# Patient Record
Sex: MALE | Race: WHITE | Employment: FULL TIME | URBAN - METROPOLITAN AREA
[De-identification: names, ages, dates, MRNs, and addresses within clinical notes are randomized per-mention and may not be internally consistent; named-entity substitution may affect disease eponyms.]

---

## 2018-11-30 ENCOUNTER — APPOINTMENT (OUTPATIENT)
Dept: CT IMAGING | Age: 48
End: 2018-11-30
Payer: COMMERCIAL

## 2018-11-30 ENCOUNTER — HOSPITAL ENCOUNTER (EMERGENCY)
Age: 48
Discharge: HOME | End: 2018-11-30
Attending: EMERGENCY MEDICINE
Payer: COMMERCIAL

## 2018-11-30 VITALS
DIASTOLIC BLOOD PRESSURE: 96 MMHG | TEMPERATURE: 98.3 F | HEIGHT: 75 IN | HEART RATE: 92 BPM | OXYGEN SATURATION: 96 % | WEIGHT: 235 LBS | SYSTOLIC BLOOD PRESSURE: 154 MMHG | BODY MASS INDEX: 29.22 KG/M2 | RESPIRATION RATE: 18 BRPM

## 2018-11-30 DIAGNOSIS — K43.9 ABDOMINAL WALL HERNIA: Primary | ICD-10-CM

## 2018-11-30 LAB
ALBUMIN SERPL-MCNC: 4.2 G/DL (ref 3.5–5.2)
ALP BLD-CCNC: 52 U/L (ref 40–129)
ALT SERPL-CCNC: 24 U/L (ref 0–40)
ANION GAP SERPL CALCULATED.3IONS-SCNC: 11 MMOL/L (ref 7–16)
AST SERPL-CCNC: 20 U/L (ref 0–39)
BASOPHILS ABSOLUTE: 0.02 E9/L (ref 0–0.2)
BASOPHILS RELATIVE PERCENT: 0.3 % (ref 0–2)
BILIRUB SERPL-MCNC: 0.6 MG/DL (ref 0–1.2)
BILIRUBIN URINE: NEGATIVE
BLOOD, URINE: NEGATIVE
BUN BLDV-MCNC: 16 MG/DL (ref 6–20)
CALCIUM SERPL-MCNC: 9.1 MG/DL (ref 8.6–10.2)
CHLORIDE BLD-SCNC: 105 MMOL/L (ref 98–107)
CLARITY: CLEAR
CO2: 23 MMOL/L (ref 22–29)
COLOR: YELLOW
CREAT SERPL-MCNC: 1.1 MG/DL (ref 0.7–1.2)
EOSINOPHILS ABSOLUTE: 0.07 E9/L (ref 0.05–0.5)
EOSINOPHILS RELATIVE PERCENT: 1 % (ref 0–6)
GFR AFRICAN AMERICAN: >60
GFR NON-AFRICAN AMERICAN: >60 ML/MIN/1.73
GLUCOSE BLD-MCNC: 119 MG/DL (ref 74–99)
GLUCOSE URINE: NEGATIVE MG/DL
HCT VFR BLD CALC: 40.2 % (ref 37–54)
HEMOGLOBIN: 13.4 G/DL (ref 12.5–16.5)
IMMATURE GRANULOCYTES #: 0.02 E9/L
IMMATURE GRANULOCYTES %: 0.3 % (ref 0–5)
KETONES, URINE: NEGATIVE MG/DL
LEUKOCYTE ESTERASE, URINE: NEGATIVE
LYMPHOCYTES ABSOLUTE: 1.3 E9/L (ref 1.5–4)
LYMPHOCYTES RELATIVE PERCENT: 18 % (ref 20–42)
MCH RBC QN AUTO: 26.4 PG (ref 26–35)
MCHC RBC AUTO-ENTMCNC: 33.3 % (ref 32–34.5)
MCV RBC AUTO: 79.1 FL (ref 80–99.9)
MONOCYTES ABSOLUTE: 0.81 E9/L (ref 0.1–0.95)
MONOCYTES RELATIVE PERCENT: 11.2 % (ref 2–12)
NEUTROPHILS ABSOLUTE: 5.01 E9/L (ref 1.8–7.3)
NEUTROPHILS RELATIVE PERCENT: 69.2 % (ref 43–80)
NITRITE, URINE: NEGATIVE
PDW BLD-RTO: 13.8 FL (ref 11.5–15)
PH UA: 5.5 (ref 5–9)
PLATELET # BLD: 135 E9/L (ref 130–450)
PMV BLD AUTO: 10.3 FL (ref 7–12)
POTASSIUM SERPL-SCNC: 4 MMOL/L (ref 3.5–5)
PROTEIN UA: NEGATIVE MG/DL
RBC # BLD: 5.08 E12/L (ref 3.8–5.8)
SODIUM BLD-SCNC: 139 MMOL/L (ref 132–146)
SPECIFIC GRAVITY UA: <=1.005 (ref 1–1.03)
TOTAL PROTEIN: 7.1 G/DL (ref 6.4–8.3)
UROBILINOGEN, URINE: 0.2 E.U./DL
WBC # BLD: 7.2 E9/L (ref 4.5–11.5)

## 2018-11-30 PROCEDURE — 2580000003 HC RX 258: Performed by: RADIOLOGY

## 2018-11-30 PROCEDURE — 74177 CT ABD & PELVIS W/CONTRAST: CPT

## 2018-11-30 PROCEDURE — 85025 COMPLETE CBC W/AUTO DIFF WBC: CPT

## 2018-11-30 PROCEDURE — 99284 EMERGENCY DEPT VISIT MOD MDM: CPT

## 2018-11-30 PROCEDURE — 96374 THER/PROPH/DIAG INJ IV PUSH: CPT

## 2018-11-30 PROCEDURE — 81003 URINALYSIS AUTO W/O SCOPE: CPT

## 2018-11-30 PROCEDURE — 36415 COLL VENOUS BLD VENIPUNCTURE: CPT

## 2018-11-30 PROCEDURE — 2580000003 HC RX 258: Performed by: EMERGENCY MEDICINE

## 2018-11-30 PROCEDURE — 80053 COMPREHEN METABOLIC PANEL: CPT

## 2018-11-30 PROCEDURE — 6360000002 HC RX W HCPCS: Performed by: EMERGENCY MEDICINE

## 2018-11-30 PROCEDURE — 6360000004 HC RX CONTRAST MEDICATION: Performed by: RADIOLOGY

## 2018-11-30 RX ORDER — SODIUM CHLORIDE 0.9 % (FLUSH) 0.9 %
10 SYRINGE (ML) INJECTION ONCE
Status: COMPLETED | OUTPATIENT
Start: 2018-11-30 | End: 2018-11-30

## 2018-11-30 RX ORDER — 0.9 % SODIUM CHLORIDE 0.9 %
1000 INTRAVENOUS SOLUTION INTRAVENOUS ONCE
Status: COMPLETED | OUTPATIENT
Start: 2018-11-30 | End: 2018-11-30

## 2018-11-30 RX ORDER — KETOROLAC TROMETHAMINE 30 MG/ML
30 INJECTION, SOLUTION INTRAMUSCULAR; INTRAVENOUS ONCE
Status: COMPLETED | OUTPATIENT
Start: 2018-11-30 | End: 2018-11-30

## 2018-11-30 RX ADMIN — KETOROLAC TROMETHAMINE 30 MG: 30 INJECTION, SOLUTION INTRAMUSCULAR at 02:59

## 2018-11-30 RX ADMIN — IOPAMIDOL 100 ML: 755 INJECTION, SOLUTION INTRAVENOUS at 03:31

## 2018-11-30 RX ADMIN — Medication 10 ML: at 03:31

## 2018-11-30 RX ADMIN — SODIUM CHLORIDE 1000 ML: 9 INJECTION, SOLUTION INTRAVENOUS at 02:58

## 2018-11-30 ASSESSMENT — PAIN DESCRIPTION - DESCRIPTORS: DESCRIPTORS: STABBING

## 2018-11-30 ASSESSMENT — PAIN SCALES - GENERAL
PAINLEVEL_OUTOF10: 8
PAINLEVEL_OUTOF10: 4
PAINLEVEL_OUTOF10: 8

## 2018-11-30 ASSESSMENT — PAIN DESCRIPTION - FREQUENCY: FREQUENCY: CONTINUOUS

## 2018-11-30 ASSESSMENT — PAIN DESCRIPTION - LOCATION: LOCATION: ABDOMEN

## 2018-11-30 ASSESSMENT — PAIN DESCRIPTION - PAIN TYPE: TYPE: ACUTE PAIN

## 2018-11-30 ASSESSMENT — PAIN DESCRIPTION - ORIENTATION: ORIENTATION: LEFT

## 2018-11-30 NOTE — ED NOTES
IV removed. Dressing applied. Discharge instructions given. Patient states verbal understanding. Ambulatory to exit.        Alejandra Rosario RN  11/30/18 3584

## 2018-11-30 NOTE — ED NOTES
IV initiated. Labs obtained. Medicated as ordered. Patient tolerated well.       Kenneth Vargas RN  11/30/18 7688

## 2018-11-30 NOTE — ED PROVIDER NOTES
Department of Emergency Medicine   ED  Provider Note  Admit Date/Time: 11/30/2018  2:30 AM  ED Bed: 05/05  Chief Complaint     Abdominal Pain (left sided abdominal pain since this evening, denies nausea or vomiting)    History of Present Illness   Source of history provided by:  patient. History/Exam Limitations: none. Woody Willams is a 46 y.o. old male who has a past medical history of:   Past Medical History:   Diagnosis Date    DVT (deep vein thrombosis) in pregnancy ([facility] [address]     Lupus anticoagulant disorder ([facility] [address]     presents to the emergency department by private vehicle, ambulatory and accompanied by his son, for complaints of gradual onset, still present, aching pain L side of his abd without radiation which began several hour(s) prior to arrival.  It started while @ work yesterday but he had to wait 'til his son came home to bring him here. There has been no similar episodes in the past he does have a lge abd that's been repaired x2, but he states this pain is different. Since onset the symptoms have been persistent. But now also on the R side. The pain is associated with nothing pertinent. The pain is aggravated by none and relieved by nothing. He did try some Pepto w/o relief. There has been NO vomiting. Jacki Norse ROS   Pertinent positives and negatives are stated within HPI, all other systems reviewed and are negative. Past Surgical History:   Procedure Laterality Date    CHOLECYSTECTOMY      HERNIA REPAIR      x 2   Social History:  reports that he has never smoked. He does not have any smokeless tobacco history on file. Family History: family history is not on file. Allergies: Patient has no known allergies.     Physical Exam           ED Triage Vitals [11/30/18 0233]   BP Temp Temp Source Pulse Resp SpO2 Height Weight   (!) 154/96 98.3 °F (36.8 °C) Oral 92 18 96 % 6' 3\" (1.905 m) 235 lb (106.6 kg)      Oxygen Saturation Interpretation: Normal.    · General Appearance/Constitutional: - 29 mmol/L    Anion Gap 11 7 - 16 mmol/L    Glucose 119 (H) 74 - 99 mg/dL    BUN 16 6 - 20 mg/dL    CREATININE 1.1 0.7 - 1.2 mg/dL    GFR Non-African American >60 >=60 mL/min/1.73    GFR African American >60     Calcium 9.1 8.6 - 10.2 mg/dL    Total Protein 7.1 6.4 - 8.3 g/dL    Alb 4.2 3.5 - 5.2 g/dL    Total Bilirubin 0.6 0.0 - 1.2 mg/dL    Alkaline Phosphatase 52 40 - 129 U/L    ALT 24 0 - 40 U/L    AST 20 0 - 39 U/L   Urinalysis   Result Value Ref Range    Color, UA Yellow Straw/Yellow    Clarity, UA Clear Clear    Glucose, Ur Negative Negative mg/dL    Bilirubin Urine Negative Negative    Ketones, Urine Negative Negative mg/dL    Specific Gravity, UA <=1.005 1.005 - 1.030    Blood, Urine Negative Negative    pH, UA 5.5 5.0 - 9.0    Protein, UA Negative Negative mg/dL    Urobilinogen, Urine 0.2 <2.0 E.U./dL    Nitrite, Urine Negative Negative    Leukocyte Esterase, Urine Negative Negative     Imaging: All Radiology results interpreted by Radiologist unless otherwise noted. CT ABDOMEN PELVIS W IV CONTRAST Additional Contrast? None   Final Result   1. There is a umbilical hernia containing loops of small bowel. There there are    nondilated fluid-filled loops of small bowel seen proximally and attenuated    small bowel extending to the hernia sac possibly causing mild obstruction. 2. Normal-appearing appendix    3. No evidence for ureteral obstruction    4. Bilateral simple appearing renal cysts, largest present on the left    measuring 2.7 cm.        This report has been electronically signed by Marie Parker MD.          ED Course / Medical Decision Making     Medications   0.9 % sodium chloride bolus (1,000 mLs Intravenous New Bag 11/30/18 0258)   ketorolac (TORADOL) injection 30 mg (30 mg Intravenous Given 11/30/18 0259)   iopamidol (ISOVUE-370) 76 % injection 100 mL (100 mLs Intravenous Given 11/30/18 0331)   sodium chloride flush 0.9 % injection 10 mL (10 mLs Intravenous Given 11/30/18 0331)

## 2021-10-09 ENCOUNTER — HOSPITAL ENCOUNTER (INPATIENT)
Age: 51
LOS: 2 days | Discharge: HOME | DRG: 640 | End: 2021-10-11
Attending: EMERGENCY MEDICINE | Admitting: INTERNAL MEDICINE
Payer: COMMERCIAL

## 2021-10-09 ENCOUNTER — APPOINTMENT (OUTPATIENT)
Dept: GENERAL RADIOLOGY | Age: 51
DRG: 640 | End: 2021-10-09
Payer: COMMERCIAL

## 2021-10-09 ENCOUNTER — APPOINTMENT (OUTPATIENT)
Dept: CT IMAGING | Age: 51
DRG: 640 | End: 2021-10-09
Payer: COMMERCIAL

## 2021-10-09 DIAGNOSIS — J96.01 ACUTE RESPIRATORY FAILURE WITH HYPOXIA (HCC): Primary | ICD-10-CM

## 2021-10-09 DIAGNOSIS — J18.9 PNEUMONIA DUE TO INFECTIOUS ORGANISM, UNSPECIFIED LATERALITY, UNSPECIFIED PART OF LUNG: ICD-10-CM

## 2021-10-09 PROBLEM — R79.89 ELEVATED TROPONIN: Status: ACTIVE | Noted: 2021-10-09

## 2021-10-09 PROBLEM — Z95.2 HX OF AORTIC VALVE REPLACEMENT: Status: ACTIVE | Noted: 2021-10-09

## 2021-10-09 PROBLEM — Z86.718 HISTORY OF DVT (DEEP VEIN THROMBOSIS): Status: ACTIVE | Noted: 2021-10-09

## 2021-10-09 PROBLEM — R77.8 ELEVATED TROPONIN: Status: ACTIVE | Noted: 2021-10-09

## 2021-10-09 PROBLEM — E87.1 HYPONATREMIA: Status: ACTIVE | Noted: 2021-10-09

## 2021-10-09 LAB
ADENOVIRUS BY PCR: NOT DETECTED
ALBUMIN SERPL-MCNC: 3.6 G/DL (ref 3.5–5.2)
ALP BLD-CCNC: 56 U/L (ref 40–129)
ALT SERPL-CCNC: 19 U/L (ref 0–40)
ANION GAP SERPL CALCULATED.3IONS-SCNC: 10 MMOL/L (ref 7–16)
ANION GAP SERPL CALCULATED.3IONS-SCNC: 13 MMOL/L (ref 7–16)
ANION GAP SERPL CALCULATED.3IONS-SCNC: 9 MMOL/L (ref 7–16)
ANISOCYTOSIS: ABNORMAL
APTT: 74.9 SEC (ref 24.5–35.1)
AST SERPL-CCNC: 28 U/L (ref 0–39)
BASOPHILS ABSOLUTE: 0 E9/L (ref 0–0.2)
BASOPHILS ABSOLUTE: 0.02 E9/L (ref 0–0.2)
BASOPHILS RELATIVE PERCENT: 0 % (ref 0–2)
BASOPHILS RELATIVE PERCENT: 0.5 % (ref 0–2)
BILIRUB SERPL-MCNC: 0.3 MG/DL (ref 0–1.2)
BORDETELLA PARAPERTUSSIS BY PCR: NOT DETECTED
BORDETELLA PERTUSSIS BY PCR: NOT DETECTED
BUN BLDV-MCNC: 13 MG/DL (ref 6–20)
BUN BLDV-MCNC: 13 MG/DL (ref 6–20)
BUN BLDV-MCNC: 15 MG/DL (ref 6–20)
BURR CELLS: ABNORMAL
C-REACTIVE PROTEIN: 10.4 MG/DL (ref 0–0.4)
C-REACTIVE PROTEIN: 11.8 MG/DL (ref 0–0.4)
CALCIUM SERPL-MCNC: 8.5 MG/DL (ref 8.6–10.2)
CALCIUM SERPL-MCNC: 8.8 MG/DL (ref 8.6–10.2)
CALCIUM SERPL-MCNC: 8.9 MG/DL (ref 8.6–10.2)
CHLAMYDOPHILIA PNEUMONIAE BY PCR: NOT DETECTED
CHLORIDE BLD-SCNC: 104 MMOL/L (ref 98–107)
CHLORIDE BLD-SCNC: 107 MMOL/L (ref 98–107)
CHLORIDE BLD-SCNC: 98 MMOL/L (ref 98–107)
CO2: 17 MMOL/L (ref 22–29)
CO2: 19 MMOL/L (ref 22–29)
CO2: 22 MMOL/L (ref 22–29)
CORONAVIRUS 229E BY PCR: NOT DETECTED
CORONAVIRUS HKU1 BY PCR: NOT DETECTED
CORONAVIRUS NL63 BY PCR: NOT DETECTED
CORONAVIRUS OC43 BY PCR: NOT DETECTED
CREAT SERPL-MCNC: 1.1 MG/DL (ref 0.7–1.2)
CREAT SERPL-MCNC: 1.2 MG/DL (ref 0.7–1.2)
CREAT SERPL-MCNC: 1.2 MG/DL (ref 0.7–1.2)
D DIMER: 621 NG/ML DDU
EKG ATRIAL RATE: 92 BPM
EKG P AXIS: 57 DEGREES
EKG P-R INTERVAL: 150 MS
EKG Q-T INTERVAL: 376 MS
EKG QRS DURATION: 100 MS
EKG QTC CALCULATION (BAZETT): 464 MS
EKG R AXIS: 24 DEGREES
EKG T AXIS: 41 DEGREES
EKG VENTRICULAR RATE: 92 BPM
EOSINOPHILS ABSOLUTE: 0.02 E9/L (ref 0.05–0.5)
EOSINOPHILS ABSOLUTE: 0.06 E9/L (ref 0.05–0.5)
EOSINOPHILS RELATIVE PERCENT: 0.6 % (ref 0–6)
EOSINOPHILS RELATIVE PERCENT: 1.6 % (ref 0–6)
FERRITIN: 110 NG/ML
FIBRINOGEN: 695 MG/DL (ref 225–540)
GFR AFRICAN AMERICAN: >60
GFR NON-AFRICAN AMERICAN: >60 ML/MIN/1.73
GLUCOSE BLD-MCNC: 123 MG/DL (ref 74–99)
GLUCOSE BLD-MCNC: 126 MG/DL (ref 74–99)
GLUCOSE BLD-MCNC: 165 MG/DL (ref 74–99)
HCT VFR BLD CALC: 34.4 % (ref 37–54)
HCT VFR BLD CALC: 35 % (ref 37–54)
HEMOGLOBIN: 11.5 G/DL (ref 12.5–16.5)
HEMOGLOBIN: 11.5 G/DL (ref 12.5–16.5)
HUMAN METAPNEUMOVIRUS BY PCR: NOT DETECTED
HUMAN RHINOVIRUS/ENTEROVIRUS BY PCR: NOT DETECTED
HYPOCHROMIA: ABNORMAL
IMMATURE GRANULOCYTES #: 0.01 E9/L
IMMATURE GRANULOCYTES #: 0.02 E9/L
IMMATURE GRANULOCYTES %: 0.3 % (ref 0–5)
IMMATURE GRANULOCYTES %: 0.5 % (ref 0–5)
INFLUENZA A BY PCR: NOT DETECTED
INFLUENZA B BY PCR: NOT DETECTED
INR BLD: 2.5
LACTATE DEHYDROGENASE: 342 U/L (ref 135–225)
LYMPHOCYTES ABSOLUTE: 0.47 E9/L (ref 1.5–4)
LYMPHOCYTES ABSOLUTE: 0.63 E9/L (ref 1.5–4)
LYMPHOCYTES RELATIVE PERCENT: 13 % (ref 20–42)
LYMPHOCYTES RELATIVE PERCENT: 16.3 % (ref 20–42)
MCH RBC QN AUTO: 23.5 PG (ref 26–35)
MCH RBC QN AUTO: 23.9 PG (ref 26–35)
MCHC RBC AUTO-ENTMCNC: 32.9 % (ref 32–34.5)
MCHC RBC AUTO-ENTMCNC: 33.4 % (ref 32–34.5)
MCV RBC AUTO: 71.4 FL (ref 80–99.9)
MCV RBC AUTO: 71.6 FL (ref 80–99.9)
MONOCYTES ABSOLUTE: 0.19 E9/L (ref 0.1–0.95)
MONOCYTES ABSOLUTE: 0.24 E9/L (ref 0.1–0.95)
MONOCYTES RELATIVE PERCENT: 5.2 % (ref 2–12)
MONOCYTES RELATIVE PERCENT: 6.2 % (ref 2–12)
MYCOPLASMA PNEUMONIAE BY PCR: NOT DETECTED
NEUTROPHILS ABSOLUTE: 2.9 E9/L (ref 1.8–7.3)
NEUTROPHILS ABSOLUTE: 2.93 E9/L (ref 1.8–7.3)
NEUTROPHILS RELATIVE PERCENT: 74.9 % (ref 43–80)
NEUTROPHILS RELATIVE PERCENT: 80.9 % (ref 43–80)
OVALOCYTES: ABNORMAL
PARAINFLUENZA VIRUS 1 BY PCR: NOT DETECTED
PARAINFLUENZA VIRUS 2 BY PCR: NOT DETECTED
PARAINFLUENZA VIRUS 3 BY PCR: NOT DETECTED
PARAINFLUENZA VIRUS 4 BY PCR: NOT DETECTED
PDW BLD-RTO: 16.3 FL (ref 11.5–15)
PDW BLD-RTO: 16.5 FL (ref 11.5–15)
PLATELET # BLD: 191 E9/L (ref 130–450)
PLATELET # BLD: 203 E9/L (ref 130–450)
PMV BLD AUTO: 10.2 FL (ref 7–12)
PMV BLD AUTO: 10.2 FL (ref 7–12)
POIKILOCYTES: ABNORMAL
POLYCHROMASIA: ABNORMAL
POTASSIUM REFLEX MAGNESIUM: 4.2 MMOL/L (ref 3.5–5)
POTASSIUM SERPL-SCNC: 3.9 MMOL/L (ref 3.5–5)
POTASSIUM SERPL-SCNC: 4.1 MMOL/L (ref 3.5–5)
PRO-BNP: 204 PG/ML (ref 0–125)
PROCALCITONIN: 0.18 NG/ML (ref 0–0.08)
PROTHROMBIN TIME: 28.4 SEC (ref 9.3–12.4)
RBC # BLD: 4.82 E12/L (ref 3.8–5.8)
RBC # BLD: 4.89 E12/L (ref 3.8–5.8)
RESPIRATORY SYNCYTIAL VIRUS BY PCR: NOT DETECTED
SARS-COV-2, NAAT: NOT DETECTED
SARS-COV-2, NAAT: NOT DETECTED
SARS-COV-2, PCR: NOT DETECTED
SCHISTOCYTES: ABNORMAL
SEDIMENTATION RATE, ERYTHROCYTE: 44 MM/HR (ref 0–15)
SODIUM BLD-SCNC: 128 MMOL/L (ref 132–146)
SODIUM BLD-SCNC: 135 MMOL/L (ref 132–146)
SODIUM BLD-SCNC: 136 MMOL/L (ref 132–146)
TOTAL PROTEIN: 6.9 G/DL (ref 6.4–8.3)
TROPONIN, HIGH SENSITIVITY: 12 NG/L (ref 0–11)
TROPONIN, HIGH SENSITIVITY: 24 NG/L (ref 0–11)
TROPONIN, HIGH SENSITIVITY: 35 NG/L (ref 0–11)
WBC # BLD: 3.6 E9/L (ref 4.5–11.5)
WBC # BLD: 3.9 E9/L (ref 4.5–11.5)

## 2021-10-09 PROCEDURE — 83615 LACTATE (LD) (LDH) ENZYME: CPT

## 2021-10-09 PROCEDURE — 2060000000 HC ICU INTERMEDIATE R&B

## 2021-10-09 PROCEDURE — 86140 C-REACTIVE PROTEIN: CPT

## 2021-10-09 PROCEDURE — 84484 ASSAY OF TROPONIN QUANT: CPT

## 2021-10-09 PROCEDURE — 93005 ELECTROCARDIOGRAM TRACING: CPT | Performed by: EMERGENCY MEDICINE

## 2021-10-09 PROCEDURE — 85384 FIBRINOGEN ACTIVITY: CPT

## 2021-10-09 PROCEDURE — 36415 COLL VENOUS BLD VENIPUNCTURE: CPT

## 2021-10-09 PROCEDURE — 2580000003 HC RX 258: Performed by: EMERGENCY MEDICINE

## 2021-10-09 PROCEDURE — 85378 FIBRIN DEGRADE SEMIQUANT: CPT

## 2021-10-09 PROCEDURE — 82728 ASSAY OF FERRITIN: CPT

## 2021-10-09 PROCEDURE — 2500000003 HC RX 250 WO HCPCS: Performed by: NURSE PRACTITIONER

## 2021-10-09 PROCEDURE — 85610 PROTHROMBIN TIME: CPT

## 2021-10-09 PROCEDURE — 85651 RBC SED RATE NONAUTOMATED: CPT

## 2021-10-09 PROCEDURE — 70450 CT HEAD/BRAIN W/O DYE: CPT

## 2021-10-09 PROCEDURE — 6360000002 HC RX W HCPCS: Performed by: EMERGENCY MEDICINE

## 2021-10-09 PROCEDURE — 71275 CT ANGIOGRAPHY CHEST: CPT

## 2021-10-09 PROCEDURE — 71045 X-RAY EXAM CHEST 1 VIEW: CPT

## 2021-10-09 PROCEDURE — 6370000000 HC RX 637 (ALT 250 FOR IP): Performed by: NURSE PRACTITIONER

## 2021-10-09 PROCEDURE — 83880 ASSAY OF NATRIURETIC PEPTIDE: CPT

## 2021-10-09 PROCEDURE — 2500000003 HC RX 250 WO HCPCS: Performed by: EMERGENCY MEDICINE

## 2021-10-09 PROCEDURE — 93010 ELECTROCARDIOGRAM REPORT: CPT | Performed by: INTERNAL MEDICINE

## 2021-10-09 PROCEDURE — 0202U NFCT DS 22 TRGT SARS-COV-2: CPT

## 2021-10-09 PROCEDURE — 85025 COMPLETE CBC W/AUTO DIFF WBC: CPT

## 2021-10-09 PROCEDURE — 87449 NOS EACH ORGANISM AG IA: CPT

## 2021-10-09 PROCEDURE — 6360000004 HC RX CONTRAST MEDICATION: Performed by: RADIOLOGY

## 2021-10-09 PROCEDURE — 87040 BLOOD CULTURE FOR BACTERIA: CPT

## 2021-10-09 PROCEDURE — 99284 EMERGENCY DEPT VISIT MOD MDM: CPT

## 2021-10-09 PROCEDURE — 85730 THROMBOPLASTIN TIME PARTIAL: CPT

## 2021-10-09 PROCEDURE — 87635 SARS-COV-2 COVID-19 AMP PRB: CPT

## 2021-10-09 PROCEDURE — 84145 PROCALCITONIN (PCT): CPT

## 2021-10-09 PROCEDURE — 2700000000 HC OXYGEN THERAPY PER DAY

## 2021-10-09 PROCEDURE — 80048 BASIC METABOLIC PNL TOTAL CA: CPT

## 2021-10-09 PROCEDURE — 80053 COMPREHEN METABOLIC PANEL: CPT

## 2021-10-09 PROCEDURE — 2580000003 HC RX 258: Performed by: NURSE PRACTITIONER

## 2021-10-09 RX ORDER — ALBUTEROL SULFATE 90 UG/1
2 AEROSOL, METERED RESPIRATORY (INHALATION) EVERY 6 HOURS
Status: DISCONTINUED | OUTPATIENT
Start: 2021-10-09 | End: 2021-10-09

## 2021-10-09 RX ORDER — DILTIAZEM HYDROCHLORIDE 120 MG/1
120 CAPSULE, COATED, EXTENDED RELEASE ORAL DAILY
COMMUNITY

## 2021-10-09 RX ORDER — IPRATROPIUM BROMIDE AND ALBUTEROL SULFATE 2.5; .5 MG/3ML; MG/3ML
1 SOLUTION RESPIRATORY (INHALATION)
Status: DISCONTINUED | OUTPATIENT
Start: 2021-10-09 | End: 2021-10-09

## 2021-10-09 RX ORDER — FLUTICASONE PROPIONATE 50 MCG
2 SPRAY, SUSPENSION (ML) NASAL DAILY
Status: DISCONTINUED | OUTPATIENT
Start: 2021-10-09 | End: 2021-10-11 | Stop reason: HOSPADM

## 2021-10-09 RX ORDER — SODIUM CHLORIDE 9 MG/ML
INJECTION, SOLUTION INTRAVENOUS CONTINUOUS
Status: DISCONTINUED | OUTPATIENT
Start: 2021-10-09 | End: 2021-10-09

## 2021-10-09 RX ORDER — METOPROLOL SUCCINATE 50 MG/1
50 TABLET, EXTENDED RELEASE ORAL DAILY
Status: DISCONTINUED | OUTPATIENT
Start: 2021-10-09 | End: 2021-10-11 | Stop reason: HOSPADM

## 2021-10-09 RX ORDER — DILTIAZEM HYDROCHLORIDE 120 MG/1
120 CAPSULE, COATED, EXTENDED RELEASE ORAL DAILY
Status: DISCONTINUED | OUTPATIENT
Start: 2021-10-09 | End: 2021-10-11 | Stop reason: HOSPADM

## 2021-10-09 RX ORDER — DEXAMETHASONE SODIUM PHOSPHATE 4 MG/ML
6 INJECTION, SOLUTION INTRA-ARTICULAR; INTRALESIONAL; INTRAMUSCULAR; INTRAVENOUS; SOFT TISSUE DAILY
Status: DISCONTINUED | OUTPATIENT
Start: 2021-10-10 | End: 2021-10-09

## 2021-10-09 RX ORDER — DEXAMETHASONE SODIUM PHOSPHATE 10 MG/ML
6 INJECTION INTRAMUSCULAR; INTRAVENOUS ONCE
Status: COMPLETED | OUTPATIENT
Start: 2021-10-09 | End: 2021-10-09

## 2021-10-09 RX ORDER — WARFARIN SODIUM 5 MG/1
7.5 TABLET ORAL
Status: COMPLETED | OUTPATIENT
Start: 2021-10-09 | End: 2021-10-09

## 2021-10-09 RX ORDER — POLYETHYLENE GLYCOL 3350 17 G/17G
17 POWDER, FOR SOLUTION ORAL DAILY PRN
Status: DISCONTINUED | OUTPATIENT
Start: 2021-10-09 | End: 2021-10-11 | Stop reason: HOSPADM

## 2021-10-09 RX ORDER — ACETAMINOPHEN 650 MG/1
650 SUPPOSITORY RECTAL EVERY 6 HOURS PRN
Status: DISCONTINUED | OUTPATIENT
Start: 2021-10-09 | End: 2021-10-11 | Stop reason: HOSPADM

## 2021-10-09 RX ORDER — PROCHLORPERAZINE EDISYLATE 5 MG/ML
5 INJECTION INTRAMUSCULAR; INTRAVENOUS EVERY 6 HOURS PRN
Status: DISCONTINUED | OUTPATIENT
Start: 2021-10-09 | End: 2021-10-11 | Stop reason: HOSPADM

## 2021-10-09 RX ORDER — IPRATROPIUM BROMIDE AND ALBUTEROL SULFATE 2.5; .5 MG/3ML; MG/3ML
1 SOLUTION RESPIRATORY (INHALATION)
Status: CANCELLED | OUTPATIENT
Start: 2021-10-09

## 2021-10-09 RX ORDER — METOPROLOL SUCCINATE 25 MG/1
50 TABLET, EXTENDED RELEASE ORAL DAILY
COMMUNITY
End: 2022-02-07

## 2021-10-09 RX ORDER — SODIUM CHLORIDE 0.9 % (FLUSH) 0.9 %
5-40 SYRINGE (ML) INJECTION PRN
Status: DISCONTINUED | OUTPATIENT
Start: 2021-10-09 | End: 2021-10-11 | Stop reason: HOSPADM

## 2021-10-09 RX ORDER — BENZONATATE 100 MG/1
100 CAPSULE ORAL EVERY 8 HOURS PRN
Status: DISCONTINUED | OUTPATIENT
Start: 2021-10-09 | End: 2021-10-11 | Stop reason: HOSPADM

## 2021-10-09 RX ORDER — ACETAMINOPHEN 325 MG/1
650 TABLET ORAL EVERY 6 HOURS PRN
Status: DISCONTINUED | OUTPATIENT
Start: 2021-10-09 | End: 2021-10-11 | Stop reason: HOSPADM

## 2021-10-09 RX ORDER — SODIUM CHLORIDE 0.9 % (FLUSH) 0.9 %
5-40 SYRINGE (ML) INJECTION EVERY 12 HOURS SCHEDULED
Status: DISCONTINUED | OUTPATIENT
Start: 2021-10-09 | End: 2021-10-11 | Stop reason: HOSPADM

## 2021-10-09 RX ORDER — ASCORBIC ACID 500 MG
500 TABLET ORAL DAILY
Status: DISCONTINUED | OUTPATIENT
Start: 2021-10-09 | End: 2021-10-11 | Stop reason: HOSPADM

## 2021-10-09 RX ORDER — SODIUM CHLORIDE 9 MG/ML
INJECTION, SOLUTION INTRAVENOUS CONTINUOUS
Status: DISCONTINUED | OUTPATIENT
Start: 2021-10-09 | End: 2021-10-09 | Stop reason: SDUPTHER

## 2021-10-09 RX ORDER — SODIUM CHLORIDE 9 MG/ML
25 INJECTION, SOLUTION INTRAVENOUS PRN
Status: DISCONTINUED | OUTPATIENT
Start: 2021-10-09 | End: 2021-10-11 | Stop reason: HOSPADM

## 2021-10-09 RX ORDER — ZINC SULFATE 50(220)MG
50 CAPSULE ORAL DAILY
Status: DISCONTINUED | OUTPATIENT
Start: 2021-10-09 | End: 2021-10-11 | Stop reason: HOSPADM

## 2021-10-09 RX ORDER — CETIRIZINE HYDROCHLORIDE 10 MG/1
10 TABLET ORAL DAILY
Status: DISCONTINUED | OUTPATIENT
Start: 2021-10-09 | End: 2021-10-11 | Stop reason: HOSPADM

## 2021-10-09 RX ADMIN — Medication 50 MG: at 09:04

## 2021-10-09 RX ADMIN — FLUTICASONE PROPIONATE 2 SPRAY: 50 SPRAY, METERED NASAL at 09:04

## 2021-10-09 RX ADMIN — IPRATROPIUM BROMIDE AND ALBUTEROL 2 PUFF: 20; 100 SPRAY, METERED RESPIRATORY (INHALATION) at 18:29

## 2021-10-09 RX ADMIN — DEXAMETHASONE SODIUM PHOSPHATE 6 MG: 10 INJECTION INTRAMUSCULAR; INTRAVENOUS at 05:21

## 2021-10-09 RX ADMIN — METOPROLOL SUCCINATE 50 MG: 50 TABLET, EXTENDED RELEASE ORAL at 16:35

## 2021-10-09 RX ADMIN — SODIUM CHLORIDE: 9 INJECTION, SOLUTION INTRAVENOUS at 01:53

## 2021-10-09 RX ADMIN — IOPAMIDOL 75 ML: 755 INJECTION, SOLUTION INTRAVENOUS at 03:14

## 2021-10-09 RX ADMIN — WARFARIN SODIUM 7.5 MG: 5 TABLET ORAL at 18:29

## 2021-10-09 RX ADMIN — WATER 1000 MG: 1 INJECTION INTRAMUSCULAR; INTRAVENOUS; SUBCUTANEOUS at 05:21

## 2021-10-09 RX ADMIN — DOXYCYCLINE 100 MG: 100 INJECTION, POWDER, LYOPHILIZED, FOR SOLUTION INTRAVENOUS at 18:29

## 2021-10-09 RX ADMIN — IPRATROPIUM BROMIDE AND ALBUTEROL 2 PUFF: 20; 100 SPRAY, METERED RESPIRATORY (INHALATION) at 22:30

## 2021-10-09 RX ADMIN — Medication 10 ML: at 21:42

## 2021-10-09 RX ADMIN — OXYCODONE HYDROCHLORIDE AND ACETAMINOPHEN 500 MG: 500 TABLET ORAL at 09:05

## 2021-10-09 RX ADMIN — Medication 10 ML: at 09:19

## 2021-10-09 RX ADMIN — DOXYCYCLINE 100 MG: 100 INJECTION, POWDER, LYOPHILIZED, FOR SOLUTION INTRAVENOUS at 05:21

## 2021-10-09 RX ADMIN — SODIUM CHLORIDE: 9 INJECTION, SOLUTION INTRAVENOUS at 06:56

## 2021-10-09 RX ADMIN — ALBUTEROL SULFATE 2 PUFF: 108 AEROSOL, METERED RESPIRATORY (INHALATION) at 09:04

## 2021-10-09 ASSESSMENT — PAIN SCALES - GENERAL: PAINLEVEL_OUTOF10: 0

## 2021-10-09 NOTE — PROGRESS NOTES
Call was placed to [facility] pharmacy in [place], [place], I spoke with [facility] who confirmed pt is perscribed  Metoprolol succinate 50mg extended release daily.

## 2021-10-09 NOTE — ED NOTES
Bed: 05  Expected date:   Expected time:   Means of arrival:   Comments:  Genevieve Whalen RN  10/09/21 0047

## 2021-10-09 NOTE — ED PROVIDER NOTES
Department of Emergency Medicine   ED  Provider Note  Admit Date/RoomTime: 10/9/2021 12:51 AM  ED Room: 05/05          History of Present Illness:  10/9/21, Time: 2:42 AM EDT  Chief Complaint   Patient presents with    Altered Mental Status     Wife called EMS for AMS. patient woke up confused. LKW 11pm. Headache aggitation spo2 89%RA arrived on 4L     Nausea                Lanie Damon is a 46 y.o. male presenting to the ED for altered mental status. Patient presents from home. Last well-known was 11 PM.  Wife found him very confused. Came on suddenly, nothing makes it better or worse, did have a vague headache with it. Left-sided, sharp in nature, patient states headache has resolved. EMS also reports he was about 88% on room air. Does not wear oxygen chronically. They state mentation improved in route. Patient states last thing remembers is being at a wedding earlier tonight. He said his neighbor got . He said the room was getting home, and then he was being taken to the hospital by EMS. Patient does have a history of DVT and PE, he is on Coumadin, he is compliant with it. He was vaccinated from COVID-19. Although, he states his been coughing for the past several days. He also has felt fatigued for the past several days. He denies any fever, chills, neck pain or stiffness, lethargy, change in bowel bladder, paresthesias, or any other symptoms or complaints. Review of Systems:   Pertinent positives and negatives are stated within HPI, all other systems reviewed and are negative.        --------------------------------------------- PAST HISTORY ---------------------------------------------  Past Medical History:  has a past medical history of DVT (deep vein thrombosis) in pregnancy and Lupus anticoagulant disorder ([facility] [address] Past Surgical History:  has a past surgical history that includes hernia repair; Cholecystectomy; and Aortic valve replacement (01/2019).     Social History: reports that he has never smoked. He has never used smokeless tobacco.    Family History: family history is not on file. . Unless otherwise noted, family history is non contributory    The patients home medications have been reviewed. Allergies: Patient has no known allergies. ---------------------------------------------------PHYSICAL EXAM--------------------------------------    Constitutional/General: Alert and oriented x3  Head: Normocephalic and atraumatic  Eyes: PERRL, EOMI, sclera non icteric  Mouth: Oropharynx clear, handling secretions, no trismus, no asymmetry of the posterior oropharynx or uvular edema  Neck: Supple, full ROM, no stridor, no meningeal signs  Respiratory: Lungs clear to auscultation bilaterally, no wheezes, rales, or rhonchi. Not in respiratory distress  Cardiovascular:  Regular rate. Regular rhythm. 2+ distal pulses. Equal extremity pulses. Chest: No chest wall tenderness  GI:  Abdomen Soft, Non tender, Non distended. No rebound, guarding, or rigidity. No pulsatile masses. Musculoskeletal: Moves all extremities x 4. Warm and well perfused, no clubbing, cyanosis, or edema. Capillary refill <3 seconds  Integument: skin warm and dry. No rashes. Neurologic: GCS 15, no focal deficits, symmetric strength 5/5 in the upper and lower extremities bilaterally. NIH is 0  Psychiatric: Normal Affect          -------------------------------------------------- RESULTS -------------------------------------------------  I have personally reviewed all laboratory and imaging results for this patient. Results are listed below.      LABS: (Lab results interpreted by me)  Results for orders placed or performed during the hospital encounter of 10/09/21   COVID-19, Rapid    Specimen: Nasopharyngeal Swab   Result Value Ref Range    SARS-CoV-2, NAAT Not Detected Not Detected   Respiratory Panel, Molecular, with COVID-19 (Restricted: peds pts or suitable admitted adults)    Specimen: Nasopharyngeal Result Value Ref Range    Adenovirus by PCR Not Detected Not Detected    Bordetella parapertussis by PCR Not Detected Not Detected    Bordetella pertussis by PCR Not Detected Not Detected    Chlamydophilia pneumoniae by PCR Not Detected Not Detected    Coronavirus 229E by PCR Not Detected Not Detected    Coronavirus HKU1 by PCR Not Detected Not Detected    Coronavirus NL63 by PCR Not Detected Not Detected    Coronavirus OC43 by PCR Not Detected Not Detected    SARS-CoV-2, PCR Not Detected Not Detected    Human Metapneumovirus by PCR Not Detected Not Detected    Human Rhinovirus/Enterovirus by PCR Not Detected Not Detected    Influenza A by PCR Not Detected Not Detected    Influenza B by PCR Not Detected Not Detected    Mycoplasma pneumoniae by PCR Not Detected Not Detected    Parainfluenza Virus 1 by PCR Not Detected Not Detected    Parainfluenza Virus 2 by PCR Not Detected Not Detected    Parainfluenza Virus 3 by PCR Not Detected Not Detected    Parainfluenza Virus 4 by PCR Not Detected Not Detected    Respiratory Syncytial Virus by PCR Not Detected Not Detected   CBC Auto Differential   Result Value Ref Range    WBC 3.9 (L) 4.5 - 11.5 E9/L    RBC 4.82 3.80 - 5.80 E12/L    Hemoglobin 11.5 (L) 12.5 - 16.5 g/dL    Hematocrit 34.4 (L) 37.0 - 54.0 %    MCV 71.4 (L) 80.0 - 99.9 fL    MCH 23.9 (L) 26.0 - 35.0 pg    MCHC 33.4 32.0 - 34.5 %    RDW 16.3 (H) 11.5 - 15.0 fL    Platelets 132 665 - 457 E9/L    MPV 10.2 7.0 - 12.0 fL    Neutrophils % 74.9 43.0 - 80.0 %    Immature Granulocytes % 0.5 0.0 - 5.0 %    Lymphocytes % 16.3 (L) 20.0 - 42.0 %    Monocytes % 6.2 2.0 - 12.0 %    Eosinophils % 1.6 0.0 - 6.0 %    Basophils % 0.5 0.0 - 2.0 %    Neutrophils Absolute 2.90 1.80 - 7.30 E9/L    Immature Granulocytes # 0.02 E9/L    Lymphocytes Absolute 0.63 (L) 1.50 - 4.00 E9/L    Monocytes Absolute 0.24 0.10 - 0.95 E9/L    Eosinophils Absolute 0.06 0.05 - 0.50 E9/L    Basophils Absolute 0.02 0.00 - 0.20 E9/L   Comprehensive Metabolic Panel   Result Value Ref Range    Sodium 128 (L) 132 - 146 mmol/L    Potassium 4.1 3.5 - 5.0 mmol/L    Chloride 98 98 - 107 mmol/L    CO2 17 (L) 22 - 29 mmol/L    Anion Gap 13 7 - 16 mmol/L    Glucose 123 (H) 74 - 99 mg/dL    BUN 15 6 - 20 mg/dL    CREATININE 1.2 0.7 - 1.2 mg/dL    GFR Non-African American >60 >=60 mL/min/1.73    GFR African American >60     Calcium 8.8 8.6 - 10.2 mg/dL    Total Protein 6.9 6.4 - 8.3 g/dL    Albumin 3.6 3.5 - 5.2 g/dL    Total Bilirubin 0.3 0.0 - 1.2 mg/dL    Alkaline Phosphatase 56 40 - 129 U/L    ALT 19 0 - 40 U/L    AST 28 0 - 39 U/L   Troponin   Result Value Ref Range    Troponin, High Sensitivity 12 (H) 0 - 11 ng/L   Brain Natriuretic Peptide   Result Value Ref Range    Pro- (H) 0 - 125 pg/mL   Protime-INR   Result Value Ref Range    Protime 28.4 (H) 9.3 - 12.4 sec    INR 2.5    APTT   Result Value Ref Range    aPTT 74.9 (H) 24.5 - 35.1 sec   ,       RADIOLOGY:  Interpreted by Radiologist unless otherwise specified  CT HEAD WO CONTRAST   Final Result   No acute intracranial abnormality. CTA PULMONARY W CONTRAST   Final Result   No evidence of pulmonary embolism. Findings suggestive of extensive COVID-19 pneumonia bilaterally. A cystic area in the pancreatic body. Differential diagnosis include   pancreatic pseudocyst, branch duct dilatation, or cystic IPMN. MRI/MRCP is   recommended for further evaluation. XR CHEST PORTABLE   Final Result   Bilateral infiltrates left greater than right.                EKG Interpretation  Interpreted by emergency department physician, Dr. Renee Coughlin     Sinus rhythm, rate 92, no ischemic change        ------------------------- NURSING NOTES AND VITALS REVIEWED ---------------------------   The nursing notes within the ED encounter and vital signs as below have been reviewed by myself  /78   Pulse 91   Temp 98.5 °F (36.9 °C)   Resp 22   Ht 6' 3\" (1.905 m)   Wt 245 lb (111.1 kg)   SpO2 92%   BMI 30.62 kg/m²     Oxygen Saturation Interpretation: Improved after treatment    The patients available past medical records and past encounters were reviewed. ------------------------------ ED COURSE/MEDICAL DECISION MAKING----------------------  Medications   0.9 % sodium chloride infusion ( IntraVENous New Bag 10/9/21 0153)   cefTRIAXone (ROCEPHIN) 1,000 mg in sterile water 10 mL IV syringe (has no administration in time range)   doxycycline (VIBRAMYCIN) 100 mg in dextrose 5 % 100 mL IVPB (has no administration in time range)   dexamethasone (DECADRON) injection 6 mg (has no administration in time range)   iopamidol (ISOVUE-370) 76 % injection 75 mL (75 mLs IntraVENous Given 10/9/21 6501)           The cardiac monitor revealed sinus with a heart rate in the 90s as interpreted by me. The cardiac monitor was ordered secondary to the patient's hypoxia and to monitor the patient for dysrhythmia. CPT [id number]         Medical Decision Making:    Patient was 84% on room air on arrival.  Was placed on 6 L of nasal cannula and responded. Labs and imaging reviewed. Reevaluation, patient's resting comfortably. Awake and alert, hemodynamically stable, tolerating nasal cannula. Patient's CAT scan is consistent with COVID-19, he is also leukopenic. Respiratory molecular panel was sent, however, this once again is negative for COVID-19. Blood cultures were obtained, patient did receive Rocephin and doxycycline. He will also receive Decadron. Infectious disease will be consulted as clinical findings seem to be consistent with COVID-19 despite the fact that he is Covid negative. He states he has no known previous exposure to Covid, he began coughing about 4 days ago. Discussed with the hospitalist, patient will be admitted. Critical Care Time: 33 minutes     Counseling:    The emergency provider has spoken with the patient and discussed todays results, in addition to providing specific details for the plan of care and counseling regarding the diagnosis and prognosis. Questions are answered at this time and they are agreeable with the plan.       --------------------------------- IMPRESSION AND DISPOSITION ---------------------------------    IMPRESSION  1. Acute respiratory failure with hypoxia ([address]    2. Pneumonia due to infectious organism, unspecified laterality, unspecified part of lung        DISPOSITION  Disposition: Admit to telemetry  Patient condition is stable        NOTE: This report was transcribed using voice recognition software.  Every effort was made to ensure accuracy; however, inadvertent computerized transcription errors may be present        Carlton Iraheta MD  10/09/21 0016

## 2021-10-09 NOTE — CONSULTS
[facility] CONSULT NOTE    Reason for Consult: Pneumonia  Requested by: Yue Juárez MD     Chief complaint: Confusion    History Obtained From: Patient and EMR    HISTORY Vahe              The patient is a 46 y.o. 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 On admission, he was afebrile, hypoxic on room air 87% oxygen saturation improving to 94% on 6 l/min oxygen by nasal cannula and hemodynamically stable with leukocytopenia of 3600. Respiratory pathogen PCR panel and SARS-CoV-2 PCR (tested twice) were negative. Chest CTA showed extensive groundglass densities throughout bilateral lungs and no pulmonary embolism. Ceftriaxone and doxycycline, as well as dexamethasone, were started on admission. ID service was subsequently consulted for further recommendations.     Past Medical History  Past Medical History:   Diagnosis Date    DVT (deep vein thrombosis) in pregnancy     Lupus anticoagulant disorder (HCC)        Current Facility-Administered Medications   Medication Dose Route Frequency Provider Last Rate Last Admin    sodium chloride flush 0.9 % injection 5-40 mL  5-40 mL IntraVENous 2 times per day April TIFFANIE DueñasN - CNP   10 mL at 10/09/21 0919    sodium chloride flush 0.9 % injection 5-40 mL  5-40 mL IntraVENous PRN April Spenser APRN - CNP        0.9 % sodium chloride infusion  25 mL IntraVENous PRN April Spenser APRN - CNP        polyethylene glycol (GLYCOLAX) packet 17 g  17 g Oral Daily PRN April TIFFANIE DueñasN - CNP        acetaminophen (TYLENOL) tablet 650 mg  650 mg Oral Q6H PRN April TIFFANIE DueñasN - CNP        Or    acetaminophen (TYLENOL) suppository 650 mg  650 mg Rectal Q6H PRN April Spenser APRN - CNP        prochlorperazine (COMPAZINE) injection 5 mg  5 mg IntraVENous Q6H continue to follow. Please do not hesitate to call for any questions or concerns. Patient was seen and examined on 10/09.     Electronically signed by Razia Perez MD on 10/9/2021 at 12:16 PM

## 2021-10-09 NOTE — H&P
[address] [facility]  History and Physical      CHIEF COMPLAINT:    Chief Complaint   Patient presents with    Altered Mental Status     Wife called EMS for AMS. patient woke up confused. LKW 11pm. Headache aggitation spo2 89%RA arrived on 4L     Nausea        Patient of Rosalino Perry DO presents with:  Acute respiratory failure with hypoxia ([facility] [address]    History of Present Illness:   Patient is a 40-year-old male with past medical history of DVT and lupus who presents to ED with altered mental status. Patient's wife explains that yesterday during the day they had an event and patient was in his usual state of health. Wife states she noticed patient was asleep on a couch and was snoring. She attempted to wake patient up and noted him to have altered mental status. Wife states patient did not know where he was. Patient explains that earlier in the week he began experiencing body aches, cough, and shortness of breath. He denies fever. Saturating 89% on RA. Patient tested negative for Covid 19 in ED however CTA pulmonary suggestive of extensive COVID-19 pneumonia bilaterally. ED work up further significant for positive delta troponin 12 >>>35>>>24. Also, hyponatremic upon presentation with Na level of 128. Patient admitted for further management. Covid was tested on a couple of occasions since admission and has been negative both times. he is also fully vaccinated    Upon examination, patient is resting in bed. Notably ill-appearing. Wife at bedside. Patient frequently coughing during examination. Patient denies fever, chills, nausea, vomiting, and loss of appetite. Patient was vaccinated for COVID-19 x2. REVIEW OF SYSTEMS:  Pertinent negatives are above in HPI. 10 point ROS otherwise negative.       Past Medical History:   Diagnosis Date    DVT (deep vein thrombosis) in pregnancy     Lupus anticoagulant disorder [facility])          Past Surgical History:   Procedure Laterality Date    AORTIC VALVE REPLACEMENT  01/2019    CHOLECYSTECTOMY      HERNIA REPAIR      x 2       Medications Prior to Admission:    Medications Prior to Admission: dilTIAZem (CARDIZEM CD) 120 MG extended release capsule, Take 120 mg by mouth daily  metoprolol succinate (TOPROL XL) 25 MG extended release tablet, Take 50 mg by mouth daily   fluticasone (FLONASE) 50 MCG/ACT nasal spray, 2 sprays in each nostril bid  loratadine (CLARITIN) 10 MG capsule, Take 1 capsule by mouth daily  warfarin (COUMADIN) 7.5 MG tablet, Take 7.5 mg by mouth four times a week  warfarin (COUMADIN) 10 MG tablet, Take 10 mg by mouth three times a week    Note that the patient's home medications were reviewed and the above list is accurate to the best of my knowledge at the time of the exam.    Allergies:    Patient has no known allergies. Social History:    reports that he has never smoked. He has never used smokeless tobacco. He reports previous alcohol use. He reports that he does not use drugs. Family History:   family history is not on file. PHYSICAL EXAM:    Vitals:  /79   Pulse 74   Temp 97.9 °F (36.6 °C) (Oral)   Resp 20   Ht 6' 3\" (1.905 m)   Wt 246 lb 14.6 oz (112 kg)   SpO2 94%   BMI 30.86 kg/m²       General appearance: Conversant, ill-appearing  Eyes: Sclerae anicteric, PERRLA  HEENT: AT/NC, MMM  Neck: FROM, supple, no thyromegaly  Lymph: No cervical / supraclavicular lymphadenopathy  Lungs: Diminished bilaterally  CV: RRR, no MRGs, no lower extremity edema  Abdomen: Soft, non-tender; no masses or HSM, +BS  Extremities: FROM without synovitis. No clubbing or cyanosis of the hands. Skin: no rash, induration, lesions, or ulcers  Psych: Calm and cooperative. Normal judgement and insight. Normal mood and affect. Neuro: Alert and interactive, face symmetric, speech fluent. LABS:  All labs reviewed.   Of note:  CBC with Differential:    Lab Results   Component Value Date    WBC 3.6 10/09/2021    RBC 4.89 negative  -Blood cultures pending x2  -6L O2 >> titrate to keep oxygen sat> 93%  -Daily labs  -Monitor vital signs     Elevated troponin  -Patient does have an extensive cardiac history and follows in [facility]-history of valve replacement  -Delta troponin positive, 12 >>>35>>> 24  -Repeat troponin x1  -Cardiac enzymes essentially unremarkable without chest pain  Check echocardiogram to assess for volume overload/cardiomyopathy-patient follows in [facility] for his cardiac issues and has a known history of aortic valve replacement    Hyponatremia, resolved-may be from volume overload  -128>> 135  -IV hydration  -Daily labs, continue to monitor  -Repeat BMP at 1800 this evening for close monitoring    Hx DVT due to antiphospholipid syndrome  -On coumadin  -Therapeutic INR 2.5  -Home dosing resumed: Coumadin 7.5mg 4x weekly, Coumadin 10mg 3x weekly  -Pharmacy to dose     Medication for other comorbidities continue as appropriate dose adjustment as necessary. DVT prophylaxis  PT OT  Discharge planning  Case discussed with attending and agreed upon plan of care. Code status: Full  Requires inpatient level of care  TERRIE Soler CNP    12:33 PM  10/9/2021     Above note edited to reflect my thoughts    I personally saw, examined and provided care for the patient. Radiographs, labs and medication list were reviewed by me independently. The case was discussed in detail and plans for care were established. Review of TERRIE Soler CNP, documentation was conducted and revisions were made as appropriate directly by me. I agree with the above documented exam, problem list, and plan of care.      Marely Beaulieu MD  2:11 PM  10/9/2021

## 2021-10-09 NOTE — PROGRESS NOTES
Pharmacy Consultation Note  (Warfarin Dosing and Monitoring)    Initial consult date: 10/9/21  Consulting physician: TERRIE Dao CNP    Allergies:  Patient has no known allergies. 46 y.o. male    Ht Readings from Last 1 Encounters:   10/09/21 6' 3\" (1.905 m)     Wt Readings from Last 1 Encounters:   10/09/21 246 lb 14.6 oz (112 kg)         Warfarin Indication Target   INR Range Home Dose  (if applicable) Diet/Feeding Tube   (Enteral feeds, nutritional drinks, increased Vitamin K in diet can decrease INR)   hx of DVT/PE and lupus anticoagulation disorder  2.5-3.5 alternating warfarin 10 mg and 7.5 mg each day (10 mg four times per week and 7.5 mg three times per week) Adult diet; regular; low fat/low chol/high fiber/2 gm Na       x Home Med? Meds Increasing INR x Home Med?  Meds Decreasing INR     Allopurinol    Azathioprine     Amiodarone/Propafenone/Dronedarone   Carbamazepine     Androgens   Cholestyramine     Chemotherapy (BBW: Capecitabine)   Estrogen     Ciprofloxacin/Levofloxacin   Nafcillin/Dicloxacillin     Clarithromycin/Erythromycin/Azithromycin   Barbiturates      Fluconazole/Itraconazole/Voriconazole/Ketoconazole   Phenytoin (Variable)     Metronidazole   Rifampin     Phenytoin (Variable)   Steroids (Variable/Dose Dependent)      Statins/Fenofibrate/Gemfibrozil   Sucralfate     Steroids (Variable/Dose Dependent)   Other:     Sulfamethoxazole/Trimethoprim        Tramadol       x N Other: Doxycycline        Comments regarding medication interactions:      x Diseases Affecting INR x Increased Bleeding Risk    CHF Exacerbation (Increases)  History GI Bleed/PUD    Liver Disease (Increases)  Chronic NSAID Use    Thyroid: Hyper (Increases)  Hypo (Decreases)  Chronic ASA/Antiplatelet Use (Clopidogrel/ Dipyridamole/Prasugrel/Ticagrelor)      Malignancy (Increases)  Abnormal Renal Function (dialysis, renal transplant, SCr ? 2.3 mg/dL)     History of EtOH Abuse: Acute (Increases)   Chronic (Decreases)  Liver Function (cirrhosis, bilirubin >2x ULN with AST/ALT/AP >3x ULN)    Fever (Increases)  Age > 65 years    Acute infection (Increases)  Hypertension/Uncontrolled BP    Diarrhea/Dehydration (Increases)  History of stroke    Other: __________________  Other:___________________       Vitamin K or Blood product  Administration Date                    TSH:  No results found for: TSH     Hepatic Function Panel:                            Lab Results   Component Value Date    ALKPHOS 56 10/09/2021    ALT 19 10/09/2021    AST 28 10/09/2021    PROT 6.9 10/09/2021    BILITOT 0.3 10/09/2021    LABALBU 3.6 10/09/2021       Date Warfarin Dose INR Heparin or LMWH HGB/HCT PLT Comment   10/9 7.5 mg 2.5 -- 11.5/34.4 203                                          Assessment:  · Patient is a 45 yo male   · Patient is on warfarin for hx of DVT/PE and lupus anticoagulation disorder.    · Goal INR 2.5-3.5  · 10/9: INR 2.5 today; patient is on doxycycline (stop date 10/14)    Plan:  · Warfarin 7.5 mg tonight  · Daily PT/INR until the INR is stable within the therapeutic range  · Pharmacist will follow and monitor/adjust dosing as necessary    Thank you for this consult,    Tito Shanks, PharmD  Pharmacy Resident  P: [id number]   10/9/2021 7:24 AM

## 2021-10-09 NOTE — PROGRESS NOTES
A call was placed to Oracio Cunha regarding pt /79 and orders for pt home medication Metoprolol extended release 50mg daily.

## 2021-10-10 ENCOUNTER — APPOINTMENT (OUTPATIENT)
Dept: GENERAL RADIOLOGY | Age: 51
DRG: 640 | End: 2021-10-10
Payer: COMMERCIAL

## 2021-10-10 LAB
ALBUMIN SERPL-MCNC: 3.7 G/DL (ref 3.5–5.2)
ALP BLD-CCNC: 73 U/L (ref 40–129)
ALT SERPL-CCNC: 25 U/L (ref 0–40)
ANION GAP SERPL CALCULATED.3IONS-SCNC: 13 MMOL/L (ref 7–16)
AST SERPL-CCNC: 42 U/L (ref 0–39)
BASOPHILS ABSOLUTE: 0.02 E9/L (ref 0–0.2)
BASOPHILS RELATIVE PERCENT: 0.3 % (ref 0–2)
BILIRUB SERPL-MCNC: 0.3 MG/DL (ref 0–1.2)
BUN BLDV-MCNC: 16 MG/DL (ref 6–20)
CALCIUM SERPL-MCNC: 9 MG/DL (ref 8.6–10.2)
CHLORIDE BLD-SCNC: 105 MMOL/L (ref 98–107)
CO2: 22 MMOL/L (ref 22–29)
CREAT SERPL-MCNC: 1.1 MG/DL (ref 0.7–1.2)
EOSINOPHILS ABSOLUTE: 0.02 E9/L (ref 0.05–0.5)
EOSINOPHILS RELATIVE PERCENT: 0.3 % (ref 0–6)
GFR AFRICAN AMERICAN: >60
GFR NON-AFRICAN AMERICAN: >60 ML/MIN/1.73
GLUCOSE BLD-MCNC: 124 MG/DL (ref 74–99)
HCT VFR BLD CALC: 37.4 % (ref 37–54)
HEMOGLOBIN: 12.1 G/DL (ref 12.5–16.5)
IMMATURE GRANULOCYTES #: 0.03 E9/L
IMMATURE GRANULOCYTES %: 0.4 % (ref 0–5)
INR BLD: 3.2
L. PNEUMOPHILA SEROGP 1 UR AG: NORMAL
LYMPHOCYTES ABSOLUTE: 1 E9/L (ref 1.5–4)
LYMPHOCYTES RELATIVE PERCENT: 13 % (ref 20–42)
MCH RBC QN AUTO: 23.9 PG (ref 26–35)
MCHC RBC AUTO-ENTMCNC: 32.4 % (ref 32–34.5)
MCV RBC AUTO: 73.9 FL (ref 80–99.9)
MONOCYTES ABSOLUTE: 0.5 E9/L (ref 0.1–0.95)
MONOCYTES RELATIVE PERCENT: 6.5 % (ref 2–12)
NEUTROPHILS ABSOLUTE: 6.14 E9/L (ref 1.8–7.3)
NEUTROPHILS RELATIVE PERCENT: 79.5 % (ref 43–80)
PDW BLD-RTO: 17.1 FL (ref 11.5–15)
PLATELET # BLD: 248 E9/L (ref 130–450)
PMV BLD AUTO: 10.6 FL (ref 7–12)
POTASSIUM SERPL-SCNC: 3.8 MMOL/L (ref 3.5–5)
PROTHROMBIN TIME: 35.3 SEC (ref 9.3–12.4)
RBC # BLD: 5.06 E12/L (ref 3.8–5.8)
REASON FOR REJECTION: NORMAL
REJECTED TEST: NORMAL
SODIUM BLD-SCNC: 140 MMOL/L (ref 132–146)
STREP PNEUMONIAE ANTIGEN, URINE: NORMAL
TOTAL PROTEIN: 7.1 G/DL (ref 6.4–8.3)
WBC # BLD: 7.7 E9/L (ref 4.5–11.5)

## 2021-10-10 PROCEDURE — 36415 COLL VENOUS BLD VENIPUNCTURE: CPT

## 2021-10-10 PROCEDURE — 2700000000 HC OXYGEN THERAPY PER DAY

## 2021-10-10 PROCEDURE — 2060000000 HC ICU INTERMEDIATE R&B

## 2021-10-10 PROCEDURE — 85610 PROTHROMBIN TIME: CPT

## 2021-10-10 PROCEDURE — 6360000002 HC RX W HCPCS: Performed by: NURSE PRACTITIONER

## 2021-10-10 PROCEDURE — 80053 COMPREHEN METABOLIC PANEL: CPT

## 2021-10-10 PROCEDURE — 2580000003 HC RX 258: Performed by: NURSE PRACTITIONER

## 2021-10-10 PROCEDURE — 6370000000 HC RX 637 (ALT 250 FOR IP): Performed by: NURSE PRACTITIONER

## 2021-10-10 PROCEDURE — 71045 X-RAY EXAM CHEST 1 VIEW: CPT

## 2021-10-10 PROCEDURE — 2500000003 HC RX 250 WO HCPCS: Performed by: NURSE PRACTITIONER

## 2021-10-10 PROCEDURE — 85025 COMPLETE CBC W/AUTO DIFF WBC: CPT

## 2021-10-10 PROCEDURE — 6370000000 HC RX 637 (ALT 250 FOR IP)

## 2021-10-10 RX ORDER — WARFARIN SODIUM 5 MG/1
5 TABLET ORAL
Status: COMPLETED | OUTPATIENT
Start: 2021-10-10 | End: 2021-10-10

## 2021-10-10 RX ADMIN — IPRATROPIUM BROMIDE AND ALBUTEROL 2 PUFF: 20; 100 SPRAY, METERED RESPIRATORY (INHALATION) at 10:28

## 2021-10-10 RX ADMIN — IPRATROPIUM BROMIDE AND ALBUTEROL 2 PUFF: 20; 100 SPRAY, METERED RESPIRATORY (INHALATION) at 18:57

## 2021-10-10 RX ADMIN — IPRATROPIUM BROMIDE AND ALBUTEROL 2 PUFF: 20; 100 SPRAY, METERED RESPIRATORY (INHALATION) at 22:39

## 2021-10-10 RX ADMIN — IPRATROPIUM BROMIDE AND ALBUTEROL 2 PUFF: 20; 100 SPRAY, METERED RESPIRATORY (INHALATION) at 14:00

## 2021-10-10 RX ADMIN — WARFARIN SODIUM 5 MG: 5 TABLET ORAL at 18:58

## 2021-10-10 RX ADMIN — DILTIAZEM HYDROCHLORIDE 120 MG: 120 CAPSULE, COATED, EXTENDED RELEASE ORAL at 10:23

## 2021-10-10 RX ADMIN — CEFTRIAXONE 1000 MG: 1 INJECTION, POWDER, FOR SOLUTION INTRAMUSCULAR; INTRAVENOUS at 06:36

## 2021-10-10 RX ADMIN — IPRATROPIUM BROMIDE AND ALBUTEROL 2 PUFF: 20; 100 SPRAY, METERED RESPIRATORY (INHALATION) at 06:43

## 2021-10-10 RX ADMIN — Medication 10 ML: at 10:26

## 2021-10-10 RX ADMIN — DOXYCYCLINE 100 MG: 100 INJECTION, POWDER, LYOPHILIZED, FOR SOLUTION INTRAVENOUS at 10:23

## 2021-10-10 RX ADMIN — OXYCODONE HYDROCHLORIDE AND ACETAMINOPHEN 500 MG: 500 TABLET ORAL at 10:21

## 2021-10-10 RX ADMIN — FLUTICASONE PROPIONATE 2 SPRAY: 50 SPRAY, METERED NASAL at 10:28

## 2021-10-10 RX ADMIN — Medication 50 MG: at 10:22

## 2021-10-10 RX ADMIN — METOPROLOL SUCCINATE 50 MG: 50 TABLET, EXTENDED RELEASE ORAL at 10:21

## 2021-10-10 NOTE — PROGRESS NOTES
Pharmacy Consultation Note  (Warfarin Dosing and Monitoring)    Initial consult date: 10/9/21  Consulting physician: TERRIE Dao CNP    Allergies:  Patient has no known allergies. 46 y.o. male    Ht Readings from Last 1 Encounters:   10/09/21 6' 3\" (1.905 m)     Wt Readings from Last 1 Encounters:   10/10/21 246 lb 7.6 oz (111.8 kg)         Warfarin Indication Target   INR Range Home Dose  (if applicable) Diet/Feeding Tube   (Enteral feeds, nutritional drinks, increased Vitamin K in diet can decrease INR)   hx of DVT/PE and lupus anticoagulation disorder  2.5-3.5 alternating warfarin 10 mg and 7.5 mg each day (10 mg four times per week and 7.5 mg three times per week) Adult diet; regular; low fat/low chol/high fiber/2 gm Na       x Home Med? Meds Increasing INR x Home Med?  Meds Decreasing INR     Allopurinol    Azathioprine     Amiodarone/Propafenone/Dronedarone   Carbamazepine     Androgens   Cholestyramine     Chemotherapy (BBW: Capecitabine)   Estrogen     Ciprofloxacin/Levofloxacin   Nafcillin/Dicloxacillin     Clarithromycin/Erythromycin/Azithromycin   Barbiturates      Fluconazole/Itraconazole/Voriconazole/Ketoconazole   Phenytoin (Variable)     Metronidazole   Rifampin     Phenytoin (Variable)   Steroids (Variable/Dose Dependent)      Statins/Fenofibrate/Gemfibrozil   Sucralfate     Steroids (Variable/Dose Dependent)   Other:     Sulfamethoxazole/Trimethoprim        Tramadol         Other:        Comments regarding medication interactions:      x Diseases Affecting INR x Increased Bleeding Risk    CHF Exacerbation (Increases)  History GI Bleed/PUD    Liver Disease (Increases)  Chronic NSAID Use    Thyroid: Hyper (Increases)  Hypo (Decreases)  Chronic ASA/Antiplatelet Use (Clopidogrel/ Dipyridamole/Prasugrel/Ticagrelor)      Malignancy (Increases)  Abnormal Renal Function (dialysis, renal transplant, SCr ? 2.3 mg/dL)     History of EtOH Abuse: Acute (Increases)   Chronic (Decreases) Liver Function (cirrhosis, bilirubin >2x ULN with AST/ALT/AP >3x ULN)    Fever (Increases)  Age > 65 years    Acute infection (Increases)  Hypertension/Uncontrolled BP    Diarrhea/Dehydration (Increases)  History of stroke    Other: __________________  Other:___________________       Vitamin K or Blood product  Administration Date                    TSH:  No results found for: TSH     Hepatic Function Panel:                            Lab Results   Component Value Date    ALKPHOS 56 10/09/2021    ALT 19 10/09/2021    AST 28 10/09/2021    PROT 6.9 10/09/2021    BILITOT 0.3 10/09/2021    LABALBU 3.6 10/09/2021       Date Warfarin Dose INR Heparin or LMWH HGB/HCT PLT Comment   10/9 7.5 mg 2.5 -- 11.5/34.4 203    10/10 5 mg 3.2 -- 12.1/37.4 248                                 Assessment:  · Patient is a 45 yo male   · Patient is on warfarin for hx of DVT/PE and lupus anticoagulation disorder.    · Goal INR 2.5-3.5  · 10/9: INR 2.5 today  · 10/10: INR 3.2 today; 33% reduction in dose d/t 0.7 increase in INR in 24 hours    Plan:  · Warfarin 5 mg x 1 tonight  · Daily PT/INR until the INR is stable within the therapeutic range  · Pharmacist will follow and monitor/adjust dosing as necessary    Thank you for this consult,    Gayle Sal, PharmD  Pharmacy Resident  P: [id number]   10/10/2021 7:18 AM

## 2021-10-10 NOTE — PROGRESS NOTES
Subjective: The patient is awake and alert, states he feels much better. Currently on 5L NC. No acute events overnight. Denies chest pain, angina, SOB     Objective:    /82   Pulse 73   Temp 97.6 °F (36.4 °C) (Oral)   Resp 20   Ht 6' 3\" (1.905 m)   Wt 246 lb 7.6 oz (111.8 kg)   SpO2 93%   BMI 30.81 kg/m²     In: 480 [P.O.:480]  Out: 500   In: 480   Out: 500 [Urine:500]    General appearance: NAD, conversant  HEENT: AT/NC, MMM  Neck: FROM, supple  Lungs: Clear to auscultation  CV: RRR, no MRGs  Vasc: Radial pulses 2+  Abdomen: Soft, non-tender; no masses or HSM  Extremities: No peripheral edema or digital cyanosis  Skin: no rash, lesions or ulcers  Psych: Alert and oriented to person, place and time  Neuro: Alert and interactive     Recent Labs     10/09/21  0111 10/09/21  0732   WBC 3.9* 3.6*   HGB 11.5* 11.5*   HCT 34.4* 35.0*    191       Recent Labs     10/09/21  0111 10/09/21  0732 10/09/21  1713   * 135 136   K 4.1 4.2 3.9   CL 98 104 107   CO2 17* 22 19*   BUN 15 13 13   CREATININE 1.2 1.2 1.1   CALCIUM 8.8 8.9 8.5*       Assessment:    Principal Problem:    Acute respiratory failure with hypoxia (HCC)  Active Problems:    Pneumonia    Elevated troponin    History of DVT (deep vein thrombosis)    Hx of aortic valve replacement    Hyponatremia  Resolved Problems:    * No resolved hospital problems.  *      Plan:    Acute respiratory failure with hypoxia-picture looks like Covid pneumonia however tested negative x2 and patient is fully vaccinated  -Covid test negative x2 however CTA suggestive of extensive COVID-19 pneumonia bilaterally  -Inflammatory markers including fibrinogen and D-dimer CRP are all elevated along with leukopenia  -Rocephin daily/Doxy twice daily  -DuoNebs  Discontinue IV fluids  -Infectious disease consult appreciated, unclear etiology of shortness of breath with Covid picture but negative tests -possibly volume overload  -Legionella and strep pneumoniae

## 2021-10-10 NOTE — PROGRESS NOTES
[facility] PROGRESS NOTE      Chief complaint: Follow-up of concern for pneumonia    The patient is a 46 y.o. 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 On admission, he was afebrile, hypoxic on room air 87% oxygen saturation improving to 94% on 6 l/min oxygen by nasal cannula and hemodynamically stable with leukocytopenia of 3600. Respiratory pathogen PCR panel and SARS-CoV-2 PCR (tested twice) were negative. Chest CTA showed extensive groundglass densities throughout bilateral lungs and no pulmonary embolism. Procalcitonin level was not elevated at 0.18 ng/mL. Urine Streptococcus pneumoniae and Legionella antigens were negative. Blood cultures showed no growth to date. Ceftriaxone and doxycycline, as well as dexamethasone, were started on admission. Subjective: Patient was seen and examined. No chills, no abdominal pain, no diarrhea, no rash, no itching. He reporst feeling better. Objective:    Vitals:    10/10/21 0756   BP: 136/82   Pulse: 73   Resp: 20   Temp: 97.6 °F (36.4 °C)   SpO2: 93%     Constitutional: Alert, not in distress  Respiratory: Clear breath sounds, no crackles, no wheezes  Cardiovascular: Regular rate and rhythm, no murmurs  Gastrointestinal: Bowel sounds present, soft, nontender  Skin: Warm and dry, no active dermatoses  Musculoskeletal: No joint swelling, no joint erythema    Labs, imaging, and medical records/notes were personally reviewed. Assessment:  Acute hypoxic respiratory failure  Bilateral lung infiltrates, etiology unclear, suspect CHF vs pneumonia, probably viral, other than COVID-19. SARS-CoV-2 PCR (tested twice) was negative. History of mechanical aortic valve replacement in 2019     Recommendations:  Stop ceftriaxone and doxycycline. Monitor clinically off antibiotics for now.   Follow up TTE.  Continue supportive care. Thank you for involving me in the care of [employer]. I will sign off for now. Please do not hesitate to call for any questions, concerns, or new findings.     Electronically signed by Jackson Redmond MD on 10/10/2021 at 10:48 AM

## 2021-10-11 VITALS
WEIGHT: 246.47 LBS | HEIGHT: 75 IN | DIASTOLIC BLOOD PRESSURE: 93 MMHG | SYSTOLIC BLOOD PRESSURE: 147 MMHG | TEMPERATURE: 98 F | HEART RATE: 83 BPM | RESPIRATION RATE: 15 BRPM | BODY MASS INDEX: 30.65 KG/M2 | OXYGEN SATURATION: 96 %

## 2021-10-11 LAB
ALBUMIN SERPL-MCNC: 3.3 G/DL (ref 3.5–5.2)
ALBUMIN SERPL-MCNC: 3.4 G/DL (ref 3.5–5.2)
ALP BLD-CCNC: 49 U/L (ref 40–129)
ALP BLD-CCNC: 55 U/L (ref 40–129)
ALT SERPL-CCNC: 32 U/L (ref 0–40)
ALT SERPL-CCNC: 37 U/L (ref 0–40)
ANION GAP SERPL CALCULATED.3IONS-SCNC: 16 MMOL/L (ref 7–16)
ANION GAP SERPL CALCULATED.3IONS-SCNC: 9 MMOL/L (ref 7–16)
AST SERPL-CCNC: 62 U/L (ref 0–39)
AST SERPL-CCNC: 69 U/L (ref 0–39)
BASOPHILS ABSOLUTE: 0.04 E9/L (ref 0–0.2)
BASOPHILS RELATIVE PERCENT: 0.7 % (ref 0–2)
BILIRUB SERPL-MCNC: 0.3 MG/DL (ref 0–1.2)
BILIRUB SERPL-MCNC: 0.4 MG/DL (ref 0–1.2)
BUN BLDV-MCNC: 15 MG/DL (ref 6–20)
BUN BLDV-MCNC: 15 MG/DL (ref 6–20)
CALCIUM SERPL-MCNC: 9 MG/DL (ref 8.6–10.2)
CALCIUM SERPL-MCNC: 9.2 MG/DL (ref 8.6–10.2)
CHLORIDE BLD-SCNC: 102 MMOL/L (ref 98–107)
CHLORIDE BLD-SCNC: 104 MMOL/L (ref 98–107)
CO2: 18 MMOL/L (ref 22–29)
CO2: 24 MMOL/L (ref 22–29)
CREAT SERPL-MCNC: 1.1 MG/DL (ref 0.7–1.2)
CREAT SERPL-MCNC: 1.1 MG/DL (ref 0.7–1.2)
EOSINOPHILS ABSOLUTE: 0.11 E9/L (ref 0.05–0.5)
EOSINOPHILS RELATIVE PERCENT: 1.9 % (ref 0–6)
GFR AFRICAN AMERICAN: >60
GFR AFRICAN AMERICAN: >60
GFR NON-AFRICAN AMERICAN: >60 ML/MIN/1.73
GFR NON-AFRICAN AMERICAN: >60 ML/MIN/1.73
GLUCOSE BLD-MCNC: 106 MG/DL (ref 74–99)
GLUCOSE BLD-MCNC: 148 MG/DL (ref 74–99)
HCT VFR BLD CALC: 39.2 % (ref 37–54)
HEMOGLOBIN: 12.3 G/DL (ref 12.5–16.5)
IMMATURE GRANULOCYTES #: 0.04 E9/L
IMMATURE GRANULOCYTES %: 0.7 % (ref 0–5)
INR BLD: 3.3
LYMPHOCYTES ABSOLUTE: 1.3 E9/L (ref 1.5–4)
LYMPHOCYTES RELATIVE PERCENT: 22 % (ref 20–42)
MCH RBC QN AUTO: 23.3 PG (ref 26–35)
MCHC RBC AUTO-ENTMCNC: 31.4 % (ref 32–34.5)
MCV RBC AUTO: 74.2 FL (ref 80–99.9)
MONOCYTES ABSOLUTE: 0.36 E9/L (ref 0.1–0.95)
MONOCYTES RELATIVE PERCENT: 6.1 % (ref 2–12)
NEUTROPHILS ABSOLUTE: 4.07 E9/L (ref 1.8–7.3)
NEUTROPHILS RELATIVE PERCENT: 68.6 % (ref 43–80)
PDW BLD-RTO: 17.2 FL (ref 11.5–15)
PLATELET # BLD: 190 E9/L (ref 130–450)
PMV BLD AUTO: 10.3 FL (ref 7–12)
POTASSIUM SERPL-SCNC: 3.8 MMOL/L (ref 3.5–5)
POTASSIUM SERPL-SCNC: 4 MMOL/L (ref 3.5–5)
PROTHROMBIN TIME: 35.9 SEC (ref 9.3–12.4)
RBC # BLD: 5.28 E12/L (ref 3.8–5.8)
REASON FOR REJECTION: NORMAL
REJECTED TEST: NORMAL
SODIUM BLD-SCNC: 135 MMOL/L (ref 132–146)
SODIUM BLD-SCNC: 138 MMOL/L (ref 132–146)
TOTAL PROTEIN: 6.3 G/DL (ref 6.4–8.3)
TOTAL PROTEIN: 7.1 G/DL (ref 6.4–8.3)
WBC # BLD: 5.9 E9/L (ref 4.5–11.5)

## 2021-10-11 PROCEDURE — 36415 COLL VENOUS BLD VENIPUNCTURE: CPT

## 2021-10-11 PROCEDURE — 85025 COMPLETE CBC W/AUTO DIFF WBC: CPT

## 2021-10-11 PROCEDURE — 6370000000 HC RX 637 (ALT 250 FOR IP): Performed by: NURSE PRACTITIONER

## 2021-10-11 PROCEDURE — 97165 OT EVAL LOW COMPLEX 30 MIN: CPT

## 2021-10-11 PROCEDURE — 2700000000 HC OXYGEN THERAPY PER DAY

## 2021-10-11 PROCEDURE — 85610 PROTHROMBIN TIME: CPT

## 2021-10-11 PROCEDURE — 97161 PT EVAL LOW COMPLEX 20 MIN: CPT | Performed by: PHYSICAL THERAPIST

## 2021-10-11 PROCEDURE — 80053 COMPREHEN METABOLIC PANEL: CPT

## 2021-10-11 RX ORDER — BENZONATATE 100 MG/1
100 CAPSULE ORAL EVERY 8 HOURS PRN
Qty: 21 CAPSULE | Refills: 0 | Status: SHIPPED | OUTPATIENT
Start: 2021-10-11 | End: 2021-10-18

## 2021-10-11 RX ORDER — FUROSEMIDE 20 MG/1
20 TABLET ORAL DAILY
Qty: 60 TABLET | Refills: 3 | Status: SHIPPED | OUTPATIENT
Start: 2021-10-11 | End: 2021-12-18

## 2021-10-11 RX ORDER — WARFARIN SODIUM 5 MG/1
7.5 TABLET ORAL
Status: DISCONTINUED | OUTPATIENT
Start: 2021-10-11 | End: 2021-10-11 | Stop reason: HOSPADM

## 2021-10-11 RX ADMIN — IPRATROPIUM BROMIDE AND ALBUTEROL 2 PUFF: 20; 100 SPRAY, METERED RESPIRATORY (INHALATION) at 14:00

## 2021-10-11 RX ADMIN — OXYCODONE HYDROCHLORIDE AND ACETAMINOPHEN 500 MG: 500 TABLET ORAL at 10:44

## 2021-10-11 RX ADMIN — METOPROLOL SUCCINATE 50 MG: 50 TABLET, EXTENDED RELEASE ORAL at 10:44

## 2021-10-11 RX ADMIN — IPRATROPIUM BROMIDE AND ALBUTEROL 2 PUFF: 20; 100 SPRAY, METERED RESPIRATORY (INHALATION) at 10:00

## 2021-10-11 RX ADMIN — CETIRIZINE HYDROCHLORIDE 10 MG: 10 TABLET, FILM COATED ORAL at 10:44

## 2021-10-11 RX ADMIN — Medication 50 MG: at 10:44

## 2021-10-11 RX ADMIN — IPRATROPIUM BROMIDE AND ALBUTEROL 2 PUFF: 20; 100 SPRAY, METERED RESPIRATORY (INHALATION) at 06:55

## 2021-10-11 RX ADMIN — FLUTICASONE PROPIONATE 2 SPRAY: 50 SPRAY, METERED NASAL at 10:45

## 2021-10-11 NOTE — PROGRESS NOTES
Physical Therapy  Physical Therapy Initial Assessment     Name: Laz Marquez  : 1970  MRN: [id number]    Date of Service: 10/11/2021    Evaluating PT:  Andrez Tellez, PT, DPT [id number]    Room #:  6452/3235-W  Diagnosis:  Pneumonia [J18.9]  Acute respiratory failure with hypoxia ([address] [J96.01]  Pneumonia due to infectious organism, unspecified laterality, unspecified part of lung [J18.9]  PMHx/PSHx:  Lupus anticoagulant disorder, DVT, hernia repair x2 aortic valve replacement  Procedure/Surgery:  None this admission  Precautions:  Droplet plus iso (- COVID test x2)  Equipment Needs:  none    SUBJECTIVE:    Pt lives with his wife and 2 sons in a 2 story home with 2 stairs to enter and no rail. He has a full flight of stairs with B rails to second floor. Bed is on first floor and bath is on first floor. Pt ambulated with no AD PTA. Pt works writing contracts. OBJECTIVE:   Initial Evaluation  Date: 10/11/21 Treatment Short Term/ Long Term   Goals   AM-PAC 6 Clicks 33/47     Was pt agreeable to Eval/treatment? yes     Does pt have pain? Mild aching of B UEs     Bed Mobility  Rolling: Independent  Supine to sit: Independent  Sit to supine: Independent  Scooting: Independent  NA   Transfers Sit to stand: Independent  Stand to sit: Independent  Stand pivot: Independent  NA   Ambulation    75 feet with no AD Independent  NA   Stair negotiation: ascended and descended  NT due to isolation status.    NA   ROM BUE:  WNL  BLE:  WNL     Strength BUE:  4+/5  BLE:  4+/5     Balance Sitting EOB: Independent with no AD  Dynamic Standing:  Independent with no AD  NA     Pt is A & O x 4  Sensation:  Pt denies numbness and tingling to extremities  Edema:  unremarkable    Vitals:  97-95% on room air at rest and during ambulation    Patient education  Pt educated on role of therapy, objective findings of eval    Patient response to education:   Pt verbalized understanding Pt demonstrated skill Pt requires further education in this area   yes yes no     ASSESSMENT:    Conditions Requiring Skilled Therapeutic Intervention: N/A    []Decreased strength     []Decreased ROM  []Decreased functional mobility  []Decreased balance   []Decreased endurance   []Decreased posture  []Decreased sensation  []Decreased coordination   []Decreased vision  []Decreased safety awareness   []Increased pain       Comments:  Pt sitting EOB upon arrival, agreeable to PT eval. Pt completed all mobility with safe technique and no external assist required. Pt maintained SPO2 WNL as noted above throughout mobility. Pt reports feeling \"a little off\" with regards to his breathing but denies dyspnea. Pt unable to complete stairs at this time due to isolation status, he feels he will not have issues with stair negotiation to enter/exit home. Pt returned to EOB upon completion of session with all needs in reach. He has no skilled PT needs. Treatment:  Patient practiced and was instructed in the following treatment:     Education regarding activity pacing    Pt's/ family goals   1. To return home independenty    Patient and or family understand(s) diagnosis, prognosis, and plan of care. yes    PHYSICAL THERAPY PLAN OF CARE:    PT POC is established based on physician order and patient diagnosis     Referring provider/PT Order:    10/09/21 0700  PT eval and treat Start: 10/09/21 0700, End: 10/09/21 0700, ONE TIME, Standing Count: 1 Occurrences, R      Rody Dueñas, TERRIE - CNP     Diagnosis:  Pneumonia [J18.9]  Acute respiratory failure with hypoxia ([facility] [address] [J96.01]  Pneumonia due to infectious organism, unspecified laterality, unspecified part of lung [J18.9]  Specific instructions for next treatment:  D/C therapy at this time, no skilled needs.     Current Treatment Recommendations: N/A    [] Strengthening to improve independence with functional mobility   [] ROM to improve independence with functional mobility   [] Balance Training to improve static/dynamic balance and to reduce fall risk  [] Endurance Training to improve activity tolerance during functional mobility   [] Transfer Training to improve safety and independence with all functional transfers   [] Gait Training to improve gait mechanics, endurance and asses need for appropriate assistive device  [] Stair Training in preparation for safe discharge home and/or into the community   [] Positioning to prevent skin breakdown and contractures  [] Safety and Education Training   [] Patient/Caregiver Education   [] HEP  [] Other     Based on pt's current level of functional independence for all mobility, this pt is not a candidate for continued skilled PT services. Will remove pt from PT caseload. Please re-consult if pt experiences functional decline. Thank you. Time in: 0907  Time out: 0920    Total Treatment Time 0 minutes     Evaluation Time includes thorough review of current medical information, gathering information on past medical history/social history and prior level of function, completion of standardized testing/informal observation of tasks, assessment of data and education on plan of care and goals. .    CPT codes:  [x] Low Complexity PT evaluation 13392  [] Moderate Complexity PT evaluation 06682  [] High Complexity PT evaluation 26122  [] PT Re-evaluation 02171  [] Gait training 52289 0 minutes  [] Manual therapy 60380 0 minutes  [] Therapeutic activities 24059 0 minutes  [] Therapeutic exercises 16225 0 minutes  [] Neuromuscular reeducation 29526 0 minutes     Oliva Huff, PT, DPT  [id number]

## 2021-10-11 NOTE — DISCHARGE SUMMARY
Physician Discharge Summary     Patient ID:  Lanie Damon  [id number]  46 y.o.  1970    Admit date: 10/9/2021    Discharge date and time: 10/11/2021    Admission Diagnoses:   Patient Active Problem List   Diagnosis    Acute respiratory failure with hypoxia (HCC)    Pneumonia    Elevated troponin    History of DVT (deep vein thrombosis)    Hx of aortic valve replacement    Hyponatremia       Discharge Diagnoses: volume overload     Consults: ID     Procedures: *none     Hospital Course:   Acute respiratory failure with hypoxia-picture looks like Covid pneumonia however tested negative x2 and patient is fully vaccinated  -Covid test negative x2 however CTA suggestive of extensive COVID-19 pneumonia bilaterally  -Inflammatory markers including fibrinogen and D-dimer CRP are all elevated along with leukopenia  -Rocephin daily/Doxy twice daily  -DuoNebs  Discontinue IV fluids  -Infectious disease consult appreciated, unclear etiology of shortness of breath with Covid picture but negative tests -possibly volume overload  -Legionella and strep pneumoniae negative  -Respiratory culture pending  -Respiratory panel negative  -Blood cultures negative x2  -6L O2 >>5L NC>> titrate to keep oxygen sat> 93%- currently of o2 nd saturating well   -Daily labs  -Monitor vital signs- stable      Elevated troponin  -Patient does have an extensive cardiac history and follows in [place]-history of valve replacement  -Delta troponin positive, 12 >>>35>>> 24  -Repeat troponin x1  -Cardiac enzymes essentially unremarkable without chest pain  Check echocardiogram to assess for volume overload/cardiomyopathy-patient follows in [facility] for his cardiac issues and has a known history of aortic valve replacement- family anxious for dc - no immediate acute need for it if not yet done   [id number] [address] for home         Hyponatremia, resolved-may be from volume overload  -128>> 135  -Daily labs, continue to monitor  -Gentle diuresis with Lasix 20 p.o. daily at home , repeat chest x-ray- normal      Hx DVT due to antiphospholipid syndrome  -On coumadin  -Therapeutic INR 2.5--pending today  -Home dosing resumed: Coumadin 7.5mg 4x weekly, Coumadin 10mg 3x weekly  -Pharmacy to dose   Recent Labs     10/09/21  0732 10/10/21  1345 10/11/21  0936   WBC 3.6* 7.7 5.9   HGB 11.5* 12.1* 12.3*   HCT 35.0* 37.4 39.2    248 190       Recent Labs     10/10/21  1345 10/11/21  0619 10/11/21  0936    135 138   K 3.8 4.0 3.8    102 104   CO2 22 24 18*   BUN 16 15 15   CREATININE 1.1 1.1 1.1   CALCIUM 9.0 9.0 9.2       CT HEAD WO CONTRAST    Result Date: 10/9/2021  EXAMINATION: CT OF THE HEAD WITHOUT CONTRAST  10/9/2021 3:15 am TECHNIQUE: CT of the head was performed without the administration of intravenous contrast. Dose modulation, iterative reconstruction, and/or weight based adjustment of the mA/kV was utilized to reduce the radiation dose to as low as reasonably achievable. COMPARISON: None. HISTORY: ORDERING SYSTEM PROVIDED HISTORY: headache TECHNOLOGIST PROVIDED HISTORY: Has a \"code stroke\" or \"stroke alert\" been called? ->No Reason for exam:->headache Decision Support Exception - unselect if not a suspected or confirmed emergency medical condition->Emergency Medical Condition (MA) What reading provider will be dictating this exam?->CRC FINDINGS: BRAIN/VENTRICLES: There is no acute intracranial hemorrhage, mass effect or midline shift. No abnormal extra-axial fluid collection. The gray-white differentiation is maintained without evidence of an acute infarct. There is no evidence of hydrocephalus. ORBITS: The bilateral globes are intact. SINUSES: The visualized paranasal sinuses and mastoid air cells demonstrate no acute abnormality. SOFT TISSUES/SKULL:  No acute abnormality of the visualized skull or soft tissues. No acute intracranial abnormality.      XR CHEST PORTABLE    Result Date: 10/10/2021  EXAMINATION: ONE XRAY VIEW OF THE CHEST 10/10/2021 3:51 pm COMPARISON: October 9, 2021 HISTORY: ORDERING SYSTEM PROVIDED HISTORY: Shortness of breath, follow-up on volume overload TECHNOLOGIST PROVIDED HISTORY: Reason for exam:->Shortness of breath, follow-up on volume overload What reading provider will be dictating this exam?->CRC FINDINGS: There is cardiomegaly. There is significant improvement in the previously noted patchy infiltrates in the lungs with minimal amount of residual infiltrates. Significant improvement in the previously noted bilateral infiltrates which may be due to improving pneumonia or edema. XR CHEST PORTABLE    Result Date: 10/9/2021  EXAMINATION: ONE XRAY VIEW OF THE CHEST 10/9/2021 1:16 am COMPARISON: 01/30/2017. HISTORY: ORDERING SYSTEM PROVIDED HISTORY: Shortness of breath TECHNOLOGIST PROVIDED HISTORY: Reason for exam:->Shortness of breath What reading provider will be dictating this exam?->CRC FINDINGS: Bilateral infiltrates left greater than right. .  There is no effusion or pneumothorax. The cardiomediastinal silhouette is without acute process. The osseous structures are without acute process. Sternotomy wires and prosthetic heart valve noted. Bilateral infiltrates left greater than right. CTA PULMONARY W CONTRAST    Result Date: 10/9/2021  EXAMINATION: CTA OF THE CHEST 10/9/2021 3:15 am TECHNIQUE: CTA of the chest was performed after the administration of intravenous contrast.  Multiplanar reformatted images are provided for review. MIP images are provided for review. Dose modulation, iterative reconstruction, and/or weight based adjustment of the mA/kV was utilized to reduce the radiation dose to as low as reasonably achievable.  COMPARISON: 01/30/2017 HISTORY: ORDERING SYSTEM PROVIDED HISTORY: Shortness of breath, rule out PE TECHNOLOGIST PROVIDED HISTORY: Reason for exam:->pe Decision Support Exception - unselect if not a suspected or confirmed emergency medical condition->Emergency Medical Condition (MA) What reading provider will be dictating this exam?->CRC FINDINGS: Pulmonary Arteries: Pulmonary arteries are adequately opacified for evaluation. No evidence of intraluminal filling defect to suggest pulmonary embolism. Main pulmonary artery is normal in caliber. Mediastinum: Aortic valve prosthesis in place. No pericardial effusion. No adenopathy. Lungs/pleura: No pneumothorax. No pleural effusion. Extensive ground-glass densities throughout the bilateral lungs, compatible with multifocal pneumonia. Upper Abdomen: Status post cholecystectomy. A 1.3 x 1.1 cm hypodensity in the anterior aspect of the pancreatic body, increased in size compared to the prior study where it measured up to 0.4 x 0.4 cm. .  A partially visualized left renal cysts measuring at least 2.6 cm in diameter. Soft Tissues/Bones: No acute bone or soft tissue abnormality. Intact sternotomy wires. No evidence of pulmonary embolism. Findings suggestive of extensive COVID-19 pneumonia bilaterally. A cystic area in the pancreatic body. Differential diagnosis include pancreatic pseudocyst, branch duct dilatation, or cystic IPMN. MRI/MRCP is recommended for further evaluation. Discharge Exam:    HEENT: NCAT,  PERRLA, No JVD  Heart:  RRR, no murmurs, gallops, or rubs.   Lungs:  CTA bilaterally, no wheeze, rales or rhonchi  Abd: bowel sounds present, nontender, nondistended, no masses  Extrem:  No clubbing, cyanosis, or edema    Disposition: home     Patient Condition at Discharge: stable     Patient Instructions:      Medication List      START taking these medications    benzonatate 100 MG capsule  Commonly known as: TESSALON  Take 1 capsule by mouth every 8 hours as needed for Cough     furosemide 20 MG tablet  Commonly known as: Lasix  Take 1 tablet by mouth daily        CONTINUE taking these medications    dilTIAZem 120 MG extended release capsule  Commonly known as: CARDIZEM CD     fluticasone 50 MCG/ACT nasal spray  Commonly known as: FLONASE  2 sprays in each nostril bid     loratadine 10 MG capsule  Commonly known as: CLARITIN  Take 1 capsule by mouth daily     metoprolol succinate 25 MG extended release tablet  Commonly known as: TOPROL XL     * warfarin 7.5 MG tablet  Commonly known as: COUMADIN     * warfarin 10 MG tablet  Commonly known as: COUMADIN         * This list has 2 medication(s) that are the same as other medications prescribed for you. Read the directions carefully, and ask your doctor or other care provider to review them with you. Where to Get Your Medications      These medications were sent to [address] [phone]  Formerly named [facility] [address], [address] [place] [address]    Phone: [phone]   · benzonatate 100 MG capsule  · furosemide 20 MG tablet       Activity: activity as tolerated  Diet: cardiac diet    Pt has been advised to: Follow-up with Von Esteban DO in 1 week.   Follow-up with consultants as recommended by them    Note that over 30 minutes was spent in preparing discharge papers, discussing discharge with patient, medication review, etc.    Signed:  Wendy Cottrell MD  10/11/2021  4:48 PM

## 2021-10-11 NOTE — CARE COORDINATION
Care Coordination  The patient was admitted due to altered mental status. The patient had a headache sat on room air was 89% and was placed on 4 liters n/c. The patient was placed on iv rocephin and iv doxy  but those have been stooped. Per the patient he lives with his wife and 2 sons in a 2 story home with 2 steps to get into the house. His bed and bath are on the second floor. The patient was independent pta. His Pt ampac was 23/24his plan is to return home with his wife and she is his ride home once he is medically stable.  I will follow

## 2021-10-11 NOTE — PROGRESS NOTES
Pharmacy Consultation Note  (Warfarin Dosing and Monitoring)    Initial consult date: 10/9/21  Consulting physician: TERRIE Dao CNP    Allergies:  Patient has no known allergies. 46 y.o. male    Ht Readings from Last 1 Encounters:   10/09/21 6' 3\" (1.905 m)     Wt Readings from Last 1 Encounters:   10/10/21 246 lb 7.6 oz (111.8 kg)         Warfarin Indication Target   INR Range Home Dose  (if applicable) Diet/Feeding Tube   (Enteral feeds, nutritional drinks, increased Vitamin K in diet can decrease INR)   hx of DVT/PE and lupus anticoagulation disorder  S/p aortic valve replacement  2.5-3.5 alternating warfarin 10 mg and 7.5 mg each day (10 mg four times per week and 7.5 mg three times per week) Adult diet; regular; low fat/low chol/high fiber/2 gm Na       x Home Med? Meds Increasing INR x Home Med?  Meds Decreasing INR     Allopurinol    Azathioprine     Amiodarone/Propafenone/Dronedarone   Carbamazepine     Androgens   Cholestyramine     Chemotherapy (BBW: Capecitabine)   Estrogen     Ciprofloxacin/Levofloxacin   Nafcillin/Dicloxacillin     Clarithromycin/Erythromycin/Azithromycin   Barbiturates      Fluconazole/Itraconazole/Voriconazole/Ketoconazole   Phenytoin (Variable)     Metronidazole   Rifampin     Phenytoin (Variable)   Steroids (Variable/Dose Dependent)      Statins/Fenofibrate/Gemfibrozil   Sucralfate     Steroids (Variable/Dose Dependent)   Other:     Sulfamethoxazole/Trimethoprim        Tramadol         Other:        Comments regarding medication interactions:      x Diseases Affecting INR x Increased Bleeding Risk    CHF Exacerbation (Increases)  History GI Bleed/PUD    Liver Disease (Increases)  Chronic NSAID Use    Thyroid: Hyper (Increases)  Hypo (Decreases)  Chronic ASA/Antiplatelet Use (Clopidogrel/ Dipyridamole/Prasugrel/Ticagrelor)      Malignancy (Increases)  Abnormal Renal Function (dialysis, renal transplant, SCr ? 2.3 mg/dL)     History of EtOH Abuse: Acute (Increases)   Chronic (Decreases)  Liver Function (cirrhosis, bilirubin >2x ULN with AST/ALT/AP >3x ULN)    Fever (Increases)  Age > 65 years    Acute infection (Increases)  Hypertension/Uncontrolled BP    Diarrhea/Dehydration (Increases)  History of stroke    Other: __________________  Other:___________________       Vitamin K or Blood product  Administration Date                    TSH:  No results found for: TSH     Hepatic Function Panel:                            Lab Results   Component Value Date    ALKPHOS 55 10/11/2021    ALT 37 10/11/2021    AST 69 10/11/2021    PROT 7.1 10/11/2021    BILITOT 0.4 10/11/2021    LABALBU 3.3 10/11/2021       Date Warfarin Dose INR Heparin or LMWH HGB/HCT PLT Comment   10/9 7.5 mg 2.5 -- 11.5/34.4 203    10/10 5 mg 3.2 -- 12.1/37.4 248    10/11 7.5 mg 3.3 X 12.3/39.2 190                        Assessment:  · Patient is a 47 yo male   · Patient is on warfarin for hx of DVT/PE and lupus anticoagulation disorder.  Patient has history of aortic valve replacement  · Goal INR 2.5-3.5  · 10/9: INR 2.5 today  · 10/10: INR 3.2 today; 33% reduction in dose d/t 0.7 increase in INR in 24 hours  · 10/11: INR 3.3 today, resume home dosing    Plan:  · Warfarin 7.5 mg x 1 tonight  · Daily PT/INR until the INR is stable within the therapeutic range  · Pharmacist will follow and monitor/adjust dosing as necessary    Thank you for this consult,    Regino Love, PharmD   Pharmacy Resident   Phone: [phone]  10/11/2021 1:28 PM

## 2021-10-11 NOTE — PROGRESS NOTES
OCCUPATIONAL THERAPY INITIAL EVALUATION    [address] [address] [address]      Date:10/11/2021                                                  Patient Name: Nery Brock  MRN: [id number]  : 1970  Room: [address] [place] [address]A    Evaluating OT: MARCY Call, OTR/L [id number]  Referring Provider:TERRIE Dao CNP  Specific Provider Orders: OT eval and treat 10/9  Recommended Adaptive Equipment: none     Diagnosis: respiratory failure   Surgery: none   Pertinent Medical History: none on file   Precautions:  Fall Risk, DROPLET +    Assessment of current deficits   [x] Functional mobility  [x]ADLs  [x] Strength               [x]Cognition   [x] Functional transfers   [x] IADLs         [x] Safety Awareness   [x]Endurance   [] Fine Coordination              [x] Balance      [] Vision/perception   [x]Sensation    []Gross Motor Coordination  [] ROM  [] Delirium                   [] Motor Control     Home Living: Pt lives with family in a 1 story home; bed/bath on main level   Bathroom setup: tub shower unit   Equipment owned: none  Prior Level of Function: IND with ADLs/IADLs; using NO ad for functional mobility   Driving: yes  Occupation: write contracts    Pain Level: 0/10  Cognition: A&O: 4/4; Follows multi step directions    Memory:  good    Sequencing:  good    Problem solving:  good    Judgement/safety:  good     Functional Assessment:  AM-PAC Daily Activity Raw Score: 23/24   Initial Eval Status  Date: 10/11/21   Feeding IND (pt able to open packages and self feed)    Grooming IND (standing at sink)    UB Dressing IND    LB Dressing IND (pt able to doff/aaron socks)   Bathing SBA (simulated)   Toileting IND (pt able to complete dirk hygiene and pants management)   Bed Mobility  Log roll: IND  Supine to sit: IND   Sit to supine: IND    Functional Transfers Sit to stand:IND   Stand to sit:IND  Commode: IND   Functional Mobility IND (using no AD, to/from bathroom 2x to assess oxygen level)   Balance Sitting: IND  Standing: IND   Activity Tolerance good   Visual/  Perceptual Glasses: yes          Hand dominance: R  UE ROM: RUE:  WFL  LUE:  WFL  Strength: RUE: grossly 4/5 LUE: grossly 4/5   Strength: B WFL  Fine Motor Coordination:  WFL     Hearing: WFL  Sensation: No c/o numbness/tingling   Tone:  WFL  Edema: none noted                            Comments:Cleared by RN to see pt. Upon arrival, patient supine in bed and agreeable to OT session. At end of session, patient supine in bed with call light and phone within reach, all lines and tubes intact. Treatment: Completed ADLs/functional tranfers, see above for assessment. Pt completes ADLs/functional mobility/transfers Mod I, demo'ing good balance/safety awareness. Pt required skilled monitoring of SpO2 during session. Pt appeared to have tolerated session well. Pt educated on shower chair for home if needed. Pt demonstrates no OT needs at this time. Rest: 96% RA  During activity: 94% RA  End of session: 95% RA    Eval Complexity: Low    Patient instructed on diagnosis, prognosis/goals and plan of care. Demonstrated good understanding. [] Malnutrition indicators have been identified and nursing has been notified to ensure a dietitian consult is ordered. Evaluation time includes thorough review of current medical information, gathering information on past medical & social history & PLOF, completion of standardized testing, informal observation of tasks, consultation with other medical professions/disciplines, assessment of data & development of POC/goals.      Time In: 0830       Time Out: 0845     Total treatment time: 0       Treatment Charges: Mins Units   OT Eval Low 60472 X    OT Eval Medium 78719     OT Eval High 14506     OT Re-Eval 96055     Ther Ex  64891       Manual Therapy [address] Activities [address]6548       ADL/Home Mgt 40996     Neuro Re-ed 46337       Group Therapy        Orthotic manage/training  97746       Non-Billable Time           Doradha Colvin, OTR/L [id number]

## 2021-10-14 LAB
BLOOD CULTURE, ROUTINE: NORMAL
CULTURE, BLOOD 2: NORMAL

## 2021-11-08 PROBLEM — R77.8 ELEVATED TROPONIN: Status: RESOLVED | Noted: 2021-10-09 | Resolved: 2021-11-08

## 2021-11-08 PROBLEM — R79.89 ELEVATED TROPONIN: Status: RESOLVED | Noted: 2021-10-09 | Resolved: 2021-11-08

## 2021-12-18 ENCOUNTER — APPOINTMENT (OUTPATIENT)
Dept: CT IMAGING | Age: 51
DRG: 100 | End: 2021-12-18
Payer: COMMERCIAL

## 2021-12-18 ENCOUNTER — APPOINTMENT (OUTPATIENT)
Dept: GENERAL RADIOLOGY | Age: 51
DRG: 100 | End: 2021-12-18
Payer: COMMERCIAL

## 2021-12-18 ENCOUNTER — HOSPITAL ENCOUNTER (INPATIENT)
Age: 51
LOS: 4 days | Discharge: HOME | DRG: 100 | End: 2021-12-22
Attending: FAMILY MEDICINE | Admitting: INTERNAL MEDICINE
Payer: COMMERCIAL

## 2021-12-18 ENCOUNTER — HOSPITAL ENCOUNTER (EMERGENCY)
Age: 51
Discharge: HOME | DRG: 100 | End: 2021-12-18
Attending: STUDENT IN AN ORGANIZED HEALTH CARE EDUCATION/TRAINING PROGRAM
Payer: COMMERCIAL

## 2021-12-18 VITALS
WEIGHT: 246 LBS | SYSTOLIC BLOOD PRESSURE: 126 MMHG | RESPIRATION RATE: 18 BRPM | HEART RATE: 88 BPM | HEIGHT: 75 IN | TEMPERATURE: 96.8 F | DIASTOLIC BLOOD PRESSURE: 82 MMHG | BODY MASS INDEX: 30.59 KG/M2 | OXYGEN SATURATION: 98 %

## 2021-12-18 DIAGNOSIS — G47.9 SLEEP DISTURBANCE: Primary | ICD-10-CM

## 2021-12-18 DIAGNOSIS — M25.512 ACUTE PAIN OF BOTH SHOULDERS: ICD-10-CM

## 2021-12-18 DIAGNOSIS — R56.9 WITNESSED SEIZURE-LIKE ACTIVITY (HCC): Primary | ICD-10-CM

## 2021-12-18 DIAGNOSIS — R09.02 HYPOXIA: ICD-10-CM

## 2021-12-18 DIAGNOSIS — M25.511 ACUTE PAIN OF BOTH SHOULDERS: ICD-10-CM

## 2021-12-18 PROBLEM — Z79.01 CHRONIC ANTICOAGULATION: Status: ACTIVE | Noted: 2021-12-18

## 2021-12-18 PROBLEM — E66.9 OBESITY (BMI 30-39.9): Status: ACTIVE | Noted: 2021-12-18

## 2021-12-18 PROBLEM — D68.9 CLOTTING DISORDER (HCC): Status: ACTIVE | Noted: 2021-12-18

## 2021-12-18 LAB
ALBUMIN SERPL-MCNC: 3.2 G/DL (ref 3.5–5.2)
ALP BLD-CCNC: 48 U/L (ref 40–129)
ALT SERPL-CCNC: 14 U/L (ref 0–40)
ANION GAP SERPL CALCULATED.3IONS-SCNC: 12 MMOL/L (ref 7–16)
ANION GAP SERPL CALCULATED.3IONS-SCNC: 9 MMOL/L (ref 7–16)
APTT: 67.7 SEC (ref 24.5–35.1)
AST SERPL-CCNC: 18 U/L (ref 0–39)
BASOPHILS ABSOLUTE: 0.03 E9/L (ref 0–0.2)
BASOPHILS ABSOLUTE: 0.04 E9/L (ref 0–0.2)
BASOPHILS RELATIVE PERCENT: 0.3 % (ref 0–2)
BASOPHILS RELATIVE PERCENT: 0.5 % (ref 0–2)
BILIRUB SERPL-MCNC: <0.2 MG/DL (ref 0–1.2)
BILIRUBIN URINE: NEGATIVE
BLOOD, URINE: NEGATIVE
BUN BLDV-MCNC: 11 MG/DL (ref 6–20)
BUN BLDV-MCNC: 14 MG/DL (ref 6–20)
CALCIUM SERPL-MCNC: 7.3 MG/DL (ref 8.6–10.2)
CALCIUM SERPL-MCNC: 9.1 MG/DL (ref 8.6–10.2)
CHLORIDE BLD-SCNC: 106 MMOL/L (ref 98–107)
CHLORIDE BLD-SCNC: 114 MMOL/L (ref 98–107)
CLARITY: CLEAR
CO2: 18 MMOL/L (ref 22–29)
CO2: 22 MMOL/L (ref 22–29)
COLOR: YELLOW
CREAT SERPL-MCNC: 0.8 MG/DL (ref 0.7–1.2)
CREAT SERPL-MCNC: 1.1 MG/DL (ref 0.7–1.2)
D DIMER: 2928 NG/ML DDU
EOSINOPHILS ABSOLUTE: 0.03 E9/L (ref 0.05–0.5)
EOSINOPHILS ABSOLUTE: 0.13 E9/L (ref 0.05–0.5)
EOSINOPHILS RELATIVE PERCENT: 0.3 % (ref 0–6)
EOSINOPHILS RELATIVE PERCENT: 1.7 % (ref 0–6)
GFR AFRICAN AMERICAN: >60
GFR AFRICAN AMERICAN: >60
GFR NON-AFRICAN AMERICAN: >60 ML/MIN/1.73
GFR NON-AFRICAN AMERICAN: >60 ML/MIN/1.73
GLUCOSE BLD-MCNC: 106 MG/DL (ref 74–99)
GLUCOSE BLD-MCNC: 121 MG/DL (ref 74–99)
GLUCOSE URINE: NEGATIVE MG/DL
HCT VFR BLD CALC: 38.5 % (ref 37–54)
HCT VFR BLD CALC: 39.4 % (ref 37–54)
HEMOGLOBIN: 11.8 G/DL (ref 12.5–16.5)
HEMOGLOBIN: 12.6 G/DL (ref 12.5–16.5)
IMMATURE GRANULOCYTES #: 0.05 E9/L
IMMATURE GRANULOCYTES #: 0.05 E9/L
IMMATURE GRANULOCYTES %: 0.5 % (ref 0–5)
IMMATURE GRANULOCYTES %: 0.6 % (ref 0–5)
INR BLD: 2.1
INR BLD: 2.4
KETONES, URINE: NEGATIVE MG/DL
LEUKOCYTE ESTERASE, URINE: NEGATIVE
LYMPHOCYTES ABSOLUTE: 0.9 E9/L (ref 1.5–4)
LYMPHOCYTES ABSOLUTE: 0.96 E9/L (ref 1.5–4)
LYMPHOCYTES RELATIVE PERCENT: 12.3 % (ref 20–42)
LYMPHOCYTES RELATIVE PERCENT: 8.4 % (ref 20–42)
MAGNESIUM: 1.8 MG/DL (ref 1.6–2.6)
MCH RBC QN AUTO: 23 PG (ref 26–35)
MCH RBC QN AUTO: 23.6 PG (ref 26–35)
MCHC RBC AUTO-ENTMCNC: 30.6 % (ref 32–34.5)
MCHC RBC AUTO-ENTMCNC: 32 % (ref 32–34.5)
MCV RBC AUTO: 73.6 FL (ref 80–99.9)
MCV RBC AUTO: 75.2 FL (ref 80–99.9)
MONOCYTES ABSOLUTE: 0.52 E9/L (ref 0.1–0.95)
MONOCYTES ABSOLUTE: 0.59 E9/L (ref 0.1–0.95)
MONOCYTES RELATIVE PERCENT: 5.5 % (ref 2–12)
MONOCYTES RELATIVE PERCENT: 6.7 % (ref 2–12)
NEUTROPHILS ABSOLUTE: 6.11 E9/L (ref 1.8–7.3)
NEUTROPHILS ABSOLUTE: 9.1 E9/L (ref 1.8–7.3)
NEUTROPHILS RELATIVE PERCENT: 78.2 % (ref 43–80)
NEUTROPHILS RELATIVE PERCENT: 85 % (ref 43–80)
NITRITE, URINE: NEGATIVE
PDW BLD-RTO: 16.7 FL (ref 11.5–15)
PDW BLD-RTO: 16.8 FL (ref 11.5–15)
PH UA: 6 (ref 5–9)
PLATELET # BLD: 207 E9/L (ref 130–450)
PLATELET # BLD: 209 E9/L (ref 130–450)
PMV BLD AUTO: 10.2 FL (ref 7–12)
PMV BLD AUTO: 9.8 FL (ref 7–12)
POC BASE EXCESS: -1.9 MMOL/L (ref -3–3)
POC CPB: NO
POC DEVICE ID: ABNORMAL
POC HCO3: 21.9 MMOL/L (ref 22–26)
POC O2 SATURATION: 87.7 % (ref 92–98.5)
POC OPERATOR ID: ABNORMAL
POC PCO2: 33.5 MMHG (ref 35–45)
POC PH: 7.42 (ref 7.35–7.45)
POC PO2: 52.3 MMHG (ref 80–100)
POC SAMPLE TYPE: ABNORMAL
POTASSIUM SERPL-SCNC: 3.7 MMOL/L (ref 3.5–5)
POTASSIUM SERPL-SCNC: 4.1 MMOL/L (ref 3.5–5)
PRO-BNP: 165 PG/ML (ref 0–125)
PROCALCITONIN: 0.07 NG/ML (ref 0–0.08)
PROTEIN UA: NEGATIVE MG/DL
PROTHROMBIN TIME: 23 SEC (ref 9.3–12.4)
PROTHROMBIN TIME: 26.1 SEC (ref 9.3–12.4)
RBC # BLD: 5.12 E12/L (ref 3.8–5.8)
RBC # BLD: 5.35 E12/L (ref 3.8–5.8)
SARS-COV-2, NAAT: NOT DETECTED
SODIUM BLD-SCNC: 140 MMOL/L (ref 132–146)
SODIUM BLD-SCNC: 141 MMOL/L (ref 132–146)
SPECIFIC GRAVITY UA: 1.01 (ref 1–1.03)
TOTAL PROTEIN: 5.7 G/DL (ref 6.4–8.3)
TROPONIN, HIGH SENSITIVITY: 11 NG/L (ref 0–11)
TROPONIN, HIGH SENSITIVITY: 11 NG/L (ref 0–11)
UROBILINOGEN, URINE: 0.2 E.U./DL
WBC # BLD: 10.7 E9/L (ref 4.5–11.5)
WBC # BLD: 7.8 E9/L (ref 4.5–11.5)

## 2021-12-18 PROCEDURE — 6370000000 HC RX 637 (ALT 250 FOR IP): Performed by: NURSE PRACTITIONER

## 2021-12-18 PROCEDURE — 83880 ASSAY OF NATRIURETIC PEPTIDE: CPT

## 2021-12-18 PROCEDURE — 87635 SARS-COV-2 COVID-19 AMP PRB: CPT

## 2021-12-18 PROCEDURE — 85610 PROTHROMBIN TIME: CPT

## 2021-12-18 PROCEDURE — 36415 COLL VENOUS BLD VENIPUNCTURE: CPT

## 2021-12-18 PROCEDURE — 85025 COMPLETE CBC W/AUTO DIFF WBC: CPT

## 2021-12-18 PROCEDURE — 6370000000 HC RX 637 (ALT 250 FOR IP): Performed by: EMERGENCY MEDICINE

## 2021-12-18 PROCEDURE — 6370000000 HC RX 637 (ALT 250 FOR IP): Performed by: STUDENT IN AN ORGANIZED HEALTH CARE EDUCATION/TRAINING PROGRAM

## 2021-12-18 PROCEDURE — 6360000004 HC RX CONTRAST MEDICATION: Performed by: RADIOLOGY

## 2021-12-18 PROCEDURE — 82803 BLOOD GASES ANY COMBINATION: CPT

## 2021-12-18 PROCEDURE — 93005 ELECTROCARDIOGRAM TRACING: CPT | Performed by: STUDENT IN AN ORGANIZED HEALTH CARE EDUCATION/TRAINING PROGRAM

## 2021-12-18 PROCEDURE — 80053 COMPREHEN METABOLIC PANEL: CPT

## 2021-12-18 PROCEDURE — 80048 BASIC METABOLIC PNL TOTAL CA: CPT

## 2021-12-18 PROCEDURE — 81003 URINALYSIS AUTO W/O SCOPE: CPT

## 2021-12-18 PROCEDURE — 85378 FIBRIN DEGRADE SEMIQUANT: CPT

## 2021-12-18 PROCEDURE — 84484 ASSAY OF TROPONIN QUANT: CPT

## 2021-12-18 PROCEDURE — 6370000000 HC RX 637 (ALT 250 FOR IP): Performed by: FAMILY MEDICINE

## 2021-12-18 PROCEDURE — 84145 PROCALCITONIN (PCT): CPT

## 2021-12-18 PROCEDURE — 83735 ASSAY OF MAGNESIUM: CPT

## 2021-12-18 PROCEDURE — 99284 EMERGENCY DEPT VISIT MOD MDM: CPT

## 2021-12-18 PROCEDURE — 2580000003 HC RX 258: Performed by: STUDENT IN AN ORGANIZED HEALTH CARE EDUCATION/TRAINING PROGRAM

## 2021-12-18 PROCEDURE — 93005 ELECTROCARDIOGRAM TRACING: CPT | Performed by: FAMILY MEDICINE

## 2021-12-18 PROCEDURE — 2060000000 HC ICU INTERMEDIATE R&B

## 2021-12-18 PROCEDURE — 85730 THROMBOPLASTIN TIME PARTIAL: CPT

## 2021-12-18 PROCEDURE — 70450 CT HEAD/BRAIN W/O DYE: CPT

## 2021-12-18 PROCEDURE — 71275 CT ANGIOGRAPHY CHEST: CPT

## 2021-12-18 PROCEDURE — 2580000003 HC RX 258: Performed by: NURSE PRACTITIONER

## 2021-12-18 PROCEDURE — 71045 X-RAY EXAM CHEST 1 VIEW: CPT

## 2021-12-18 PROCEDURE — 99285 EMERGENCY DEPT VISIT HI MDM: CPT

## 2021-12-18 RX ORDER — ONDANSETRON 4 MG/1
4 TABLET, ORALLY DISINTEGRATING ORAL EVERY 8 HOURS PRN
Status: DISCONTINUED | OUTPATIENT
Start: 2021-12-18 | End: 2021-12-22 | Stop reason: HOSPADM

## 2021-12-18 RX ORDER — OXYCODONE HYDROCHLORIDE AND ACETAMINOPHEN 5; 325 MG/1; MG/1
1 TABLET ORAL ONCE
Status: COMPLETED | OUTPATIENT
Start: 2021-12-18 | End: 2021-12-18

## 2021-12-18 RX ORDER — WARFARIN SODIUM 7.5 MG/1
7.5 TABLET ORAL
COMMUNITY

## 2021-12-18 RX ORDER — ACETAMINOPHEN 325 MG/1
650 TABLET ORAL ONCE
Status: COMPLETED | OUTPATIENT
Start: 2021-12-18 | End: 2021-12-18

## 2021-12-18 RX ORDER — SODIUM CHLORIDE 0.9 % (FLUSH) 0.9 %
5-40 SYRINGE (ML) INJECTION EVERY 12 HOURS SCHEDULED
Status: DISCONTINUED | OUTPATIENT
Start: 2021-12-18 | End: 2021-12-22 | Stop reason: HOSPADM

## 2021-12-18 RX ORDER — POLYETHYLENE GLYCOL 3350 17 G/17G
17 POWDER, FOR SOLUTION ORAL DAILY PRN
Status: DISCONTINUED | OUTPATIENT
Start: 2021-12-18 | End: 2021-12-22 | Stop reason: HOSPADM

## 2021-12-18 RX ORDER — DILTIAZEM HYDROCHLORIDE 120 MG/1
120 CAPSULE, COATED, EXTENDED RELEASE ORAL DAILY
Status: DISCONTINUED | OUTPATIENT
Start: 2021-12-18 | End: 2021-12-18

## 2021-12-18 RX ORDER — METOPROLOL SUCCINATE 50 MG/1
50 TABLET, EXTENDED RELEASE ORAL DAILY
Status: DISCONTINUED | OUTPATIENT
Start: 2021-12-18 | End: 2021-12-18

## 2021-12-18 RX ORDER — IBUPROFEN 400 MG/1
400 TABLET ORAL ONCE
Status: COMPLETED | OUTPATIENT
Start: 2021-12-18 | End: 2021-12-18

## 2021-12-18 RX ORDER — IPRATROPIUM BROMIDE AND ALBUTEROL SULFATE 2.5; .5 MG/3ML; MG/3ML
1 SOLUTION RESPIRATORY (INHALATION) EVERY 6 HOURS PRN
Status: DISCONTINUED | OUTPATIENT
Start: 2021-12-18 | End: 2021-12-22 | Stop reason: HOSPADM

## 2021-12-18 RX ORDER — WARFARIN SODIUM 5 MG/1
5 TABLET ORAL DAILY
Status: DISCONTINUED | OUTPATIENT
Start: 2021-12-18 | End: 2021-12-18

## 2021-12-18 RX ORDER — DILTIAZEM HYDROCHLORIDE 120 MG/1
120 CAPSULE, COATED, EXTENDED RELEASE ORAL NIGHTLY
Status: DISCONTINUED | OUTPATIENT
Start: 2021-12-18 | End: 2021-12-22 | Stop reason: HOSPADM

## 2021-12-18 RX ORDER — ONDANSETRON 2 MG/ML
4 INJECTION INTRAMUSCULAR; INTRAVENOUS EVERY 6 HOURS PRN
Status: DISCONTINUED | OUTPATIENT
Start: 2021-12-18 | End: 2021-12-22 | Stop reason: HOSPADM

## 2021-12-18 RX ORDER — SODIUM CHLORIDE 0.9 % (FLUSH) 0.9 %
5-40 SYRINGE (ML) INJECTION PRN
Status: DISCONTINUED | OUTPATIENT
Start: 2021-12-18 | End: 2021-12-22 | Stop reason: HOSPADM

## 2021-12-18 RX ORDER — ACETAMINOPHEN 325 MG/1
650 TABLET ORAL EVERY 6 HOURS PRN
Status: DISCONTINUED | OUTPATIENT
Start: 2021-12-18 | End: 2021-12-22 | Stop reason: HOSPADM

## 2021-12-18 RX ORDER — ACETAMINOPHEN 650 MG/1
650 SUPPOSITORY RECTAL EVERY 6 HOURS PRN
Status: DISCONTINUED | OUTPATIENT
Start: 2021-12-18 | End: 2021-12-22 | Stop reason: HOSPADM

## 2021-12-18 RX ORDER — WARFARIN SODIUM 5 MG/1
5 TABLET ORAL
Status: DISCONTINUED | OUTPATIENT
Start: 2021-12-18 | End: 2021-12-22 | Stop reason: HOSPADM

## 2021-12-18 RX ORDER — HYDROCODONE BITARTRATE AND ACETAMINOPHEN 5; 325 MG/1; MG/1
1 TABLET ORAL ONCE
Status: COMPLETED | OUTPATIENT
Start: 2021-12-18 | End: 2021-12-18

## 2021-12-18 RX ORDER — WARFARIN SODIUM 7.5 MG/1
7.5 TABLET ORAL
Status: DISCONTINUED | OUTPATIENT
Start: 2021-12-19 | End: 2021-12-22 | Stop reason: HOSPADM

## 2021-12-18 RX ORDER — 0.9 % SODIUM CHLORIDE 0.9 %
1000 INTRAVENOUS SOLUTION INTRAVENOUS ONCE
Status: COMPLETED | OUTPATIENT
Start: 2021-12-18 | End: 2021-12-18

## 2021-12-18 RX ORDER — SODIUM CHLORIDE 9 MG/ML
25 INJECTION, SOLUTION INTRAVENOUS PRN
Status: DISCONTINUED | OUTPATIENT
Start: 2021-12-18 | End: 2021-12-22 | Stop reason: HOSPADM

## 2021-12-18 RX ORDER — METOPROLOL SUCCINATE 50 MG/1
50 TABLET, EXTENDED RELEASE ORAL DAILY
Status: DISCONTINUED | OUTPATIENT
Start: 2021-12-19 | End: 2021-12-22 | Stop reason: HOSPADM

## 2021-12-18 RX ORDER — DILTIAZEM HYDROCHLORIDE 120 MG/1
120 CAPSULE, COATED, EXTENDED RELEASE ORAL NIGHTLY
Status: DISCONTINUED | OUTPATIENT
Start: 2021-12-19 | End: 2021-12-18

## 2021-12-18 RX ORDER — FLUTICASONE PROPIONATE 50 MCG
2 SPRAY, SUSPENSION (ML) NASAL 2 TIMES DAILY
Status: DISCONTINUED | OUTPATIENT
Start: 2021-12-18 | End: 2021-12-22 | Stop reason: HOSPADM

## 2021-12-18 RX ADMIN — Medication 10 ML: at 21:54

## 2021-12-18 RX ADMIN — ACETAMINOPHEN 650 MG: 325 TABLET ORAL at 09:44

## 2021-12-18 RX ADMIN — OXYCODONE AND ACETAMINOPHEN 1 TABLET: 5; 325 TABLET ORAL at 14:02

## 2021-12-18 RX ADMIN — HYDROCODONE BITARTRATE AND ACETAMINOPHEN 1 TABLET: 5; 325 TABLET ORAL at 19:33

## 2021-12-18 RX ADMIN — SODIUM CHLORIDE 1000 ML: 9 INJECTION, SOLUTION INTRAVENOUS at 06:58

## 2021-12-18 RX ADMIN — IOPAMIDOL 75 ML: 755 INJECTION, SOLUTION INTRAVENOUS at 16:29

## 2021-12-18 RX ADMIN — IBUPROFEN 400 MG: 400 TABLET, FILM COATED ORAL at 21:51

## 2021-12-18 ASSESSMENT — PAIN SCALES - GENERAL
PAINLEVEL_OUTOF10: 10
PAINLEVEL_OUTOF10: 10
PAINLEVEL_OUTOF10: 5
PAINLEVEL_OUTOF10: 10

## 2021-12-18 ASSESSMENT — ENCOUNTER SYMPTOMS
COUGH: 0
VOMITING: 0
BACK PAIN: 0
ABDOMINAL PAIN: 0
CONSTIPATION: 0
DIARRHEA: 0
NAUSEA: 0
COLOR CHANGE: 0
WHEEZING: 0
STRIDOR: 0
SHORTNESS OF BREATH: 0
ABDOMINAL DISTENTION: 0

## 2021-12-18 ASSESSMENT — PAIN DESCRIPTION - LOCATION
LOCATION: SHOULDER

## 2021-12-18 ASSESSMENT — PAIN DESCRIPTION - PROGRESSION: CLINICAL_PROGRESSION: NOT CHANGED

## 2021-12-18 ASSESSMENT — PAIN DESCRIPTION - ORIENTATION
ORIENTATION: LEFT;RIGHT
ORIENTATION: RIGHT;LEFT
ORIENTATION: RIGHT;LEFT

## 2021-12-18 ASSESSMENT — PAIN DESCRIPTION - DESCRIPTORS
DESCRIPTORS: SORE
DESCRIPTORS: ACHING
DESCRIPTORS: ACHING

## 2021-12-18 ASSESSMENT — PAIN DESCRIPTION - FREQUENCY
FREQUENCY: CONTINUOUS

## 2021-12-18 ASSESSMENT — PAIN DESCRIPTION - PAIN TYPE
TYPE: ACUTE PAIN
TYPE: ACUTE PAIN

## 2021-12-18 ASSESSMENT — PAIN DESCRIPTION - ONSET: ONSET: SUDDEN

## 2021-12-18 NOTE — ED PROVIDER NOTES
HPI:  12/18/21, Time: 1:46 PM NEFTALY Ackerman is a 46 y.o. male presenting to the ED for seizure-like activity at onset of sleep, beginning 1 day ago and similar episode 2 months ago. The complaint has been intermittent, moderate in severity, and worsened by nothing. Patient was seen in ED this morning work-up essentially negative return home had a similar episode lasting for about a minute patient has no recollection of event this episode there was no biting lip but there was stiffness and thrashing about patient is complaining of shoulder pain worse on the right there is been no shortness of breath there is been no vomiting no incontinence there is no slurred speech no post ictal period. ROS:   Pertinent positives and negatives are stated within HPI, all other systems reviewed and are negative.  --------------------------------------------- PAST HISTORY ---------------------------------------------  Past Medical History:  has a past medical history of DVT (deep venous thrombosis) ([facility]) and Lupus anticoagulant disorder ([facility] [address] Past Surgical History:  has a past surgical history that includes hernia repair; Cholecystectomy; and Aortic valve replacement (01/2019). Social History:  reports that he has never smoked. He has never used smokeless tobacco. He reports previous alcohol use. He reports that he does not use drugs. Family History: family history is not on file. The patients home medications have been reviewed. Allergies: Patient has no known allergies.     ---------------------------------------------------PHYSICAL EXAM--------------------------------------    Constitutional/General: Alert and oriented x3, well appearing, non toxic in NAD  Head: Normocephalic and atraumatic  Eyes: PERRL, EOMI  Mouth: Oropharynx clear, handling secretions, no trismus  Neck: Supple, full ROM, non tender to palpation in the midline, no stridor, no crepitus, no meningeal signs  Pulmonary: Lungs clear to auscultation bilaterally, no wheezes, rales, or rhonchi. Not in respiratory distress  Cardiovascular:  Regular rate. Regular rhythm. No murmurs, gallops, or rubs. 2+ distal pulses  Chest: no chest wall tenderness  Abdomen: Soft. Non tender. Non distended. +BS. No rebound, guarding, or rigidity. No pulsatile masses appreciated. Musculoskeletal: Bilateral shoulder tenderness moves all extremities x 4. Warm and well perfused, no clubbing, cyanosis, or edema. Capillary refill <3 seconds  Skin: warm and dry. No rashes. Neurologic: GCS 15, CN 2-12 grossly intact, no focal deficits,  symmetric strength 5/5 in the upper and lower extremities bilaterally hyperreflexia no clonus. Psych: Normal Affect    -------------------------------------------------- RESULTS -------------------------------------------------  I have personally reviewed all laboratory and imaging results for this patient. Results are listed below.      LABS:  Results for orders placed or performed during the hospital encounter of 12/18/21   COVID-19, Rapid    Specimen: Nasopharyngeal Swab   Result Value Ref Range    SARS-CoV-2, NAAT Not Detected Not Detected   D-Dimer, Quantitative   Result Value Ref Range    D-Dimer, Quant 2928 ng/mL DDU   Protime-INR   Result Value Ref Range    Protime 26.1 (H) 9.3 - 12.4 sec    INR 2.4    CBC Auto Differential   Result Value Ref Range    WBC 10.7 4.5 - 11.5 E9/L    RBC 5.35 3.80 - 5.80 E12/L    Hemoglobin 12.6 12.5 - 16.5 g/dL    Hematocrit 39.4 37.0 - 54.0 %    MCV 73.6 (L) 80.0 - 99.9 fL    MCH 23.6 (L) 26.0 - 35.0 pg    MCHC 32.0 32.0 - 34.5 %    RDW 16.8 (H) 11.5 - 15.0 fL    Platelets 376 143 - 623 E9/L    MPV 9.8 7.0 - 12.0 fL    Neutrophils % 85.0 (H) 43.0 - 80.0 %    Immature Granulocytes % 0.5 0.0 - 5.0 %    Lymphocytes % 8.4 (L) 20.0 - 42.0 %    Monocytes % 5.5 2.0 - 12.0 %    Eosinophils % 0.3 0.0 - 6.0 %    Basophils % 0.3 0.0 - 2.0 %    Neutrophils Absolute 9.10 (H) 1.80 - 7.30 E9/L Immature Granulocytes # 0.05 E9/L    Lymphocytes Absolute 0.90 (L) 1.50 - 4.00 E9/L    Monocytes Absolute 0.59 0.10 - 0.95 E9/L    Eosinophils Absolute 0.03 (L) 0.05 - 0.50 E9/L    Basophils Absolute 0.03 0.00 - 0.20 R9/A   Basic Metabolic Panel   Result Value Ref Range    Sodium 140 132 - 146 mmol/L    Potassium 3.7 3.5 - 5.0 mmol/L    Chloride 106 98 - 107 mmol/L    CO2 22 22 - 29 mmol/L    Anion Gap 12 7 - 16 mmol/L    Glucose 121 (H) 74 - 99 mg/dL    BUN 14 6 - 20 mg/dL    CREATININE 1.1 0.7 - 1.2 mg/dL    GFR Non-African American >60 >=60 mL/min/1.73    GFR African American >60     Calcium 9.1 8.6 - 10.2 mg/dL   Procalcitonin   Result Value Ref Range    Procalcitonin 0.07 0.00 - 0.08 ng/mL   POCT blood gases   Result Value Ref Range    Sample Type Arterial     POC pH 7.423 7.350 - 7.450    POC pCO2 33.5 (L) 35.0 - 45.0 mmHg    POC PO2 52.3 (L) 80.0 - 100.0 mmHg    POC HCO3 21.9 (L) 22.0 - 26.0 mmol/L    POC Base Excess -1.9 -3.0 - 3.0 mmol/L    POC O2 SAT 87.7 (L) 92.0 - 98.5 %    POC CPB No     POC  ,413     POC Device ID [id number]        RADIOLOGY:  Interpreted by Radiologist.  CTA PULMONARY W CONTRAST   Final Result   1. No acute pulmonary embolus is identified within limitations   2. Pulmonary aeration is greatly improved   3. Redemonstrated pancreatic body lesion, recommend dedicated MRI or CT   evaluation   4. Developed comminuted medial humeral head fractures         XR SHOULDER RIGHT (MIN 2 VIEWS)    (Results Pending)   XR SHOULDER LEFT (MIN 2 VIEWS)    (Results Pending)         ------------------------- NURSING NOTES AND VITALS REVIEWED ---------------------------   The nursing notes within the ED encounter and vital signs as below have been reviewed by myself.   /86   Pulse 66   Temp 96.7 °F (35.9 °C) (Temporal)   Resp 22   Ht 6' 3\" (1.905 m)   Wt 247 lb (112 kg)   SpO2 93%   BMI 30.87 kg/m²   Oxygen Saturation Interpretation: Normal    The patients available past medical records and past encounters were reviewed.         ------------------------------ ED COURSE/MEDICAL DECISION MAKING----------------------  Medications   sodium chloride flush 0.9 % injection 5-40 mL (10 mLs IntraVENous Given 12/18/21 2154)   sodium chloride flush 0.9 % injection 5-40 mL (has no administration in time range)   0.9 % sodium chloride infusion (has no administration in time range)   ondansetron (ZOFRAN-ODT) disintegrating tablet 4 mg (has no administration in time range)     Or   ondansetron (ZOFRAN) injection 4 mg (has no administration in time range)   polyethylene glycol (GLYCOLAX) packet 17 g (has no administration in time range)   acetaminophen (TYLENOL) tablet 650 mg (has no administration in time range)     Or   acetaminophen (TYLENOL) suppository 650 mg (has no administration in time range)   ipratropium-albuterol (DUONEB) nebulizer solution 1 ampule (has no administration in time range)   fluticasone (FLONASE) 50 MCG/ACT nasal spray 2 spray (2 sprays Each Nostril Not Given 12/19/21 0049)   dilTIAZem (CARDIZEM CD) extended release capsule 120 mg (120 mg Oral Given 12/19/21 0049)   metoprolol succinate (TOPROL XL) extended release tablet 50 mg (has no administration in time range)   warfarin (COUMADIN) tablet 5 mg (5 mg Oral Given 12/19/21 0049)   warfarin (COUMADIN) tablet 7.5 mg (has no administration in time range)   cefTRIAXone (ROCEPHIN) 1,000 mg in sterile water 10 mL IV syringe (1,000 mg IntraVENous New Bag 12/19/21 9434)   doxycycline (VIBRAMYCIN) 100 mg in dextrose 5 % 100 mL IVPB (has no administration in time range)   oxyCODONE-acetaminophen (PERCOCET) 5-325 MG per tablet 1 tablet (1 tablet Oral Given 12/18/21 1402)   iopamidol (ISOVUE-370) 76 % injection 75 mL (75 mLs IntraVENous Given 12/18/21 1629)   HYDROcodone-acetaminophen (NORCO) 5-325 MG per tablet 1 tablet (1 tablet Oral Given 12/18/21 1933)   ibuprofen (ADVIL;MOTRIN) tablet 400 mg (400 mg Oral Given 12/18/21 2151) Medical Decision Making:    CTA is pending but will admit with seizure-like activities hypoxia shoulder pain without known trauma    Re-Evaluations:             Re-evaluation. Patients symptoms are improving      Consultations:             Neurology Dr Corinne Mcmanus discussed with Spenser    Critical Care: 35 min        This patient's ED course included: re-evaluation prior to disposition, multiple bedside re-evaluations, IV medications, cardiac monitoring, continuous pulse oximetry, complex medical decision making and emergency management and a personal history and physicial eaxmination    This patient has remained hemodynamically stable and improved during their ED course. Counseling: The emergency provider has spoken with the patient and spouse/SO and discussed todays results, in addition to providing specific details for the plan of care and counseling regarding the diagnosis and prognosis. Questions are answered at this time and they are agreeable with the plan.       --------------------------------- IMPRESSION AND DISPOSITION ---------------------------------    IMPRESSION  1. Witnessed seizure-like activity ([address]    2. Hypoxia    3. Acute pain of both shoulders        DISPOSITION  Disposition: Admit to telemetry patient condition is fair        NOTE: This report was transcribed using voice recognition software.  Every effort was made to ensure accuracy; however, inadvertent computerized transcription errors may be present          Shiela Banerjee MD  12/19/21 8558

## 2021-12-18 NOTE — ED PROVIDER NOTES
[facility] ENCOUNTER      Pt Name: Anna Sanches  MRN: [id number]  [place] 1970  Date of evaluation: 12/18/2021      CHIEF COMPLAINT       Chief Complaint   Patient presents with    Altered Mental Status     Wife woke to find \"not breathing, pale and gray. \"  Per EMS pt then wandering around house, abnormal gait with repetitive questioning. LKW midnight. Pt states \"I can't evern remember yesterday as if it didn't happen\"  Pt having difficult time completing tasks when asked by EMS        HPI  Anna Sanches is a 46 y.o. male history of aortic valve replacement 2018, A. fib, on warfarin and Cardizem at home presents with altered mental status. Patient's wife states that he was having moments where he was trying to get up and was confused this morning. No alleviating or exacerbating factors. States that no seizure-like episode. States that it happened once before 2 months prior. Denies any recent fevers, chills, nausea, vomiting, chest pain, shortness of breath, dumping implant pain, dysuria, hematuria, diarrhea, new rashes or sores. Except as noted above the remainder of the review of systems was reviewed and negative. Review of Systems   Constitutional: Negative for activity change, appetite change, diaphoresis, fatigue, fever and unexpected weight change. HENT: Negative for congestion. Eyes: Negative for visual disturbance. Respiratory: Negative for cough, shortness of breath, wheezing and stridor. Cardiovascular: Negative for chest pain, palpitations and leg swelling. Gastrointestinal: Negative for abdominal distention, abdominal pain, constipation, diarrhea, nausea and vomiting. Endocrine: Negative for polyphagia and polyuria. Genitourinary: Negative for dysuria and hematuria. Musculoskeletal: Negative for back pain. Skin: Negative for color change, pallor, rash and wound. Neurological: Negative for dizziness and syncope. Hematological: Negative for adenopathy. Does not bruise/bleed easily. Psychiatric/Behavioral: Negative for agitation. Physical Exam  Vitals and nursing note reviewed. Constitutional:       General: He is not in acute distress. Appearance: Normal appearance. He is not ill-appearing or diaphoretic. HENT:      Head: Normocephalic and atraumatic. Nose: Nose normal.      Mouth/Throat:      Mouth: Mucous membranes are moist.   Eyes:      Extraocular Movements: Extraocular movements intact. Pupils: Pupils are equal, round, and reactive to light. Cardiovascular:      Rate and Rhythm: Normal rate and regular rhythm. Pulses: Normal pulses. Heart sounds: Normal heart sounds. Pulmonary:      Effort: Pulmonary effort is normal.      Breath sounds: Normal breath sounds. Abdominal:      General: Bowel sounds are normal.      Palpations: Abdomen is soft. Tenderness: There is no abdominal tenderness. There is no right CVA tenderness, left CVA tenderness or rebound. Negative signs include Hernández's sign, Rovsing's sign and McBurney's sign. Musculoskeletal:         General: Normal range of motion. Cervical back: Normal range of motion. Skin:     General: Skin is warm and dry. Capillary Refill: Capillary refill takes less than 2 seconds. Neurological:      General: No focal deficit present. Mental Status: He is alert and oriented to person, place, and time. GCS: GCS eye subscore is 4. GCS verbal subscore is 5. GCS motor subscore is 6. Cranial Nerves: No cranial nerve deficit. Deep Tendon Reflexes: Reflexes normal.   Psychiatric:         Mood and Affect: Mood normal.        NIH Stroke Scale/Score at time of initial evaluation: 0620  1A: Level of Consciousness 0 - alert; keenly responsive   1B: Ask Month and Age 0 - answers both questions correctly   1C:  Tell Patient To Open and Close Eyes, then Hand  Squeeze 0 - performs both tasks correctly   2: Test Horizontal Extraocular Movements 0 - normal   3: Test Visual Fields 0 - no visual loss   4: Test Facial Palsy 0 - normal symmetric movement   5A: Test Left Arm Motor Drift 0 - no drift, limb holds 90 (or 45) degrees for full 10 seconds   5B: Test Right Arm Motor Drift 0 - no drift, limb holds 90 (or 45) degrees for full 10 seconds   6A: Test Left Leg Motor Drift 0 - no drift; leg holds 30 degree position for full 5 seconds   6B: Test Right Leg Motor Drift 0 - no drift; leg holds 30 degree position for full 5 seconds   7: Test Limb Ataxia   (FNF/Heel-Shin) 0 - absent   8: Test Sensation 0 - normal; no sensory loss   9: Test Language/Aphasia 0 - no aphasia, normal   10: Test Dysarthria 0 - normal   11: Test Extinction/Inattention 0 - no abnormality   Total 0       Procedures     MDM  Number of Diagnoses or Management Options  Sleep disturbance  Diagnosis management comments: 78-year-old male history of aortic valve replacement on Coumadin presents with altered mental status per patient and wife. Per patient's wife he was altered this morning. No seizure-like episode. Vitals within normal limits. On physical exam patient is no acute distress, speaking full sentences, alert oriented x3. Cranial nerves II through XII grossly intact. NIH stroke scale 0. Diagnostic labs and imaging interpreted and reviewed. CT head negative for any acute process electrolytes within normal limits. EKG shows no signs of ischemia. Urinalysis negative for infection. Patient is medically clear. Suspected sleep disorder for patient as he states that he usually sleeps on his side and then when he was sleeping on his back felt like he could not catch his breath and woke up confused. The second time will happen within the last 2 months. Patient was given follow-up with neurology as well as pulmonology.   Patient is not altered and wife is at bedside states that he is at his baseline mentation. Amount and/or Complexity of Data Reviewed  Decide to obtain previous medical records or to obtain history from someone other than the patient: yes         ED Course as of 12/18/21 0959   Sat Dec 18, 2021   0643 EKG read interpreted me. Heart rate 66. Normal sinus rhythm. Normal axis deviation. QTc 421. No ST elevations or depressions. Stable as compared to previous EKG. [JV]   9994 Spoke with patient's wife Gopi Lin who says the patient was at baseline prior to leaving by EMS. [JV]      ED Course User Index  [JV] Maurilio Lipscomb MD       --------------------------------------------- PAST HISTORY ---------------------------------------------  Past Medical History:  has a past medical history of DVT (deep venous thrombosis) ([facility] [address] and Lupus anticoagulant disorder ([facility] [address] Past Surgical History:  has a past surgical history that includes hernia repair; Cholecystectomy; and Aortic valve replacement (01/2019). Social History:  reports that he has never smoked. He has never used smokeless tobacco. He reports previous alcohol use. He reports that he does not use drugs. Family History: family history is not on file. The patients home medications have been reviewed. Allergies: Patient has no known allergies.     -------------------------------------------------- RESULTS -------------------------------------------------  Labs:  Results for orders placed or performed during the hospital encounter of 12/18/21   CBC Auto Differential   Result Value Ref Range    WBC 7.8 4.5 - 11.5 E9/L    RBC 5.12 3.80 - 5.80 E12/L    Hemoglobin 11.8 (L) 12.5 - 16.5 g/dL    Hematocrit 38.5 37.0 - 54.0 %    MCV 75.2 (L) 80.0 - 99.9 fL    MCH 23.0 (L) 26.0 - 35.0 pg    MCHC 30.6 (L) 32.0 - 34.5 %    RDW 16.7 (H) 11.5 - 15.0 fL    Platelets 199 184 - 653 E9/L    MPV 10.2 7.0 - 12.0 fL    Neutrophils % 78.2 43.0 - 80.0 %    Immature Granulocytes % 0.6 0.0 - 5.0 %    Lymphocytes % 12.3 (L) 20.0 - 42.0 %    Monocytes % 6.7 2.0 - 12.0 %    Eosinophils % 1.7 0.0 - 6.0 %    Basophils % 0.5 0.0 - 2.0 %    Neutrophils Absolute 6.11 1.80 - 7.30 E9/L    Immature Granulocytes # 0.05 E9/L    Lymphocytes Absolute 0.96 (L) 1.50 - 4.00 E9/L    Monocytes Absolute 0.52 0.10 - 0.95 E9/L    Eosinophils Absolute 0.13 0.05 - 0.50 E9/L    Basophils Absolute 0.04 0.00 - 0.20 E9/L   Protime-INR   Result Value Ref Range    Protime 23.0 (H) 9.3 - 12.4 sec    INR 2.1    Comprehensive Metabolic Panel   Result Value Ref Range    Sodium 141 132 - 146 mmol/L    Potassium 4.1 3.5 - 5.0 mmol/L    Chloride 114 (H) 98 - 107 mmol/L    CO2 18 (L) 22 - 29 mmol/L    Anion Gap 9 7 - 16 mmol/L    Glucose 106 (H) 74 - 99 mg/dL    BUN 11 6 - 20 mg/dL    CREATININE 0.8 0.7 - 1.2 mg/dL    GFR Non-African American >60 >=60 mL/min/1.73    GFR African American >60     Calcium 7.3 (L) 8.6 - 10.2 mg/dL    Total Protein 5.7 (L) 6.4 - 8.3 g/dL    Albumin 3.2 (L) 3.5 - 5.2 g/dL    Total Bilirubin <0.2 0.0 - 1.2 mg/dL    Alkaline Phosphatase 48 40 - 129 U/L    ALT 14 0 - 40 U/L    AST 18 0 - 39 U/L   Magnesium   Result Value Ref Range    Magnesium 1.8 1.6 - 2.6 mg/dL   Troponin   Result Value Ref Range    Troponin, High Sensitivity 11 0 - 11 ng/L   Urinalysis, reflex to microscopic   Result Value Ref Range    Color, UA Yellow Straw/Yellow    Clarity, UA Clear Clear    Glucose, Ur Negative Negative mg/dL    Bilirubin Urine Negative Negative    Ketones, Urine Negative Negative mg/dL    Specific Gravity, UA 1.015 1.005 - 1.030    Blood, Urine Negative Negative    pH, UA 6.0 5.0 - 9.0    Protein, UA Negative Negative mg/dL    Urobilinogen, Urine 0.2 <2.0 E.U./dL    Nitrite, Urine Negative Negative    Leukocyte Esterase, Urine Negative Negative   APTT   Result Value Ref Range    aPTT 67.7 (H) 24.5 - 35.1 sec   Brain Natriuretic Peptide   Result Value Ref Range    Pro- (H) 0 - 125 pg/mL   Troponin   Result Value Ref Range    Troponin, High Sensitivity 11 0 - 11 ng/L   EKG 12 Lead   Result Value Ref Range    Ventricular Rate 66 BPM    Atrial Rate 66 BPM    P-R Interval 158 ms    QRS Duration 104 ms    Q-T Interval 402 ms    QTc Calculation (Bazett) 421 ms    P Axis 56 degrees    R Axis -4 degrees    T Axis 48 degrees       Radiology:  CT Head WO Contrast   Final Result   No acute intracranial abnormality. MRI would be useful if symptoms persist.      RECOMMENDATIONS:   Unavailable         XR CHEST PORTABLE   Final Result   Patchy opacities may represent areas of residual scarring. Acute infiltrates   from pneumonia not entirely excluded. Overall, improved aeration from   previous. Short-term follow-up recommended if symptoms persist.             ------------------------- NURSING NOTES AND VITALS REVIEWED ---------------------------  Date / Time Roomed:  12/18/2021  6:03 AM  ED Bed Assignment:  ROSS/ROSS    The nursing notes within the ED encounter and vital signs as below have been reviewed. /82   Pulse 88   Temp 96.8 °F (36 °C) (Oral)   Resp 18   Ht 6' 3\" (1.905 m)   Wt 246 lb (111.6 kg)   SpO2 98%   BMI 30.75 kg/m²   Oxygen Saturation Interpretation: Normal      ------------------------------------------ PROGRESS NOTES ------------------------------------------  9:58 AM EST  I have spoken with the patient and discussed todays results, in addition to providing specific details for the plan of care and counseling regarding the diagnosis and prognosis. Their questions are answered at this time and they are agreeable with the plan. I discussed at length with them reasons for immediate return here for re evaluation. They will followup with their primary care physician, pulmonologist, and neurologist by calling their office on Monday.      --------------------------------- ADDITIONAL PROVIDER NOTES ---------------------------------  At this time the patient is without objective evidence of an acute process requiring hospitalization or inpatient management.   They have remained hemodynamically stable throughout their entire ED visit and are stable for discharge with outpatient follow-up. The plan has been discussed in detail and they are aware of the specific conditions for emergent return, as well as the importance of follow-up. Discharge Medication List as of 12/18/2021  9:11 AM          Diagnosis:  1. Sleep disturbance        Disposition:  Patient's disposition: Discharge to home  Patient's condition is stable.        Magen James MD  Resident  12/18/21 8897

## 2021-12-18 NOTE — ED NOTES
Bed: 22  Expected date:   Expected time:   Means of arrival: [facility]  Comments:  Edmar Mccurdy RN  12/18/21 [id number]

## 2021-12-19 ENCOUNTER — APPOINTMENT (OUTPATIENT)
Dept: GENERAL RADIOLOGY | Age: 51
DRG: 100 | End: 2021-12-19
Payer: COMMERCIAL

## 2021-12-19 ENCOUNTER — APPOINTMENT (OUTPATIENT)
Dept: MRI IMAGING | Age: 51
DRG: 100 | End: 2021-12-19
Payer: COMMERCIAL

## 2021-12-19 PROBLEM — K86.9 PANCREATIC LESION: Status: ACTIVE | Noted: 2021-12-19

## 2021-12-19 PROBLEM — G40.009 PARTIAL IDIOPATHIC EPILEPSY WITH SEIZURES OF LOCALIZED ONSET, NOT INTRACTABLE, WITHOUT STATUS EPILEPTICUS (HCC): Status: ACTIVE | Noted: 2021-12-18

## 2021-12-19 LAB
ANION GAP SERPL CALCULATED.3IONS-SCNC: 12 MMOL/L (ref 7–16)
BASOPHILS ABSOLUTE: 0.02 E9/L (ref 0–0.2)
BASOPHILS RELATIVE PERCENT: 0.4 % (ref 0–2)
BUN BLDV-MCNC: 14 MG/DL (ref 6–20)
CALCIUM SERPL-MCNC: 9.1 MG/DL (ref 8.6–10.2)
CHLORIDE BLD-SCNC: 104 MMOL/L (ref 98–107)
CO2: 24 MMOL/L (ref 22–29)
CREAT SERPL-MCNC: 1.1 MG/DL (ref 0.7–1.2)
EKG ATRIAL RATE: 66 BPM
EKG P AXIS: 56 DEGREES
EKG P-R INTERVAL: 158 MS
EKG Q-T INTERVAL: 402 MS
EKG QRS DURATION: 104 MS
EKG QTC CALCULATION (BAZETT): 421 MS
EKG R AXIS: -4 DEGREES
EKG T AXIS: 48 DEGREES
EKG VENTRICULAR RATE: 66 BPM
EOSINOPHILS ABSOLUTE: 0.07 E9/L (ref 0.05–0.5)
EOSINOPHILS RELATIVE PERCENT: 1.2 % (ref 0–6)
GFR AFRICAN AMERICAN: >60
GFR NON-AFRICAN AMERICAN: >60 ML/MIN/1.73
GLUCOSE BLD-MCNC: 115 MG/DL (ref 74–99)
HCT VFR BLD CALC: 38.1 % (ref 37–54)
HEMOGLOBIN: 12 G/DL (ref 12.5–16.5)
IMMATURE GRANULOCYTES #: 0.02 E9/L
IMMATURE GRANULOCYTES %: 0.4 % (ref 0–5)
INR BLD: 2.6
LYMPHOCYTES ABSOLUTE: 1.02 E9/L (ref 1.5–4)
LYMPHOCYTES RELATIVE PERCENT: 18.1 % (ref 20–42)
MCH RBC QN AUTO: 23.7 PG (ref 26–35)
MCHC RBC AUTO-ENTMCNC: 31.5 % (ref 32–34.5)
MCV RBC AUTO: 75.3 FL (ref 80–99.9)
MONOCYTES ABSOLUTE: 0.46 E9/L (ref 0.1–0.95)
MONOCYTES RELATIVE PERCENT: 8.2 % (ref 2–12)
NEUTROPHILS ABSOLUTE: 4.03 E9/L (ref 1.8–7.3)
NEUTROPHILS RELATIVE PERCENT: 71.7 % (ref 43–80)
PDW BLD-RTO: 16.9 FL (ref 11.5–15)
PLATELET # BLD: 205 E9/L (ref 130–450)
PMV BLD AUTO: 10.4 FL (ref 7–12)
POTASSIUM REFLEX MAGNESIUM: 4.2 MMOL/L (ref 3.5–5)
PROTHROMBIN TIME: 28.3 SEC (ref 9.3–12.4)
RBC # BLD: 5.06 E12/L (ref 3.8–5.8)
SODIUM BLD-SCNC: 140 MMOL/L (ref 132–146)
WBC # BLD: 5.6 E9/L (ref 4.5–11.5)

## 2021-12-19 PROCEDURE — 99222 1ST HOSP IP/OBS MODERATE 55: CPT | Performed by: PSYCHIATRY & NEUROLOGY

## 2021-12-19 PROCEDURE — 85025 COMPLETE CBC W/AUTO DIFF WBC: CPT

## 2021-12-19 PROCEDURE — 85610 PROTHROMBIN TIME: CPT

## 2021-12-19 PROCEDURE — 6370000000 HC RX 637 (ALT 250 FOR IP): Performed by: INTERNAL MEDICINE

## 2021-12-19 PROCEDURE — 73030 X-RAY EXAM OF SHOULDER: CPT

## 2021-12-19 PROCEDURE — A9579 GAD-BASE MR CONTRAST NOS,1ML: HCPCS | Performed by: RADIOLOGY

## 2021-12-19 PROCEDURE — 2580000003 HC RX 258: Performed by: NURSE PRACTITIONER

## 2021-12-19 PROCEDURE — 2060000000 HC ICU INTERMEDIATE R&B

## 2021-12-19 PROCEDURE — 6370000000 HC RX 637 (ALT 250 FOR IP): Performed by: NURSE PRACTITIONER

## 2021-12-19 PROCEDURE — 36415 COLL VENOUS BLD VENIPUNCTURE: CPT

## 2021-12-19 PROCEDURE — 2500000003 HC RX 250 WO HCPCS: Performed by: NURSE PRACTITIONER

## 2021-12-19 PROCEDURE — 80048 BASIC METABOLIC PNL TOTAL CA: CPT

## 2021-12-19 PROCEDURE — 70553 MRI BRAIN STEM W/O & W/DYE: CPT

## 2021-12-19 PROCEDURE — 6360000004 HC RX CONTRAST MEDICATION: Performed by: RADIOLOGY

## 2021-12-19 PROCEDURE — 6360000002 HC RX W HCPCS: Performed by: NURSE PRACTITIONER

## 2021-12-19 RX ORDER — HYDROCODONE BITARTRATE AND ACETAMINOPHEN 7.5; 325 MG/1; MG/1
1 TABLET ORAL EVERY 8 HOURS PRN
Status: DISCONTINUED | OUTPATIENT
Start: 2021-12-19 | End: 2021-12-20

## 2021-12-19 RX ORDER — LEVETIRACETAM 500 MG/1
500 TABLET ORAL 2 TIMES DAILY
Status: DISCONTINUED | OUTPATIENT
Start: 2021-12-19 | End: 2021-12-22 | Stop reason: HOSPADM

## 2021-12-19 RX ADMIN — DOXYCYCLINE 100 MG: 100 INJECTION, POWDER, LYOPHILIZED, FOR SOLUTION INTRAVENOUS at 22:10

## 2021-12-19 RX ADMIN — HYDROCODONE BITARTRATE AND ACETAMINOPHEN 1 TABLET: 7.5; 325 TABLET ORAL at 18:50

## 2021-12-19 RX ADMIN — DILTIAZEM HYDROCHLORIDE 120 MG: 120 CAPSULE, COATED, EXTENDED RELEASE ORAL at 00:49

## 2021-12-19 RX ADMIN — WATER 1000 MG: 1 INJECTION INTRAMUSCULAR; INTRAVENOUS; SUBCUTANEOUS at 06:46

## 2021-12-19 RX ADMIN — ACETAMINOPHEN 650 MG: 325 TABLET ORAL at 16:35

## 2021-12-19 RX ADMIN — WARFARIN SODIUM 7.5 MG: 7.5 TABLET ORAL at 17:53

## 2021-12-19 RX ADMIN — WARFARIN SODIUM 5 MG: 5 TABLET ORAL at 00:49

## 2021-12-19 RX ADMIN — DOXYCYCLINE 100 MG: 100 INJECTION, POWDER, LYOPHILIZED, FOR SOLUTION INTRAVENOUS at 10:18

## 2021-12-19 RX ADMIN — Medication 10 ML: at 22:16

## 2021-12-19 RX ADMIN — DILTIAZEM HYDROCHLORIDE 120 MG: 120 CAPSULE, COATED, EXTENDED RELEASE ORAL at 21:07

## 2021-12-19 RX ADMIN — HYDROCODONE BITARTRATE AND ACETAMINOPHEN 1 TABLET: 7.5; 325 TABLET ORAL at 10:18

## 2021-12-19 RX ADMIN — GADOTERIDOL 20 ML: 279.3 INJECTION, SOLUTION INTRAVENOUS at 12:02

## 2021-12-19 RX ADMIN — METOPROLOL SUCCINATE 50 MG: 50 TABLET, EXTENDED RELEASE ORAL at 10:18

## 2021-12-19 ASSESSMENT — PAIN SCALES - GENERAL
PAINLEVEL_OUTOF10: 7
PAINLEVEL_OUTOF10: 6
PAINLEVEL_OUTOF10: 0
PAINLEVEL_OUTOF10: 5
PAINLEVEL_OUTOF10: 7
PAINLEVEL_OUTOF10: 6

## 2021-12-19 ASSESSMENT — PAIN DESCRIPTION - PROGRESSION: CLINICAL_PROGRESSION: NOT CHANGED

## 2021-12-19 NOTE — PROGRESS NOTES
OT SESSION ATTEMPT     Date:2021  Patient Name: Robinson Weinberg  MRN: [id number]  : 1970  Room: [address] [place], [place] [address]     Attempted OT session this date:    [] unavailable due to other medical staff currently with pt   [] on hold, await MRI/ neurosurgical recommendations. [] on hold per nursing staff secondary to lab / radiology results    [] declined Occupational Therapy  this date due to ___. Benefits of participation in therapy reviewed with pt.    [] off unit   [x] Other: hold /await  MRI/x-ray results and ortho recommendations. Will reattempt OT eval at a later time.     [place], [place] [id number]

## 2021-12-19 NOTE — CONSULTS
that time. This admission he is also noted to have an abrasion from the bite on his interior lower lip as well as bilateral humeral fractures proximally. He has no history of seizures. No history of birth complications, developmental delay, learning disabilities, concussion with LOC, stroke, CNS neoplasm or infection. He has a history of aortic valve replacement in 2018 and is on warfarin. He reports that the valve was replaced due to complications related to lupus anticoagulant and antiphospholipid syndrome.      Review of Systems:  Constitutional  · Weight loss: No  · Fever: No    Eyes  · Double Vision: No  · Visions loss: No    Ears, Nose, Mouth, and Throat  · Pain in mouth related to bite during episode    Cardiovascular  · Chest Pain: No    Respiratory  · Shortness of Breath: No, on O2 via NC    Gastrointestinal  · Abdominal Pain: No    Genitourinary  · Difficulty with Urination: No    Integumentary  · Rash: No    Musculoskeletal  · Back Pain: No    Neurological  · Seizures: as per HPI  · Further symptoms noted in HPI    Psychiatric  · Anxiety: No  · Depression: No    Complete 10-point review of systems is negative except as noted above in my HPI    Medical History:   Past Medical History:   Diagnosis Date    DVT (deep venous thrombosis) (HCC)     Lupus anticoagulant disorder (HCC)         Surgical History:   Past Surgical History:   Procedure Laterality Date    AORTIC VALVE REPLACEMENT  01/2019    CHOLECYSTECTOMY      HERNIA REPAIR      x 2        Family History:   No family history of epilepsy reported    Social History:  Social History     Tobacco Use    Smoking status: Never Smoker    Smokeless tobacco: Never Used   Vaping Use    Vaping Use: Never used   Substance Use Topics    Alcohol use: Not Currently    Drug use: Never        Current Medications:      Current Facility-Administered Medications   Medication Dose Route Frequency Provider Last Rate Last Admin    cefTRIAXone (ROCEPHIN) 1,000 mg in sterile water 10 mL IV syringe  1,000 mg IntraVENous Q24H April Spenser, APRN - CNP   1,000 mg at 12/19/21 0646    doxycycline (VIBRAMYCIN) 100 mg in dextrose 5 % 100 mL IVPB  100 mg IntraVENous Q12H April Jose Franciscous, APRN - CNP   Stopped at 12/19/21 1118    HYDROcodone-acetaminophen (NORCO) 7.5-325 MG per tablet 1 tablet  1 tablet Oral Q8H PRN Chanda Leary MD   1 tablet at 12/19/21 1018    levETIRAcetam (KEPPRA) tablet 500 mg  500 mg Oral BID Nicholette Aquiles, DO        sodium chloride flush 0.9 % injection 5-40 mL  5-40 mL IntraVENous 2 times per day April TIFFANIE DueñasN - CNP   10 mL at 12/18/21 2154    sodium chloride flush 0.9 % injection 5-40 mL  5-40 mL IntraVENous PRN April Spenser, APRN - CNP        0.9 % sodium chloride infusion  25 mL IntraVENous PRN April TIFFANIE DueñasN - KENZIE        ondansetron (ZOFRAN-ODT) disintegrating tablet 4 mg  4 mg Oral Q8H PRN April TIFFANIE DueñasN - KENZIE        Or    ondansetron [facility]) injection 4 mg  4 mg IntraVENous Q6H PRN April Jose Franciscous, APRN - CNP        polyethylene glycol (GLYCOLAX) packet 17 g  17 g Oral Daily PRN April Spenser APRN - CNP        acetaminophen (TYLENOL) tablet 650 mg  650 mg Oral Q6H PRN April Jose Franciscous, APRN - CNP        Or    acetaminophen (TYLENOL) suppository 650 mg  650 mg Rectal Q6H PRN April Jose Franciscous, APRN - CNP        ipratropium-albuterol (DUONEB) nebulizer solution 1 ampule  1 ampule Inhalation Q6H PRN April Spenser, APRN - KENZIE        fluticasone (FLONASE) 50 MCG/ACT nasal spray 2 spray  2 spray Each Nostril BID April TIFFANIE DueñasN - CNP        dilTIAZem (CARDIZEM CD) extended release capsule 120 mg  120 mg Oral Nightly April TERRIE Dueñas CNP   120 mg at 12/19/21 0049    metoprolol succinate (TOPROL XL) extended release tablet 50 mg  50 mg Oral Daily April TERRIE Dueñas CNP   50 mg at 12/19/21 1018    warfarin (COUMADIN) tablet 5 mg  5 mg Oral Once per day on Wed Sat April TERRIE Dueñas - CNP   5 mg at 12/19/21 0049    warfarin (COUMADIN) tablet 7.5 mg  7.5 mg Oral Once per day on Sun Mon Tue Thu Fri April TERRIE Dueñas - CNP            Allergies:      No Known Allergies     Physical Examination  Vitals   Vitals:    12/19/21 0030 12/19/21 0400 12/19/21 0730 12/19/21 1445   BP: 127/82 124/86 (!) 151/100 (!) 149/100   Pulse: 65 66 69 73   Resp: 17 22 22 18   Temp: 97.1 °F (36.2 °C) 96.7 °F (35.9 °C) 98.6 °F (37 °C) 96.7 °F (35.9 °C)   TempSrc: Temporal Temporal Temporal Temporal   SpO2: 94% 93% 95% 95%   Weight:  247 lb (112 kg)     Height:            General: Patient appears in no acute distress. Awake and oriented x 3. HEENT: Normocephalic, atraumatic  Chest: Clear to auscultation bilaterally  Heart: No murmurs appreciated  Extremities/Peripheral vascular: No edema/swelling noted. No cold limbs noted. Neurologic Examination    Mental Status  Alert, and oriented to person, place and time. Speech is fluent with intact comprehension. No evidence of memory impairment. Attention and concentration appeared normal.     Cranial Nerves  II. Visual fields full to confrontation bilaterally. Fundoscopic exam: Red reflex present bilaterally. III, IV, VI: Pupils equally round and reactive to light, 3 to 2 mm bilaterally. EOMs: full, no nystagmus. V. Facial sensation intact to light touch bilaterally  VII: Facial movements symmetric and strong  VIII: Hearing intact to voice  IX,X: Palate elevates symmetrically.  No dysarthria  XI: Sternocleidomastoid and trapezius 5/5 bilaterally   XII: Tongue is midline    Motor     Right Left   Right Left   Deltoid NT NT  Hip Flexion 5 5   Biceps      5  5  Knee Extension 5 5   Triceps 5 5  Knee Flexion 5 5   Handgrip 5 5  Ankle Dorsiflexion 5 5       Ankle Plantarflexion 5 5       Sensation  · Light Touch: Intact distally in all four limbs    Reflexes     Right Left   Biceps 2 2   Brachioradialis 2 2   Patellar 2 2   Achilles 2 2   ankle clonus none none     Toes down going bilaterally. Coordination  Rapid alternating movements normal in bilateral upper extremities  Finger to nose testing not done due to bilateral shoulder injury  Heel to shin testing normal bilaterally    Gait  Deferred for safety/fall consideration      Labs  Recent Labs     12/18/21  0645 12/18/21  1521 12/19/21  0454      < > 140   K 4.1   < > 4.2   *   < > 104   CO2 18*   < > 24   BUN 11   < > 14   CREATININE 0.8   < > 1.1   GLUCOSE 106*   < > 115*   CALCIUM 7.3*   < > 9.1   PROT 5.7*  --   --    LABALBU 3.2*  --   --    BILITOT <0.2  --   --    ALKPHOS 48  --   --    AST 18  --   --    ALT 14  --   --    WBC 7.8   < > 5.6   RBC 5.12   < > 5.06   HGB 11.8*   < > 12.0*   HCT 38.5   < > 38.1   MCV 75.2*   < > 75.3*   MCH 23.0*   < > 23.7*   MCHC 30.6*   < > 31.5*   RDW 16.7*   < > 16.9*      < > 205   MPV 10.2   < > 10.4    < > = values in this interval not displayed. Imaging  MRI BRAIN W WO CONTRAST   Final Result   Old micro bleed in the left occipital periventricular white matter. Left parietal lobe volume loss      No typical white matter hyperintensities of systemic lupus erythematosus. No evidence of recent infarct, intracranial hemorrhage, mass effect, or   hydrocephalus. No abnormal intracranial enhancement. XR SHOULDER RIGHT (MIN 2 VIEWS)   Final Result   1 cm calcified fragment may represent a fracture fragment. XR SHOULDER LEFT (MIN 2 VIEWS)   Final Result   Possible fracture fragment adjacent to the glenoid. Evaluation mildly due to nonstandard projections         CTA PULMONARY W CONTRAST   Final Result   1. No acute pulmonary embolus is identified within limitations   2. Pulmonary aeration is greatly improved   3. Redemonstrated pancreatic body lesion, recommend dedicated MRI or CT   evaluation   4.  Developed comminuted medial humeral head fractures         MRI ABDOMEN W WO CONTRAST MRCP    (Results Pending)         I have personally reviewed the above MRI with the patient's spouse.            Electronically signed by Tono King DO on 12/19/2021 at 4:35 PM

## 2021-12-19 NOTE — CONSULTS
Department of Orthopedic Surgery  Resident Consult Note  Reason for Consult:  Bilateral shoulder pain    HISTORY OF PRESENT ILLNESS:       Patient is a 46 y.o. male with bilateral shoulder pain. Patient presented to [facility] ED yesterday with complaints of possible sleep apnea versus seizure. Patient reports that his wife believes he had a seizure and that he does not remember the events immediately prior to arrival at the ED. As he sat in the ER yesterday he noted progressively worsening bilateral shoulder pain and inability to lift either shoulder actively. Denies trauma or falls patient denies prior shoulder pain. Denies numbness/tingling/paresthesias. Denies any other orthopedic complaints at this time.       Past Medical History:        Diagnosis Date    DVT (deep venous thrombosis) (HCC)     Lupus anticoagulant disorder (HCC)      Past Surgical History:        Procedure Laterality Date    AORTIC VALVE REPLACEMENT  01/2019    CHOLECYSTECTOMY      HERNIA REPAIR      x 2     Current Medications:   Current Facility-Administered Medications: cefTRIAXone (ROCEPHIN) 1,000 mg in sterile water 10 mL IV syringe, 1,000 mg, IntraVENous, Q24H  doxycycline (VIBRAMYCIN) 100 mg in dextrose 5 % 100 mL IVPB, 100 mg, IntraVENous, Q12H  sodium chloride flush 0.9 % injection 5-40 mL, 5-40 mL, IntraVENous, 2 times per day  sodium chloride flush 0.9 % injection 5-40 mL, 5-40 mL, IntraVENous, PRN  0.9 % sodium chloride infusion, 25 mL, IntraVENous, PRN  ondansetron (ZOFRAN-ODT) disintegrating tablet 4 mg, 4 mg, Oral, Q8H PRN **OR** ondansetron (ZOFRAN) injection 4 mg, 4 mg, IntraVENous, Q6H PRN  polyethylene glycol (GLYCOLAX) packet 17 g, 17 g, Oral, Daily PRN  acetaminophen (TYLENOL) tablet 650 mg, 650 mg, Oral, Q6H PRN **OR** acetaminophen (TYLENOL) suppository 650 mg, 650 mg, Rectal, Q6H PRN  ipratropium-albuterol (DUONEB) nebulizer solution 1 ampule, 1 ampule, Inhalation, Q6H PRN  fluticasone (FLONASE) 50 MCG/ACT nasal spray 2 spray, 2 spray, Each Nostril, BID  dilTIAZem (CARDIZEM CD) extended release capsule 120 mg, 120 mg, Oral, Nightly  metoprolol succinate (TOPROL XL) extended release tablet 50 mg, 50 mg, Oral, Daily  warfarin (COUMADIN) tablet 5 mg, 5 mg, Oral, Once per day on Wed Sat  warfarin (COUMADIN) tablet 7.5 mg, 7.5 mg, Oral, Once per day on Sun Mon Tue Thu Fri  Allergies:  Patient has no known allergies. Social History:   TOBACCO:   reports that he has never smoked. He has never used smokeless tobacco.  ETOH:   reports previous alcohol use. DRUGS:   reports no history of drug use. ACTIVITIES OF DAILY LIVING:    OCCUPATION:    Family History:   History reviewed. No pertinent family history.     REVIEW OF SYSTEMS:  CONSTITUTIONAL:  negative for fevers, chills  EYES:  negative for acute changes  HEENT:  negative for acute changes  RESPIRATORY:  negative for dyspnea  CARDIOVASCULAR:  negative for chest pain, palpitations  GASTROINTESTINAL:  negative for nausea, vomiting  GENITOURINARY:  negative for frequency  HEMATOLOGIC/LYMPHATIC:  negative for bleeding and petechiae  MUSCULOSKELETAL:  positive for bilateral shoulder pain  NEUROLOGICAL:  negative for head trauma or LOC  BEHAVIOR/PSYCH:  negative for increased agitation and anxiety    PHYSICAL EXAM:    VITALS:  BP (!) 151/100   Pulse 69   Temp 98.6 °F (37 °C) (Temporal)   Resp 22   Ht 6' 3\" (1.905 m)   Wt 247 lb (112 kg)   SpO2 95%   BMI 30.87 kg/m²   CONSTITUTIONAL:  awake, alert, cooperative, no apparent distress, and appears stated age    MUSCULOSKELETAL:  Bilateral upper Extremity:  Skin intact circumferentially  Mildly +TTP bilateral shoulders  Compartments soft and compressible  +AIN/PIN/Ulnar nerve function intact grossly  +Radial pulse, Brisk Cap refill, hand warm and perfused  Sensation intact to touch in radial/ulnar/median nerve distributions to hand  Patient unable to actively flex or abduct the shoulder    Secondary Exam: · Bilateral LE: No obvious signs of trauma. -TTP to foot, ankle, leg, knee, thigh, hip.-- Patient able to flex/extend toes, ankle, knee and hip with active and passive ROM without pain,+2/4 DP & PT pulses, cap refill <3sec, +5/5 PF/DF/EHL, distal sensation grossly intact to L4-S1 dermatomes, compartments soft and compressible. · Pelvis: -TTP, -Log roll, -Heel strike     DATA:    CBC:   Lab Results   Component Value Date    WBC 5.6 12/19/2021    RBC 5.06 12/19/2021    HGB 12.0 12/19/2021    HCT 38.1 12/19/2021    MCV 75.3 12/19/2021    MCH 23.7 12/19/2021    MCHC 31.5 12/19/2021    RDW 16.9 12/19/2021     12/19/2021    MPV 10.4 12/19/2021     PT/INR:    Lab Results   Component Value Date    PROTIME 28.3 12/19/2021    INR 2.6 12/19/2021       Radiology Review:  X-ray right shoulder: Pending    X-ray left shoulder: Pending    CTA chest: Bilateral reverse Hill-Sachs lesions noted about both proximal humerus. The shoulders are located. The study is limited secondary to lack of adequate visualization of the humerus bones.     IMPRESSION:  · Closed right proximal humerus fracture  · Closed left proximal humerus fracture    PLAN:  · Nonweightbearing bilateral upper extremities  · Sling as needed for comfort bilaterally, okay for cuff and collar  · Rest, ice, elevate bilateral upper extremities as needed for pain control  · Multimodal pain control per primary service  · Obtain x-ray right shoulder  · Obtain x-ray left shoulder  · Plan was discussed with attending    All questions were sought and answered during encounter    Electronically signed by Dagoberto Yousif DO on 12/19/2021 at 8:01 AM

## 2021-12-19 NOTE — H&P
Indiana Galeano M.D. History and Physical      CHIEF COMPLAINT: Seizure-like activity, recurrent    Reason for Admission: As above    History Obtained From: Patient/EMR    HISTORY OF PRESENT ILLNESS:      The patient is a 46 y.o. male of Corina Bass DO with significant past medical history of thrombophilia with lupus anticoagulant, history of DVT, valve replacement on Coumadin therapy chronically who presents with complaints of seizure-like activity at home. He is not clear if he actually did have seizures. He was told by his wife that he was found shaking but she thought it might have been a manifestation of his sleep apnea (?) Apparently similar episode a couple of months ago. No signs or symptoms of urinary or bowel incontinence tongue biting associated with seizure activity. Work-up in emergency department did reveal a questionable humeral fracture on CTA of the chest that was done. Patient subsequently admitted for further neurological and orthopedic work-up. He denies any trauma that may have caused bilateral shoulder fractures. He denies hitting his shoulders on anything. X-rays of bilateral shoulders do indicate possible fracture fragments.            All labs personally reviewed   All imaging personally reviewed     Past Medical History:        Diagnosis Date    DVT (deep venous thrombosis) (HCC)     Lupus anticoagulant disorder (HCC)      Past Surgical History:        Procedure Laterality Date    AORTIC VALVE REPLACEMENT  01/2019    CHOLECYSTECTOMY      HERNIA REPAIR      x 2         Medications Prior to Admission:    Medications Prior to Admission: warfarin (COUMADIN) 7.5 MG tablet, Take 7.5 mg by mouth Five times weekly on Sunday, Monday, Tuesday, Thursday and Friday  dilTIAZem (CARDIZEM CD) 120 MG extended release capsule, Take 120 mg by mouth daily  metoprolol succinate (TOPROL XL) 25 MG extended release tablet, Take 50 mg by mouth daily   fluticasone (FLONASE) 50 MCG/ACT nasal spray, 2 sprays in each nostril bid  warfarin (COUMADIN) 5 MG tablet, Take 5 mg by mouth Twice a Week On Wednesday and Saturday    Allergies:  Patient has no known allergies. Social History:   TOBACCO:   reports that he has never smoked. He has never used smokeless tobacco.  ETOH:   reports previous alcohol use. MARITAL STATUS:    OCCUPATION:      Family History:   History reviewed. No pertinent family history. REVIEW OF SYSTEMS:    General ROS: Generalized weakness and fatigue, bilateral shoulder pain  Hematological and Lymphatic ROS: negative  Endocrine ROS: negative  Respiratory ROS: no cough, shortness of breath, or wheezing  Cardiovascular ROS: no chest pain or dyspnea on exertion  Gastrointestinal ROS: no abdominal pain, change in bowel habits, or black or bloody stools  Genito-Urinary ROS: no dysuria, trouble voiding, or hematuria  Neurological ROS: no TIA or stroke symptoms  negative    Vitals:  BP (!) 151/100   Pulse 69   Temp 98.6 °F (37 °C) (Temporal)   Resp 22   Ht 6' 3\" (1.905 m)   Wt 247 lb (112 kg)   SpO2 95%   BMI 30.87 kg/m²     PHYSICAL EXAM:  General:  Awake, alert, oriented X 3. Well developed, well nourished, well groomed. No apparent distress. HEENT:  Normocephalic, atraumatic. Pupils equal, round, reactive to light. No scleral icterus. No conjunctival injection. Normal lips, teeth, and gums. No nasal discharge. Neck:  Supple, FROM  Heart:  RRR, no murmurs, gallops, rubs, carotid upstroke normal, no carotid bruits  Lungs:  CTA bilaterally, bilat symmetrical expansion, no wheeze, rales, or rhonchi  Abdomen:   Bowel sounds present, soft, nontender, no masses, no organomegaly, no peritoneal signs  Extremities:  No clubbing, cyanosis, or edema-limited upper extremity range of motion secondary to pain bilateral shoulders  Skin:  Warm and dry, no open lesions or rash  Neuro:  Cranial nerves 2-12 intact, no focal deficits  Vascular: Radial and pedal Pulses 2+  Breast: deferred  Rectal: deferred  Genitalia:  deferred      DATA:     Recent Labs     12/18/21  0645 12/18/21  1521 12/19/21  0454   WBC 7.8 10.7 5.6   HGB 11.8* 12.6 12.0*    207 205     Recent Labs     12/18/21  0645 12/18/21  1521 12/19/21  0454    140 140   K 4.1 3.7 4.2   BUN 11 14 14   CREATININE 0.8 1.1 1.1     Recent Labs     12/18/21  0645 12/18/21  1521 12/19/21  0454   PROT 5.7*  --   --    INR 2.1 2.4 2.6     Recent Labs     12/18/21  0645   AST 18   ALT 14   ALKPHOS 48   BILITOT <0.2     No results for input(s): BNP in the last 72 hours. No results for input(s): CKTOTAL, CKMB, CKMBINDEX, TROPONINI in the last 72 hours. ASSESSMENT:      Principal Problem:    Acute respiratory failure with hypoxia (HCC)  Active Problems:    Seizure-like activity (HCC)    Clotting disorder (HCC)    Chronic anticoagulation    Obesity (BMI 30-39. 9)    Hypoxia    Pancreatic lesion  Resolved Problems:    * No resolved hospital problems.  *        PLAN:  Admit to telemetry for evaluation of seizures  Empiric antiepileptic-Keppra twice daily  EEG  MRI brain  Neurology evaluation  Neurochecks every hour  Seizure precautions    humeral head fracture bilateral without trauma  Unclear from CTA chest report if this is bilateral,-x-rays confirm probable fractures bilaterally  of unknown etiology  Pain control  Orthopedic evaluation as it is unclear why patient has bilateral humeral fractures without any trauma, actually to assess if these actually are fractures    History of lupus anticoagulant/thrombophilic state  D-dimer markedly elevated at 3000  Patient is already on Coumadin at home-continue maintain INR between 2.5 -3.5 secondary to history of valve replacement    Questionable pancreatic tail mass  Obtain MRI/MRCP abdomen to further assess for possible malignancy        DVT prophylaxis  PT OT  Discharge plan    Tiffani Wild MD  12/19/2021  8:10 AM

## 2021-12-20 ENCOUNTER — APPOINTMENT (OUTPATIENT)
Dept: MRI IMAGING | Age: 51
DRG: 100 | End: 2021-12-20
Payer: COMMERCIAL

## 2021-12-20 ENCOUNTER — APPOINTMENT (OUTPATIENT)
Dept: CT IMAGING | Age: 51
DRG: 100 | End: 2021-12-20
Payer: COMMERCIAL

## 2021-12-20 ENCOUNTER — APPOINTMENT (OUTPATIENT)
Dept: NEUROLOGY | Age: 51
DRG: 100 | End: 2021-12-20
Payer: COMMERCIAL

## 2021-12-20 LAB
INR BLD: 2.7
PROTHROMBIN TIME: 29.8 SEC (ref 9.3–12.4)

## 2021-12-20 PROCEDURE — 6370000000 HC RX 637 (ALT 250 FOR IP): Performed by: STUDENT IN AN ORGANIZED HEALTH CARE EDUCATION/TRAINING PROGRAM

## 2021-12-20 PROCEDURE — A9579 GAD-BASE MR CONTRAST NOS,1ML: HCPCS | Performed by: RADIOLOGY

## 2021-12-20 PROCEDURE — 97535 SELF CARE MNGMENT TRAINING: CPT

## 2021-12-20 PROCEDURE — 6360000002 HC RX W HCPCS: Performed by: NURSE PRACTITIONER

## 2021-12-20 PROCEDURE — 6370000000 HC RX 637 (ALT 250 FOR IP): Performed by: INTERNAL MEDICINE

## 2021-12-20 PROCEDURE — 36415 COLL VENOUS BLD VENIPUNCTURE: CPT

## 2021-12-20 PROCEDURE — 2580000003 HC RX 258: Performed by: NURSE PRACTITIONER

## 2021-12-20 PROCEDURE — 6370000000 HC RX 637 (ALT 250 FOR IP): Performed by: PSYCHIATRY & NEUROLOGY

## 2021-12-20 PROCEDURE — 97165 OT EVAL LOW COMPLEX 30 MIN: CPT

## 2021-12-20 PROCEDURE — 6370000000 HC RX 637 (ALT 250 FOR IP): Performed by: NURSE PRACTITIONER

## 2021-12-20 PROCEDURE — 2060000000 HC ICU INTERMEDIATE R&B

## 2021-12-20 PROCEDURE — 6360000002 HC RX W HCPCS: Performed by: INTERNAL MEDICINE

## 2021-12-20 PROCEDURE — 73200 CT UPPER EXTREMITY W/O DYE: CPT

## 2021-12-20 PROCEDURE — 97161 PT EVAL LOW COMPLEX 20 MIN: CPT

## 2021-12-20 PROCEDURE — 85610 PROTHROMBIN TIME: CPT

## 2021-12-20 PROCEDURE — 76376 3D RENDER W/INTRP POSTPROCES: CPT

## 2021-12-20 PROCEDURE — 6360000004 HC RX CONTRAST MEDICATION: Performed by: RADIOLOGY

## 2021-12-20 PROCEDURE — 95816 EEG AWAKE AND DROWSY: CPT

## 2021-12-20 PROCEDURE — 95816 EEG AWAKE AND DROWSY: CPT | Performed by: PSYCHIATRY & NEUROLOGY

## 2021-12-20 PROCEDURE — 74183 MRI ABD W/O CNTR FLWD CNTR: CPT

## 2021-12-20 RX ORDER — OXYCODONE HYDROCHLORIDE 10 MG/1
10 TABLET ORAL EVERY 4 HOURS PRN
Status: DISCONTINUED | OUTPATIENT
Start: 2021-12-20 | End: 2021-12-22 | Stop reason: HOSPADM

## 2021-12-20 RX ORDER — OXYCODONE HYDROCHLORIDE AND ACETAMINOPHEN 5; 325 MG/1; MG/1
1 TABLET ORAL EVERY 4 HOURS PRN
Status: DISCONTINUED | OUTPATIENT
Start: 2021-12-20 | End: 2021-12-20

## 2021-12-20 RX ORDER — OXYCODONE HYDROCHLORIDE AND ACETAMINOPHEN 5; 325 MG/1; MG/1
2 TABLET ORAL EVERY 4 HOURS PRN
Status: DISCONTINUED | OUTPATIENT
Start: 2021-12-20 | End: 2021-12-20

## 2021-12-20 RX ORDER — OXYCODONE HYDROCHLORIDE 5 MG/1
5 TABLET ORAL EVERY 4 HOURS PRN
Status: DISCONTINUED | OUTPATIENT
Start: 2021-12-20 | End: 2021-12-22 | Stop reason: HOSPADM

## 2021-12-20 RX ORDER — DOXYCYCLINE HYCLATE 100 MG/1
100 CAPSULE ORAL EVERY 12 HOURS SCHEDULED
Status: DISCONTINUED | OUTPATIENT
Start: 2021-12-20 | End: 2021-12-22 | Stop reason: HOSPADM

## 2021-12-20 RX ORDER — LORAZEPAM 2 MG/ML
1 INJECTION INTRAMUSCULAR ONCE
Status: COMPLETED | OUTPATIENT
Start: 2021-12-20 | End: 2021-12-20

## 2021-12-20 RX ADMIN — ACETAMINOPHEN 650 MG: 325 TABLET ORAL at 08:15

## 2021-12-20 RX ADMIN — DILTIAZEM HYDROCHLORIDE 120 MG: 120 CAPSULE, COATED, EXTENDED RELEASE ORAL at 20:40

## 2021-12-20 RX ADMIN — Medication 10 ML: at 20:40

## 2021-12-20 RX ADMIN — OXYCODONE HYDROCHLORIDE 10 MG: 10 TABLET ORAL at 18:14

## 2021-12-20 RX ADMIN — DOXYCYCLINE HYCLATE 100 MG: 100 CAPSULE ORAL at 20:40

## 2021-12-20 RX ADMIN — ACETAMINOPHEN 650 MG: 325 TABLET ORAL at 00:54

## 2021-12-20 RX ADMIN — LEVETIRACETAM 500 MG: 500 TABLET, FILM COATED ORAL at 20:40

## 2021-12-20 RX ADMIN — WARFARIN SODIUM 7.5 MG: 7.5 TABLET ORAL at 18:10

## 2021-12-20 RX ADMIN — LORAZEPAM 1 MG: 2 INJECTION INTRAMUSCULAR; INTRAVENOUS at 17:05

## 2021-12-20 RX ADMIN — OXYCODONE HYDROCHLORIDE 10 MG: 10 TABLET ORAL at 22:39

## 2021-12-20 RX ADMIN — WATER 1000 MG: 1 INJECTION INTRAMUSCULAR; INTRAVENOUS; SUBCUTANEOUS at 06:17

## 2021-12-20 RX ADMIN — OXYCODONE HYDROCHLORIDE 10 MG: 10 TABLET ORAL at 13:58

## 2021-12-20 RX ADMIN — GADOTERIDOL 20 ML: 279.3 INJECTION, SOLUTION INTRAVENOUS at 18:03

## 2021-12-20 RX ADMIN — HYDROCODONE BITARTRATE AND ACETAMINOPHEN 1 TABLET: 7.5; 325 TABLET ORAL at 02:46

## 2021-12-20 ASSESSMENT — PAIN SCALES - GENERAL
PAINLEVEL_OUTOF10: 3
PAINLEVEL_OUTOF10: 0
PAINLEVEL_OUTOF10: 7
PAINLEVEL_OUTOF10: 6
PAINLEVEL_OUTOF10: 6
PAINLEVEL_OUTOF10: 10
PAINLEVEL_OUTOF10: 0
PAINLEVEL_OUTOF10: 7
PAINLEVEL_OUTOF10: 6
PAINLEVEL_OUTOF10: 0
PAINLEVEL_OUTOF10: 3

## 2021-12-20 ASSESSMENT — PAIN DESCRIPTION - FREQUENCY
FREQUENCY: CONTINUOUS

## 2021-12-20 ASSESSMENT — PAIN DESCRIPTION - LOCATION
LOCATION: SHOULDER

## 2021-12-20 ASSESSMENT — PAIN DESCRIPTION - ORIENTATION
ORIENTATION: RIGHT;LEFT

## 2021-12-20 ASSESSMENT — PAIN DESCRIPTION - DESCRIPTORS
DESCRIPTORS: ACHING;SORE

## 2021-12-20 ASSESSMENT — PAIN DESCRIPTION - ONSET
ONSET: ON-GOING

## 2021-12-20 ASSESSMENT — PAIN DESCRIPTION - PAIN TYPE
TYPE: ACUTE PAIN

## 2021-12-20 ASSESSMENT — PAIN DESCRIPTION - PROGRESSION: CLINICAL_PROGRESSION: NOT CHANGED

## 2021-12-20 NOTE — PROGRESS NOTES
Physical Therapy  Physical Therapy Initial Assessment     Name: Juanito Moses  : 1970  MRN: [id number]      Date of Service: 2021    Evaluating PT:  Jas Mendoza PT, DPLOULOU [id number]     Room #:  8501/8501-B  Diagnosis:  Hypoxia [R09.02]  Acute pain of both shoulders [M25.511, M25.512]  Witnessed seizure-like activity ([facility] [address] [R56.9]  Reason for admission: seizures   Precautions:  Falls, B humeral fxs, , *ortho update: now WBAT BUEs*  Procedure/Surgery:  None   Equipment Recommendations:  None     SUBJECTIVE:  Pt lives with wife and 2 sons in a 2 story home with 2 YURIDIA no rails and 1st floor setup. Pt ambulated with no AD PTA. OBJECTIVE:   Initial Evaluation  Date:  Treatment   Short Term/ Long Term   Goals   AM-PAC 6 Clicks 59/80     Was pt agreeable to Eval/treatment? Yes      Does pt have pain?  BUEs 6/10     Bed Mobility  Rolling: NT  Supine to sit: Mc  Sit to supine: NT  Scooting: Mc to EOB  Independent    Transfers Sit to stand: SBA  Stand to sit: SBA  Stand pivot: NT  Independent    Ambulation    100 feet with no AD SBA    >300 feet with no AD independent   Stair negotiation: ascended and descended  4 steps no rails SBA  >4 steps with no rail independent    ROM BUE:  See OT eval   BLE:  WFL     Strength BUE:  See OT eval   BLE:  knee ext 5/5  Ankle DF 5/5  Increase by 1/3 MMT grade    Balance Sitting EOB:  independent  Dynamic Standing:  SBA  Sitting EOB:  indep  Dynamic Standing:  indep     -Pt is A & O x 3  -Sensation:  Pt denies numbness and tingling to extremities  -Edema:  unremarkable     Therapeutic Exercises:  Functional activity     Patient education  Pt educated on safety, sequencing of transfers, and role of PT    Patient response to education:   Pt verbalized understanding Pt demonstrated skill Pt requires further education in this area   Yes  Yes  Yes      ASSESSMENT:  Conditions Requiring Skilled Therapeutic Intervention:  []Decreased strength     []Decreased ROM  [x]Decreased functional mobility  [x]Decreased balance   [x]Decreased endurance   []Decreased posture  []Decreased sensation  []Decreased coordination   []Decreased vision  []Decreased safety awareness   [x]Increased pain       Comments:  Pt received supine in bed and agreeable to PT session   Pt awake and alert, following all commands -- does show blunted/flat affect. Educated on NWB to BUE with good understanding. Required light assistance to bring self to upright sitting position at EOB d/t difficulty bringing trunk up without use of arms. Stood and ambulated without assist or device. Gait slow but steady with improvement as distance increased. Completed stairs without rail -- experienced minor LOB on steps but was able to self recover. Returned to room   Pt with all needs met and call light in reach. Pt would benefit from continued PT POC to address functional deficits described above. Treatment:  Patient practiced and was instructed in the following treatment:     Patient education provided continuously throughout session for sequencing, safety maintenance, and improving any deficits found during the evaluation. Pt's/ family goals   1. Return home     Patient and or family understand(s) diagnosis, prognosis, and plan of care. Yes     Prognosis is good for reaching above PT goals. PHYSICAL THERAPY PLAN OF CARE:    PT POC is established based on physician order and patient diagnosis     Referring provider/PT Order:    12/18/21 2045  PT eval and treat        April TERRIE Dueñas - CNP     Diagnosis:  Hypoxia [R09.02]  Acute pain of both shoulders [M25.511, M25.512]  Witnessed seizure-like activity ([facility] [address] [R56.9]  Specific instructions for next treatment:  Progress ambulation distance.  Stairs    Current Treatment Recommendations:   [] Strengthening to improve independence with functional mobility   [] ROM to improve independence with functional mobility   [x] Balance Training to improve

## 2021-12-20 NOTE — PROGRESS NOTES
Department of Orthopedic Surgery  Resident Progress Note      Patient seen and examined. Pain controlled. No new complaints. Denies chest pain, shortness of breath, dizziness/lightheadedness. VITALS:  BP (!) 149/88   Pulse 74   Temp 96 °F (35.6 °C) (Temporal)   Resp 19   Ht 6' 3\" (1.905 m)   Wt 250 lb (113.4 kg)   SpO2 93%   BMI 31.25 kg/m²     General: in no acute distress, alert, and with flat affect    MUSCULOSKELETAL:   bilateral upper extremity:  · Compartments soft and compressible  · +AIN/PIN/Ulnar nerve function intact grossly  · +Radial pulse, Brisk Cap refill, hand warm and perfused  · Sensation intact to touch in radial/ulnar/median nerve distributions to hand    CBC:   Lab Results   Component Value Date    WBC 5.6 12/19/2021    HGB 12.0 12/19/2021    HCT 38.1 12/19/2021     12/19/2021     PT/INR:    Lab Results   Component Value Date    PROTIME 28.3 12/19/2021    INR 2.6 12/19/2021       ASSESSMENT  · Bilateral humeral head fractures    PLAN      · Continue physical therapy and protocol: ABRILB - B UE  · Deep venous thrombosis prophylaxis - per primary service, early mobilization  · PT/OT  · Pain Control: IV and PO  · Bilateral CT with 3D recon  · Plan for outpatient management of his bilateral fractures  · Plan discussed with attending  · D/C Plan:  pending sw/cm, medicine, and therapy recommendations.  Ok to discharge from orthopedic standpoint once appropriate discharge plan is in place    Electronically signed by DO Em on 12/20/2021 at 7:00 AM

## 2021-12-20 NOTE — PROGRESS NOTES
[address]       TZUE:                                                  Patient Name: Alejo Doherty  MRN: [id number]  : 1970  Room: [address] [place] [address]    Evaluating OT: Luiza Chris, [place] #[id number]    Referring Provider[de-identified] April TERRIE Dueñas CNP     Specific Provider Orders/Date: OT evaluation and treatment 21    Diagnosis:  Hypoxia [R09.02]  Acute pain of both shoulders [M25.511, M25.512]  Witnessed seizure-like activity ([facility] [address] [R56.9]      Pertinent Medical History:  has a past medical history of DVT (deep venous thrombosis) ([facility]) and Lupus anticoagulant disorder ([facility] [address]        Precautions:  Fall Risk, B humeral head fractures, BUE NWB, seizure, awaiting clarification of AROM to BUE for ADLs (RN notified)    Assessment of current deficits   [x] Functional mobility   [x]ADLs  [x] Strength               []Cognition   [x] Functional transfers   [] IADLs         [x] Safety Awareness   [x]Endurance   [] Fine Coordination              [x] Balance      [] Vision/perception   []Sensation    [x]Gross Motor Coordination  [x] ROM  [] Delirium                   [] Motor Control     OT PLAN OF CARE   OT POC based on physician orders, patient diagnosis and results of clinical assessment    Frequency/Duration  2-5 days/wk for 2 weeks PRN   Specific OT Treatment to include:   * Instruction/training on adapted ADL techniques and AE recommendations to increase functional independence within precautions       * Functional transfer/mobility training/DME recommendations for increased independence, safety, and fall prevention  * Patient/Family education to increase follow through with safety techniques and functional independence  * Cognitive retraining/development of therapeutic activities to improve problem solving, judgement, memory, and attention for increased safety/participation in ADL/IADL tasks  * Therapeutic exercise to improve motor endurance, ROM, and functional strength for ADLs/functional transfers  * Therapeutic activities to facilitate/challenge dynamic balance, stand tolerance for increased safety and independence with ADLs  * Therapeutic activities to facilitate gross/fine motor skills for increased independence with ADLs  * Positioning to improve skin integrity, interaction with environment and functional independence      Recommended Adaptive Equipment:  TBD     Home Living:  Pt lives with wife and 2 sons (12 & 24) in a 2 story with 2 step(s) to enter and no rail(s); bed/bath on 1st   Bathroom setup: walk in shower   Equipment owned: none    Prior Level of Function: Independent  with ADLs , Independent  with IADLs; ambulated with no AD  Driving: yes      Pain Level: 5/10 BUE    Cognition: A&O: 4/4; flat affect,   Follows 2 step directions: good    Memory:  good    Sequencing:  good    Problem solving:  fair    Judgement/safety:  fair     Functional Assessment:   AM-PAC Daily Activity Raw Score: 14/24     Initial Eval Status  Date: 12/20/21 Treatment Status  Date: STG=LTG  Time frame: 10-14 days   Feeding Minimal Assist   To reach for objects on tray  Modified [place]    Grooming Minimal Assist   Modified [place]    UB Dressing Moderate Assist   Modified [place]    LB Dressing Moderate Assist   Rocael/doff socks  Modified [place]    Bathing Moderate Assist  D/t limited reach  Modified [place]    Toileting Minimal Assist   D/t limited AROM BUE  Modified [place]    Bed Mobility  Supine to sit: Moderate Assist with HOB elevated d/t NWB BUE  Sit to supine: Stand by Assist   Supine to sit:  Independent   Sit to supine: Independent    Functional Transfers Sit to stand: Stand by Assist   Stand to sit: Stand by Assist   Stand pivot: Stand by Assist   Independent    Functional Mobility SBA with no AD  Short distance in room  indep with no in bed with pillow/blanket roll supporting BUE         Rehab Potential: Good  for established goals     Patient / Family Goal:  Not stated    Patient and/or family were instructed on functional diagnosis, prognosis/goals and OT plan of care. Demonstrated fair understanding. Eval Complexity: Low    Time In: 11:05  Time Out: 11:30  Total Treatment Time: 10 minutes    Min Units   OT Eval Low 72520  x     OT Eval Medium 11686      OT Eval High 49773       OT Re-Eval [id number]       Therapeutic Ex 20168       Therapeutic Activities 96173      ADL/Self Care 56832  10 1   Orthotic Management 05561       Neuro Re-Ed 75306       Non-Billable Time          Evaluation Time includes thorough review of current medical information, gathering information on past medical history/social history and prior level of function, completion of standardized testing/informal observation of tasks, assessment of data and education on plan of care and goals.             [place], [place] #[id number]

## 2021-12-20 NOTE — PROGRESS NOTES
Subjective: The patient is awake and alert. Sitting up in bed, no acute complaints  Tells me he refused his Keppra yesterday because he does not understand why he needs to take a medication  Objective:    BP (!) 149/88   Pulse 74   Temp 96 °F (35.6 °C) (Temporal)   Resp 19   Ht 6' 3\" (1.905 m)   Wt 250 lb (113.4 kg)   SpO2 93%   BMI 31.25 kg/m²     In: 120 [P.O.:120]  Out: -   In: 120   Out: -     General appearance: NAD, conversant  HEENT: AT/NC, MMM  Neck: FROM, supple  Lungs: Clear to auscultation  CV: RRR, no MRGs  Vasc: Radial pulses 2+  Abdomen: Soft, non-tender; no masses or HSM  Extremities: No peripheral edema or digital cyanosis  Skin: no rash, lesions or ulcers  Psych: Alert and oriented to person, place and time  Neuro: Alert and interactive     Recent Labs     12/18/21  0645 12/18/21  1521 12/19/21  0454   WBC 7.8 10.7 5.6   HGB 11.8* 12.6 12.0*   HCT 38.5 39.4 38.1    207 205       Recent Labs     12/18/21  0645 12/18/21  1521 12/19/21  0454    140 140   K 4.1 3.7 4.2   * 106 104   CO2 18* 22 24   BUN 11 14 14   CREATININE 0.8 1.1 1.1   CALCIUM 7.3* 9.1 9.1       Assessment:    Principal Problem:    Acute respiratory failure with hypoxia (HCC)  Active Problems:    Partial idiopathic epilepsy with seizures of localized onset, not intractable, without status epilepticus (HCC)    Clotting disorder (HCC)    Chronic anticoagulation    Obesity (BMI 30-39. 9)    Hypoxia    Pancreatic lesion  Resolved Problems:    * No resolved hospital problems.  *      Plan:    Admit to telemetry for evaluation of seizures  Empiric antiepileptic-Keppra twice daily 500 twice daily, increase to 1000 twice daily-patient refused, encouraged to start taking today   EEG completed  MRI brain  Neurology evaluation appreciated  Neurochecks every hour  Seizure precautions     humeral head fracture bilateral without trauma  Unclear from CTA chest report if this is bilateral,-x-rays confirm probable fractures bilaterally  of unknown etiology  Pain control  Bilateral shoulder x-rays confirm above  Orthopedic evaluation as it is unclear why patient has bilateral humeral fractures without any trauma, actually to assess if these actually are fractures-slings in place  CT scans of bilateral shoulders confirm diagnosis bilateral 2 cm fracture segments of the anterior humeral head  Bilateral slings recommended however patient has refused this as well    History of lupus anticoagulant/thrombophilic state  D-dimer markedly elevated at 3000  Patient is already on Coumadin at home-continue maintain INR between 2.5 -3.5 secondary to history of valve replacement  Continue Coumadin therapy-INR therapeutic 2.7     Questionable pancreatic tail mass  Obtain MRI/MRCP abdomen to further assess for possible malignancy pending           DVT Prophylaxis   PT/OT  Discharge planning           Tiffani Wild MD  6:25 AM  12/20/2021

## 2021-12-20 NOTE — PROCEDURES
[address] Report    MRN: [id number]   PATIENT NAME: Aure Keys   DATE OF REPORT: 2021    DATE OF SERVICE: 2021    PHYSICIAN NAME: Shaylee Ulloa DO  Referring Physician: Rody Dueñas CNP      Patient's : 1970   Patient's Age: 46 y.o. Gender: male     PROCEDURE: Routine EEG with video      Clinical Interpretation: This was a normal study during waking and drowsiness. No seizures or epileptiform discharges were noted during this study. ____________________________  Electronically signed by: Shaylee Ulloa DO, 2021 12:06 PM      Patient Clinical Information   Reason for Study: Patient undergoing evaluation for new onset seizures  Patient State: Awake  Primary neurological diagnosis: New onset seizures   Primary indication for monitoring: Characterization of seizure disorder    Pertinent Medications and Treatments    levetiracetam    warfarin    Sedatives administered: No  Intubated: No  Pharmacological paralytic: No    Reporting Period  Start of Study: 1143, 2021   End of Study:  1208, 2021       EEG Description  Digital video and scalp EEG monitoring was performed using the standard protocol for this laboratory. Scalp electrodes were applied in the international 10/20 system. Multiple digital montage arrangements were utilized for evaluation. EKG and video were recorded. Background:      Occipital rhythm (posterior dominant rhythm or PDR): Present   Frequency: 10 Hz  Voltage: Medium   Organization: good   Reactivity to eye opening/closure: good    Drowsiness: Present - normal  Sleep: Absent    Technical and Activation Procedures:  Hyperventilation: Not done        Photic stimulation: Done - physiologic driving noted        Reactivity to stimulation: Yes    Abnormalities:    I. Seizures? No    II. Rhythmic or Periodic Patterns? No    III. Other Abnormalities?         No

## 2021-12-20 NOTE — PROGRESS NOTES
Pt complaining of 10/10 bilateral arm pain, gave new and more ice for patient, pt not due for any pain medications now, pt is asking for medication to help with his pain , message sent to ortho to notify

## 2021-12-20 NOTE — PROGRESS NOTES
Patient seen and evaluated again this afternoon. He has continued pain to the bilateral shoulders. He also has difficulty with motion. He denies any other changes in his symptoms. No numbness or tingling. No other areas of concern. Physical exam reveals limited to no external rotation both active and passive of the shoulder. Abduction and flexion is significantly limited actively, passively he tolerates approximately 50 degrees in both planes to bilateral shoulders. Radial pulse +2/4. No significant erythema or appreciable effusions of the shoulders. Compartments soft and compressible    Examination at this time is consistent with adhesive capsulitis. Findings on imaging are more likely to be chronic in nature. Recommend PT/OT. Weightbearing as tolerated for bilateral upper extremities.   Follow-up as an outpatient for continued management

## 2021-12-21 LAB
ALBUMIN SERPL-MCNC: 3.1 G/DL (ref 3.5–5.2)
ALP BLD-CCNC: 56 U/L (ref 40–129)
ALT SERPL-CCNC: 22 U/L (ref 0–40)
ANION GAP SERPL CALCULATED.3IONS-SCNC: 15 MMOL/L (ref 7–16)
AST SERPL-CCNC: 26 U/L (ref 0–39)
BASOPHILS ABSOLUTE: 0.03 E9/L (ref 0–0.2)
BASOPHILS RELATIVE PERCENT: 0.4 % (ref 0–2)
BILIRUB SERPL-MCNC: 0.4 MG/DL (ref 0–1.2)
BUN BLDV-MCNC: 19 MG/DL (ref 6–20)
CALCIUM SERPL-MCNC: 8.8 MG/DL (ref 8.6–10.2)
CEA: 0.6 NG/ML (ref 0–5.2)
CHLORIDE BLD-SCNC: 104 MMOL/L (ref 98–107)
CO2: 19 MMOL/L (ref 22–29)
CREAT SERPL-MCNC: 0.9 MG/DL (ref 0.7–1.2)
EOSINOPHILS ABSOLUTE: 0.07 E9/L (ref 0.05–0.5)
EOSINOPHILS RELATIVE PERCENT: 1 % (ref 0–6)
GFR AFRICAN AMERICAN: >60
GFR NON-AFRICAN AMERICAN: >60 ML/MIN/1.73
GLUCOSE BLD-MCNC: 140 MG/DL (ref 74–99)
HCT VFR BLD CALC: 35.2 % (ref 37–54)
HEMOGLOBIN: 11.3 G/DL (ref 12.5–16.5)
IMMATURE GRANULOCYTES #: 0.03 E9/L
IMMATURE GRANULOCYTES %: 0.4 % (ref 0–5)
INR BLD: 3.1
LYMPHOCYTES ABSOLUTE: 0.83 E9/L (ref 1.5–4)
LYMPHOCYTES RELATIVE PERCENT: 12.4 % (ref 20–42)
MCH RBC QN AUTO: 23.4 PG (ref 26–35)
MCHC RBC AUTO-ENTMCNC: 32.1 % (ref 32–34.5)
MCV RBC AUTO: 73 FL (ref 80–99.9)
MONOCYTES ABSOLUTE: 0.58 E9/L (ref 0.1–0.95)
MONOCYTES RELATIVE PERCENT: 8.7 % (ref 2–12)
NEUTROPHILS ABSOLUTE: 5.14 E9/L (ref 1.8–7.3)
NEUTROPHILS RELATIVE PERCENT: 77.1 % (ref 43–80)
PDW BLD-RTO: 16.5 FL (ref 11.5–15)
PLATELET # BLD: 161 E9/L (ref 130–450)
PMV BLD AUTO: 10.1 FL (ref 7–12)
POTASSIUM SERPL-SCNC: 3.8 MMOL/L (ref 3.5–5)
PROTHROMBIN TIME: 33.7 SEC (ref 9.3–12.4)
RBC # BLD: 4.82 E12/L (ref 3.8–5.8)
SODIUM BLD-SCNC: 138 MMOL/L (ref 132–146)
TOTAL PROTEIN: 6.6 G/DL (ref 6.4–8.3)
WBC # BLD: 6.7 E9/L (ref 4.5–11.5)

## 2021-12-21 PROCEDURE — 85610 PROTHROMBIN TIME: CPT

## 2021-12-21 PROCEDURE — 6370000000 HC RX 637 (ALT 250 FOR IP): Performed by: PSYCHIATRY & NEUROLOGY

## 2021-12-21 PROCEDURE — 6360000002 HC RX W HCPCS: Performed by: NURSE PRACTITIONER

## 2021-12-21 PROCEDURE — 36415 COLL VENOUS BLD VENIPUNCTURE: CPT

## 2021-12-21 PROCEDURE — 2580000003 HC RX 258: Performed by: NURSE PRACTITIONER

## 2021-12-21 PROCEDURE — 80053 COMPREHEN METABOLIC PANEL: CPT

## 2021-12-21 PROCEDURE — 6370000000 HC RX 637 (ALT 250 FOR IP): Performed by: STUDENT IN AN ORGANIZED HEALTH CARE EDUCATION/TRAINING PROGRAM

## 2021-12-21 PROCEDURE — 2060000000 HC ICU INTERMEDIATE R&B

## 2021-12-21 PROCEDURE — 97110 THERAPEUTIC EXERCISES: CPT

## 2021-12-21 PROCEDURE — 97530 THERAPEUTIC ACTIVITIES: CPT

## 2021-12-21 PROCEDURE — 6370000000 HC RX 637 (ALT 250 FOR IP): Performed by: NURSE PRACTITIONER

## 2021-12-21 PROCEDURE — 85025 COMPLETE CBC W/AUTO DIFF WBC: CPT

## 2021-12-21 PROCEDURE — 99222 1ST HOSP IP/OBS MODERATE 55: CPT | Performed by: TRANSPLANT SURGERY

## 2021-12-21 PROCEDURE — 82378 CARCINOEMBRYONIC ANTIGEN: CPT

## 2021-12-21 PROCEDURE — 86301 IMMUNOASSAY TUMOR CA 19-9: CPT

## 2021-12-21 RX ORDER — LEVETIRACETAM 500 MG/1
1000 TABLET ORAL 2 TIMES DAILY
Status: DISCONTINUED | OUTPATIENT
Start: 2021-12-26 | End: 2021-12-22 | Stop reason: HOSPADM

## 2021-12-21 RX ADMIN — Medication 10 ML: at 19:40

## 2021-12-21 RX ADMIN — OXYCODONE HYDROCHLORIDE 10 MG: 10 TABLET ORAL at 05:38

## 2021-12-21 RX ADMIN — ACETAMINOPHEN 650 MG: 325 TABLET ORAL at 13:50

## 2021-12-21 RX ADMIN — Medication 10 ML: at 08:38

## 2021-12-21 RX ADMIN — LEVETIRACETAM 500 MG: 500 TABLET, FILM COATED ORAL at 21:36

## 2021-12-21 RX ADMIN — DOXYCYCLINE HYCLATE 100 MG: 100 CAPSULE ORAL at 21:36

## 2021-12-21 RX ADMIN — DOXYCYCLINE HYCLATE 100 MG: 100 CAPSULE ORAL at 08:38

## 2021-12-21 RX ADMIN — WATER 1000 MG: 1 INJECTION INTRAMUSCULAR; INTRAVENOUS; SUBCUTANEOUS at 05:28

## 2021-12-21 RX ADMIN — DILTIAZEM HYDROCHLORIDE 120 MG: 120 CAPSULE, COATED, EXTENDED RELEASE ORAL at 21:36

## 2021-12-21 RX ADMIN — LEVETIRACETAM 500 MG: 500 TABLET, FILM COATED ORAL at 08:39

## 2021-12-21 RX ADMIN — WARFARIN SODIUM 7.5 MG: 7.5 TABLET ORAL at 18:12

## 2021-12-21 RX ADMIN — OXYCODONE HYDROCHLORIDE 10 MG: 10 TABLET ORAL at 21:35

## 2021-12-21 RX ADMIN — OXYCODONE 5 MG: 5 TABLET ORAL at 10:37

## 2021-12-21 RX ADMIN — OXYCODONE HYDROCHLORIDE 10 MG: 10 TABLET ORAL at 16:32

## 2021-12-21 RX ADMIN — METOPROLOL SUCCINATE 50 MG: 50 TABLET, EXTENDED RELEASE ORAL at 08:38

## 2021-12-21 ASSESSMENT — PAIN DESCRIPTION - ORIENTATION: ORIENTATION: RIGHT;LEFT

## 2021-12-21 ASSESSMENT — PAIN SCALES - GENERAL
PAINLEVEL_OUTOF10: 5
PAINLEVEL_OUTOF10: 8
PAINLEVEL_OUTOF10: 0
PAINLEVEL_OUTOF10: 10
PAINLEVEL_OUTOF10: 0
PAINLEVEL_OUTOF10: 5
PAINLEVEL_OUTOF10: 0
PAINLEVEL_OUTOF10: 3
PAINLEVEL_OUTOF10: 0
PAINLEVEL_OUTOF10: 8
PAINLEVEL_OUTOF10: 8
PAINLEVEL_OUTOF10: 6

## 2021-12-21 ASSESSMENT — PAIN DESCRIPTION - LOCATION
LOCATION: BACK
LOCATION: SHOULDER

## 2021-12-21 ASSESSMENT — PAIN - FUNCTIONAL ASSESSMENT: PAIN_FUNCTIONAL_ASSESSMENT: PREVENTS OR INTERFERES SOME ACTIVE ACTIVITIES AND ADLS

## 2021-12-21 ASSESSMENT — PAIN DESCRIPTION - PAIN TYPE: TYPE: ACUTE PAIN

## 2021-12-21 ASSESSMENT — ENCOUNTER SYMPTOMS
ABDOMINAL PAIN: 0
CHEST TIGHTNESS: 0
CHOKING: 0
ABDOMINAL DISTENTION: 0
EYE ITCHING: 0
EYE REDNESS: 0
BACK PAIN: 0
APNEA: 0

## 2021-12-21 ASSESSMENT — PAIN DESCRIPTION - DESCRIPTORS: DESCRIPTORS: ACHING;CONSTANT;DISCOMFORT

## 2021-12-21 NOTE — CARE COORDINATION
Care Coordination  The patient  Came to the ed for a seizure like activity while at home. His wife found him and she said it was a manifestation of his sleep apnea (?) Apparently similar episode a couple of months ago. The patient has a PMH of Thrombophilia and lupus. The patient was alert x 3 in the ed. The patient  was started on po keppra 500 mg po bid. Plan eeg and mri of the brain was essentially negative. The patient sustained a closed right and left proximal humerus fx plan rest and slings no surgical intervention. Per Dr Jennifer Quinn from neurology MRI of the abdomen was done today and it shows multiple liver lesions. Need to r.o metastatic disease. Per the patient he lives with his wife and 2 kids in a 2 stoery home with 2 steps to get into the house. He has no dme as he was Independent pta. His PCP is Henrry Petit DO and the pharmacy is [facility] [address]. Pt ampac 18/24 and he walked 100 feet with no ad sba. Ot ampac 14/24 and limited due to bilateral humerus fx and he is wbat james upper ext. The plan is to follow up out patient with dr Santhosh Coburn. He will need to be on Keppra 500 mg bid x 1 week and no driving for 6 months.  Plan is home with his wife I will follow

## 2021-12-21 NOTE — PLAN OF CARE
Plan of care    Patient lying in bed and wife at bedside. Discussed EEG results which were normal.  Addressed questions and concerns. No reported seizures. Advised no further Neurology workup. Will follow up in clinic OP.     Plan:  Continue Keppra 500 mg BID x 1 week, then increase to 1000 mg BID  Seizure precautions  No driving until seizure free for 6 months  Follow up in OP clinic  Neurology to sign off

## 2021-12-21 NOTE — PROGRESS NOTES
Department of Orthopedic Surgery  Resident Progress Note    CT evaluation of bilateral shoulders show that fractures are likely chronic in nature. Given patient's limited passive and active ROM his shoulder pain is also coming from probable concomitant adhesive capsulitis. PLAN      · Continue physical therapy and protocol: WBAT B UE  · Deep venous thrombosis prophylaxis - per primary service, early mobilization  · PT/OT focusing on bilateral shoulder ROM as tolerated by the patient  · Pain Control: per primary service  · Plan for outpatient management of his bilateral fractures  · Plan discussed with attending  · Follow up outpatient with Dr. Daniel Penn. Call for appointment. · D/C Plan:  pending sw/cm, medicine, and therapy recommendations. Ok to discharge from orthopedic standpoint once appropriate discharge plan is in place. Orthopedics will follow the patient peripherally for the remainder of their inpatient stay.     Electronically signed by Brianda Nicolas DO on 12/21/2021 at 9:27 AM

## 2021-12-21 NOTE — PROGRESS NOTES
A  Instructed on modified techniques  Modified [place]    LB Dressing Moderate Assist   Rocael/doff socks  Mod A  Using cross over technique Modified [place]    Bathing Moderate Assist  D/t limited reach Prince Jacobo العلي  Simulated task Modified [place]    Toileting Minimal Assist   D/t limited AROM BUE  NT Modified [place]    Bed Mobility  Supine to sit: Moderate Assist with HOB elevated d/t NWB BUE  Sit to supine: Stand by Assist   Mod A  Supine to sit   Encouraged pt to use gown  Supine to sit: Independent   Sit to supine: Independent    Functional Transfers Sit to stand: Stand by Assist   Stand to sit: Stand by Assist   Stand pivot: Stand by Assist  SBA   Independent    Functional Mobility SBA with no AD  Short distance in room SBA  From EOB to chair indep with no AD      Balance Sitting: indep     Standing: SBA Sitting: Indep  Standing: CGA  Sitting: indep      Standing: indep   Activity Tolerance Fair-  fair-   good   Visual/  Perceptual Glasses: yes             BUE  ROM/Strength/  Fine motor Coordination Hand dominance: R     RUE: ROM NT proximally, WFL distally     Strength: NT      Strength: fair     Coordination:  fair     LUE: ROM NT proximally,  WFL distally     Strength: NT      Strength: fair     Coordination: fair  pt using ice packs for support    Educated pt on shoulder shrugs & rolls, scapular movement & Ag Rain B UE with minimal ROM Pt will demo independence with self positioning of BUE to improve comfort and healing of BUE humerus fx.      Education:  Pt was educated through out treatment regarding proper technique & safety with bed mobility, functional transfers & mobility, ADL modifed techniques and to ease tasks, improve safety & prevent falls to return home safely. Educated pt & spouse on WBAT B UE per ortho & no restrictions with ROM. Instructed pt on shoulder shrugs, rolls & scapular retraction and completed gentle AAROM within tolerance with Fair results.  Also encouraged pt to walk hallways with spouse occasionally for increased activity. Comments: Upon arrival pt was in bed & agreeable for therapy, spouse present. At end of session pt was seated in chair, spouse still present, all lines and tubes intact, call light within reach. · Pt has made Fair progress towards set goals. Spoke with OTR regarding ortho recommendations WBAT B UE & no restrictions for B UE. OTR will assess pt next treatment. · Continue with current plan of care    Treatment Time In: 3:15            Treatment Time Out: 3:40           Treatment Charges: Mins Units   Ther Ex  67402 15 1   Manual Therapy 17177     Thera Activities 51177 8 1   ADL/Home Mgt 02309     Neuro Re-ed 98574     Group Therapy      Orthotic manage/training  06725     Non-Billable Time     Total Timed Treatment 25 2       Bessie SERRATO  [address] [place] [address], [address] [place] [address]

## 2021-12-21 NOTE — PLAN OF CARE
Problem: Pain:  Goal: Pain level will decrease  Description: Pain level will decrease  Outcome: Ongoing  Goal: Control of acute pain  Description: Control of acute pain  Outcome: Ongoing  Goal: Control of chronic pain  Description: Control of chronic pain  Outcome: Ongoing     Problem: Falls - Risk of:  Goal: Will remain free from falls  Description: Will remain free from falls  Outcome: Ongoing  Goal: Absence of physical injury  Description: Absence of physical injury  Outcome: Ongoing     Problem: Musculor/Skeletal Functional Status  Goal: Highest potential functional level  Outcome: Ongoing  Goal: Absence of falls  Outcome: Ongoing

## 2021-12-21 NOTE — CONSULTS
[facility]  Hematology/Oncology  Consult      Patient Name: Maile Apley  YOB: 1970  PCP: Azalia Yarbrough DO   Referring Provider:      Reason for Consultation:   Chief Complaint   Patient presents with    Shortness of Breath     Treated at [facility] ED this am with shoulder pain, and stiffness, was discharged arond 10am, and while laying down on the sofa , witnessed having the same type of stiffness, confused at the time, nohistory of seizures.  Shoulder Pain     shoulder discomfort started this am, ache, discomfort with liftening arms. History of Present Illness: This is a 57-year-old male patient with a PMH of thrombophilia with lupus anticoagulant follows with [facility], history of DVT, valve replacement on Coumadin therapy chronically who presented to the ED for evaluation of seizure-like activity at home. CTA head with no intracranial abnormality. CTA pulmonary with no PE. Redemonstrated pancreatic body lesion. Developed comminuted medial humeral head fractures. CT scans of bilateral shoulders confirm diagnosis bilateral 2 cm fracture segments of the anterior humeral head. MRI abdomen w and wo contrast MRCP showed multiple liver lesions only well seen on the initial post-contrast sequence and not well visualized on other sequences. The dominant 2 cm segment 8 lesion demonstrates only mild T2 hyperintense signal.  Only 1 lesion was apparent on prior CT from 2018. A nonenhancing cystic lesion along the anterior pancreatic body measuring up to 1.2 cm has mildly enlarged from 2018. Ortho surgery following for fractures and believe findings are chronic in nature recommending PT/OT. Neurology following. Patient started on Keppra. S/P EEG with no acute seizure seen. CBC with mild microcytic anemia and normal WBC and platelets. CMP stable with stable LFT's. CEA 0.6 and Ca 19-9 pending. Consultation for new liver lesions.      Diagnostic Data:     Past Medical History: Diagnosis Date    DVT (deep venous thrombosis) (HCC)     Lupus anticoagulant disorder [facility])        Patient Active Problem List    Diagnosis Date Noted    Pancreatic lesion 12/19/2021    Partial idiopathic epilepsy with seizures of localized onset, not intractable, without status epilepticus ([facility] [address] 12/18/2021    Clotting disorder ([facility] [address] 12/18/2021    Chronic anticoagulation 12/18/2021    Obesity (BMI 30-39.9) 12/18/2021    Hypoxia 12/18/2021    Acute respiratory failure with hypoxia ([facility] [address] 10/09/2021    Pneumonia 10/09/2021    History of DVT (deep vein thrombosis) 10/09/2021    Hx of aortic valve replacement 10/09/2021    Hyponatremia 10/09/2021        Past Surgical History:   Procedure Laterality Date    AORTIC VALVE REPLACEMENT  01/2019    CHOLECYSTECTOMY      HERNIA REPAIR      x 2       Family History  History reviewed. No pertinent family history. Social History    TOBACCO:   reports that he has never smoked. He has never used smokeless tobacco.  ETOH:   reports previous alcohol use. Home Medications  Prior to Admission medications    Medication Sig Start Date End Date Taking? Authorizing Provider   warfarin (COUMADIN) 7.5 MG tablet Take 7.5 mg by mouth Five times weekly on Sunday, Monday, Tuesday, Thursday and Friday   Yes Historical Provider, MD   dilTIAZem (CARDIZEM CD) 120 MG extended release capsule Take 120 mg by mouth daily   Yes Historical Provider, MD   metoprolol succinate (TOPROL XL) 25 MG extended release tablet Take 50 mg by mouth daily    Yes Historical Provider, MD   fluticasone (FLONASE) 50 MCG/ACT nasal spray 2 sprays in each nostril bid 9/17/19  Yes Rodrigo Montes,    warfarin (COUMADIN) 5 MG tablet Take 5 mg by mouth Twice a Week On Wednesday and Saturday   Yes Historical Provider, MD       Allergies  No Known Allergies    Review of Systems: Fatigued, bilateral shoulder pain and decreased mobilization.        Objective  BP (!) 155/89   Pulse 91   Temp 98.5 °F (36.9 °C) (Temporal)   Resp 18   Ht 6' 3\" (1.905 m)   Wt 250 lb 3.2 oz (113.5 kg)   SpO2 92%   BMI 31.27 kg/m²     Physical Exam:   Performance Status:  General: AAO to person, place, time, in no acute distress,   Head and neck : PERRLA, EOMI . Sclera non icteric. Oropharynx : Clear  Neck: no JVD,  no adenopathy  Heart: Regular rate and regular rhythm, no murmur  Lungs: Clear to auscultation   Extremities: No edema,no cyanosis, no clubbing. Shoulder pain on palpation  Abdomen: Soft, non-tender;no masses, no organomegaly  Skin:  No rash. Neurologic:Cranial nerves grossly intact. No focal motor or sensory deficits . Recent Laboratory Data-   Lab Results   Component Value Date    WBC 5.6 12/19/2021    HGB 12.0 (L) 12/19/2021    HCT 38.1 12/19/2021    MCV 75.3 (L) 12/19/2021     12/19/2021    LYMPHOPCT 18.1 (L) 12/19/2021    RBC 5.06 12/19/2021    MCH 23.7 (L) 12/19/2021    MCHC 31.5 (L) 12/19/2021    RDW 16.9 (H) 12/19/2021    NEUTOPHILPCT 71.7 12/19/2021    MONOPCT 8.2 12/19/2021    BASOPCT 0.4 12/19/2021    NEUTROABS 4.03 12/19/2021    LYMPHSABS 1.02 (L) 12/19/2021    MONOSABS 0.46 12/19/2021    EOSABS 0.07 12/19/2021    BASOSABS 0.02 12/19/2021       Lab Results   Component Value Date     12/19/2021    K 4.2 12/19/2021     12/19/2021    CO2 24 12/19/2021    BUN 14 12/19/2021    CREATININE 1.1 12/19/2021    GLUCOSE 115 (H) 12/19/2021    CALCIUM 9.1 12/19/2021    PROT 5.7 (L) 12/18/2021    LABALBU 3.2 (L) 12/18/2021    BILITOT <0.2 12/18/2021    ALKPHOS 48 12/18/2021    AST 18 12/18/2021    ALT 14 12/18/2021    LABGLOM >60 12/19/2021    GFRAA >60 12/19/2021       Lab Results   Component Value Date    FERRITIN 110 10/09/2021           Radiology-    XR SHOULDER RIGHT (MIN 2 VIEWS)    Result Date: 12/19/2021  EXAMINATION: THREE XRAY VIEWS OF THE RIGHT SHOULDER 12/19/2021 9:24 am COMPARISON: None.  HISTORY: ORDERING SYSTEM PROVIDED HISTORY: fracture TECHNOLOGIST PROVIDED HISTORY: Reason for exam:->fracture What reading provider will be dictating this exam?->CRC FINDINGS: Focal 1 cm calcified density identified adjacent to the inferior portion of the glenoid. Glenohumeral joint is normally aligned. No abnormal periarticular calcifications. [facility] joint is unremarkable in appearance. Visualized lung is unremarkable. 1 cm calcified fragment may represent a fracture fragment. CT Head WO Contrast    Result Date: 12/18/2021  EXAMINATION: CT OF THE HEAD WITHOUT CONTRAST  12/18/2021 7:03 am TECHNIQUE: CT of the head was performed without the administration of intravenous contrast. Dose modulation, iterative reconstruction, and/or weight based adjustment of the mA/kV was utilized to reduce the radiation dose to as low as reasonably achievable. COMPARISON: 10/09/2021 HISTORY: ORDERING SYSTEM PROVIDED HISTORY: altered TECHNOLOGIST PROVIDED HISTORY: Reason for exam:->altered Has a \"code stroke\" or \"stroke alert\" been called? ->No Decision Support Exception - unselect if not a suspected or confirmed emergency medical condition->Emergency Medical Condition (MA) What reading provider will be dictating this exam?->CRC FINDINGS: BRAIN/VENTRICLES: There is no acute intracranial hemorrhage, mass effect or midline shift. No abnormal extra-axial fluid collection. The gray-white differentiation is maintained without evidence of an acute infarct. There is no evidence of hydrocephalus. ORBITS: The visualized portion of the orbits demonstrate no acute abnormality. SINUSES: Small retention cysts or polyps at the base of each maxillary sinus. SOFT TISSUES/SKULL:  No acute abnormality of the visualized skull or soft tissues. No acute intracranial abnormality.  MRI would be useful if symptoms persist. RECOMMENDATIONS: Unavailable     CT SHOULDER LEFT WO CONTRAST    Result Date: 12/20/2021  EXAMINATION: CT OF THE LEFT SHOULDER WITHOUT CONTRAST; 3D RECONSTRUCTIONS 12/20/2021 8:59 am TECHNIQUE: CT of the left shoulder was performed without the administration of intravenous contrast.  Multiplanar reformatted images are provided for review. Dose modulation, iterative reconstruction, and/or weight based adjustment of the mA/kV was utilized to reduce the radiation dose to as low as reasonably achievable. ; 3D reconstructions were performed on a separate workstation. Dose modulation, iterative reconstruction, and/or weight based adjustment of the mA/kV was utilized to reduce the radiation dose to as low as reasonably achievable. COMPARISON: None. HISTORY ORDERING SYSTEM PROVIDED HISTORY: fracture/preoperative planning TECHNOLOGIST PROVIDED HISTORY: Reason for exam:->fracture/preoperative planning What reading provider will be dictating this exam?->CRC FINDINGS: Bones:   Focal fracture fragment measures 2.2 cm identified anterior to humeral head. There is a corresponding cortical defect in the anterior aspect of the humeral head, which appears to extend into the bicipital groove. The glenoid appears intact. There is no dislocation at the glenohumeral joint. Cortical joint appears intact. Soft Tissue:   Soft tissue edema possible joint effusion identified in the subscapularis and teres minor. Reversed Hill-Sachs fracture of the humeral head. The glenohumeral joint is not dislocated. Focal 2 cm fracture fragment anterior to the humeral head. RECOMMENDATIONS: Unavailable     CT SHOULDER RIGHT WO CONTRAST    Result Date: 12/20/2021  EXAMINATION: CT OF THE RIGHT SHOULDER WITHOUT CONTRAST 12/20/2021 8:59 am TECHNIQUE: CT of the right shoulder was performed without the administration of intravenous contrast.  Multiplanar reformatted images are provided for review. Dose modulation, iterative reconstruction, and/or weight based adjustment of the mA/kV was utilized to reduce the radiation dose to as low as reasonably achievable. COMPARISON: None.  HISTORY ORDERING SYSTEM PROVIDED HISTORY: fracture/preoperative planning TECHNOLOGIST PROVIDED HISTORY: Reason for exam:->fracture/preoperative planning What reading provider will be dictating this exam?->CRC FINDINGS: Bones: There is a 1.5 cm fracture fragment identified anterior/inferior to the glenohumeral joint. A corresponding cortical defect identified within the anterior aspect of the humeral head. The glenohumeral joint appears intact. Acromioclavicular joints intact. Soft Tissue: There is extensive soft tissue edema and joint effusion identified of the glenohumeral joint. Reverse Hill-Sachs fracture of the humeral head with focal 1.5 cm fracture fragment. No dislocation identified at the glenohumeral joint. RECOMMENDATIONS: Unavailable     XR SHOULDER LEFT (MIN 2 VIEWS)    Result Date: 12/19/2021  EXAMINATION: THREE XRAY VIEWS OF THE LEFT SHOULDER 12/19/2021 9:24 am COMPARISON: None. HISTORY: ORDERING SYSTEM PROVIDED HISTORY: fracture TECHNOLOGIST PROVIDED HISTORY: Reason for exam:->fracture What reading provider will be dictating this exam?->CRC FINDINGS: Limited evaluation due to nonstandard projections. 1.6 cm bony density identified adjacent to the glenoid. Acromioclavicular joint is intact no pneumothorax in the partially visualize left lung. No abnormal soft tissue edema. Possible fracture fragment adjacent to the glenoid. Evaluation mildly due to nonstandard projections     XR CHEST PORTABLE    Result Date: 12/18/2021  EXAMINATION: ONE XRAY VIEW OF THE CHEST 12/18/2021 6:38 am COMPARISON: 10/10/2021 HISTORY: ORDERING SYSTEM PROVIDED HISTORY: sob TECHNOLOGIST PROVIDED HISTORY: Reason for exam:->sob What reading provider will be dictating this exam?->CRC FINDINGS: EKG leads overlie the chest.  Sternotomy and valve replacement hardware. Cardiac silhouette is borderline prominent but unchanged. No pneumothorax. Interval improvement in aeration of the lungs with some patchy residual opacities most evident in the left greater than right mid to lower lung zone. No effusion. Patchy opacities may represent areas of residual scarring. Acute infiltrates from pneumonia not entirely excluded. Overall, improved aeration from previous. Short-term follow-up recommended if symptoms persist.     MRI ABDOMEN W WO CONTRAST MRCP    Result Date: 12/21/2021  EXAMINATION: MRI OF THE ABDOMEN WITH AND WITHOUT CONTRAST AND MRCP 12/20/2021 3:49 pm TECHNIQUE: Multiplanar multisequence MRI of the abdomen was performed without and with the administration of 20 mL ProHance intravenous contrast.  After initial T2 axial and coronal images, thick slab, thin slab and 3D coronal MRCP sequences were obtained without the administration of intravenous contrast.  MIP images are provided for review. COMPARISON: Chest CT on 10/09/2021, abdomen/pelvis CT on 11/30/2018 HISTORY: Enlarging pancreatic body lesion. FINDINGS: Image quality degraded by motion artifact. Along the anterior pancreatic body, there is a T2 hyperintense lesion measuring 1.2 x 0.8 x 0.8 cm which is in close proximity to the main pancreatic duct. No pancreatic ductal dilatation or pancreas divisum. No abnormal pancreatic enhancement. Liver demonstrates normal morphology without steatosis. On the initial post-contrast sequence, there are multiple hypoenhancing liver lesions with the largest lesions measuring up to 2 cm in the periphery of segment 8 and 1.7 cm in segment 5. These are inapparent on venous phase sequences. The dominant segment 8 lesion demonstrates only mild T2 hyperintense signal.  No definite restricted diffusion seen. Cholecystectomy. No significant biliary ductal dilatation. No intraductal filling defect. Chronic infarct along the medial spleen. No adrenal nodule. 3.3 cm left renal cyst.  Kidneys demonstrate symmetric enhancement without hydronephrosis. No upper abdominal lymphadenopathy or ascites. Visualized osseous structures and gastrointestinal tract are unremarkable. Abdominal aorta is normal caliber. Susceptible artifact from sternotomy changes. 1. Multiple liver lesions as described above are only well seen on the initial post-contrast sequence and not well visualized on other sequences. The dominant 2 cm segment 8 lesion demonstrates only mild T2 hyperintense signal.  Motion artifact limits evaluation. Only 1 lesion was apparent on prior CT from 2018. Given the multiplicity of lesions, metastatic disease is the primary concern. Recommend multiphase liver CT for further evaluation. 2. A nonenhancing cystic lesion along the anterior pancreatic body measuring up to 1.2 cm has mildly enlarged from 2018. This is favored to be benign, possibly a branch duct IPMN, and can be re-evaluated on follow-up MRI in 1 year. CTA PULMONARY W CONTRAST    Result Date: 12/18/2021  EXAMINATION: CTA OF THE CHEST 12/18/2021 3:23 pm TECHNIQUE: CTA of the chest was performed after the administration of intravenous contrast.  Multiplanar reformatted images are provided for review. MIP images are provided for review. Dose modulation, iterative reconstruction, and/or weight based adjustment of the mA/kV was utilized to reduce the radiation dose to as low as reasonably achievable. COMPARISON: None. HISTORY: ORDERING SYSTEM PROVIDED HISTORY: hypoxia TECHNOLOGIST PROVIDED HISTORY: Reason for exam:->hypoxia Decision Support Exception - unselect if not a suspected or confirmed emergency medical condition->Emergency Medical Condition (MA) FINDINGS: Pulmonary Arteries: Within limitations of artifacts such as motion and suboptimal contrast opacification, no definite filling defects of acute PE are seen Mediastinum: Aortic valve device present. Lungs/pleura: Greatly improved pulmonary aeration. Residual mild dependent density suggesting atelectasis with possibly incompletely cleared COVID pneumonia Upper Abdomen: Cholecystectomy. Left renal cyst is seen.   Adrenal glands are mildly thickened and possible nodule for example left-side on image 251 at 1.4 cm. Pancreatic body region low-density lesion is present approximately 1.6 cm size Soft Tissues/Bones: No acute bone or soft tissue abnormality. Post sternotomy. Old compression deformity at least is at T5 and T3. Proximal humerus deformities and fragmentation suggesting medial comminuted impaction fractures     1. No acute pulmonary embolus is identified within limitations 2. Pulmonary aeration is greatly improved 3. Redemonstrated pancreatic body lesion, recommend dedicated MRI or CT evaluation 4. Developed comminuted medial humeral head fractures     CT 3D RECONSTRUCTION    Result Date: 12/20/2021  EXAMINATION: 3D RECONSTRUCTIONS 12/20/2021 8:59 am TECHNIQUE: 3D reconstructions were performed on a separate workstation. Dose modulation, iterative reconstruction, and/or weight based adjustment of the mA/kV was utilized to reduce the radiation dose to as low as reasonably achievable. COMPARISON: None. HISTORY: ORDERING SYSTEM PROVIDED HISTORY: preoperative planning TECHNOLOGIST PROVIDED HISTORY: Reason for exam:->preoperative planning What reading provider will be dictating this exam?->CRC FINDINGS: 3D volumetric rendering of the left shoulder. Reversal/fractures seen on on CT images. Please referred to left shoulder CT report for further detail. RECOMMENDATIONS: Unavailable     CT 3D RECONSTRUCTION    Result Date: 12/20/2021  EXAMINATION: CT OF THE LEFT SHOULDER WITHOUT CONTRAST; 3D RECONSTRUCTIONS 12/20/2021 8:59 am TECHNIQUE: CT of the left shoulder was performed without the administration of intravenous contrast.  Multiplanar reformatted images are provided for review. Dose modulation, iterative reconstruction, and/or weight based adjustment of the mA/kV was utilized to reduce the radiation dose to as low as reasonably achievable. ; 3D reconstructions were performed on a separate workstation.  Dose modulation, iterative reconstruction, and/or weight based adjustment of the mA/kV was utilized to reduce the radiation dose to as low as reasonably achievable. COMPARISON: None. HISTORY ORDERING SYSTEM PROVIDED HISTORY: fracture/preoperative planning TECHNOLOGIST PROVIDED HISTORY: Reason for exam:->fracture/preoperative planning What reading provider will be dictating this exam?->CRC FINDINGS: Bones:   Focal fracture fragment measures 2.2 cm identified anterior to humeral head. There is a corresponding cortical defect in the anterior aspect of the humeral head, which appears to extend into the bicipital groove. The glenoid appears intact. There is no dislocation at the glenohumeral joint. Cortical joint appears intact. Soft Tissue:   Soft tissue edema possible joint effusion identified in the subscapularis and teres minor. Reversed Hill-Sachs fracture of the humeral head. The glenohumeral joint is not dislocated. Focal 2 cm fracture fragment anterior to the humeral head. RECOMMENDATIONS: Unavailable     MRI BRAIN W WO CONTRAST    Result Date: 12/19/2021  EXAMINATION: MRI OF THE BRAIN WITHOUT AND WITH CONTRAST  12/19/2021 8:20 am TECHNIQUE: Multiplanar multisequence MRI of the head/brain was performed without and with the administration of intravenous contrast. COMPARISON: CT head 12/18/2021 HISTORY: ORDERING SYSTEM PROVIDED HISTORY: new onset seizures - epilepsy protocol TECHNOLOGIST PROVIDED HISTORY: Reason for exam:->new onset seizures - epilepsy protocol What reading provider will be dictating this exam?->CRC FINDINGS: Ischemia: No restricted diffusion is seen to suggest ischemia. Parenchyma: Punctate focus of T1 and T2 hypointensity in the left occipital periventricular white matter, with susceptibility artifact on GRE sequence. Focal volume loss of the left superior parietal lobule, without evidence of gliosis. No typical white matter hyperintensities of systemic lupus erythematosus. Unremarkable signal intensity and morphology of the bilateral hippocampal formations.  No abnormal intracranial enhancement is seen. Ventricles: No evidence of hydrocephalus. Flow voids: Flow voids are present in the proximal major intracranial arteries. Recent     Old micro bleed in the left occipital periventricular white matter. Left parietal lobe volume loss No typical white matter hyperintensities of systemic lupus erythematosus. No evidence of recent infarct, intracranial hemorrhage, mass effect, or hydrocephalus. No abnormal intracranial enhancement. ASSESSMENT/PLAN :  12-year-old male   Thrombophilia with lupus anticoagulant follows with [facility], history of DVT, valve replacement on Coumadin therapy chronically   New liver lesions on scans      - CTA head with no intracranial abnormality. CTA pulmonary with no PE. Redemonstrated pancreatic body lesion. Developed comminuted medial humeral head fractures. CT scans of bilateral shoulders confirm diagnosis bilateral 2 cm fracture segments of the anterior humeral head   - MRI abdomen w and wo contrast MRCP showed multiple liver lesions only well seen on the initial post-contrast sequence and not well visualized on other sequences. The dominant 2 cm segment 8 lesion demonstrates only mild T2 hyperintense signal.  Only 1 lesion was apparent on prior CT from 2018. A nonenhancing cystic lesion along the anterior pancreatic body measuring up to 1.2 cm has mildly enlarged from 2018  - Ortho surgery following for fractures and believe findings are chronic in nature recommending PT/OT. - Neurology following for new seizure like activity. Patient started on Keppra. S/P EEG with no acute seizure seen  - CBC with mild microcytic anemia and normal WBC and platelets. CMP essentially normal as are LFT's. CEA 0.6 and Ca 19-9 pending  - Will ask Dr. Yael Crane and [facility] to take a look at scans. The pancreatic tail lesion is only slightly enlarged and at 1.2 cm, even if IPMN, would not require resection (gray zone between 1-2 cm).  Liver lesions are not definitively

## 2021-12-21 NOTE — PROGRESS NOTES
Subjective: The patient is awake and alert. Sitting up in bed, no acute complaints  Complaining of all the issues that he currently has and seems very frustrated and unhappy  Mainly he just wants to be discharged as soon as possible  Objective:    BP (!) 155/89   Pulse 91   Temp 98.5 °F (36.9 °C) (Temporal)   Resp 18   Ht 6' 3\" (1.905 m)   Wt 250 lb 3.2 oz (113.5 kg)   SpO2 92%   BMI 31.27 kg/m²     No intake/output data recorded. No intake/output data recorded. General appearance: NAD, conversant  HEENT: AT/NC, MMM  Neck: FROM, supple  Lungs: Clear to auscultation  CV: RRR, no MRGs  Vasc: Radial pulses 2+  Abdomen: Soft, non-tender; no masses or HSM  Extremities: No peripheral edema or digital cyanosis  Skin: no rash, lesions or ulcers  Psych: Alert and oriented to person, place and time  Neuro: Alert and interactive     Recent Labs     12/18/21  1521 12/19/21  0454   WBC 10.7 5.6   HGB 12.6 12.0*   HCT 39.4 38.1    205       Recent Labs     12/18/21  1521 12/19/21  0454    140   K 3.7 4.2    104   CO2 22 24   BUN 14 14   CREATININE 1.1 1.1   CALCIUM 9.1 9.1       Assessment:    Principal Problem:    Acute respiratory failure with hypoxia (HCC)  Active Problems:    Partial idiopathic epilepsy with seizures of localized onset, not intractable, without status epilepticus (HCC)    Clotting disorder (HCC)    Chronic anticoagulation    Obesity (BMI 30-39. 9)    Hypoxia    Pancreatic lesion  Resolved Problems:    * No resolved hospital problems.  *      Plan:    Admit to telemetry for evaluation of seizures  Empiric antiepileptic-Keppra twice daily 500 twice daily, increase to 1000 twice daily-patient refused, encouraged to start taking today   EEG completed  MRI brain  Neurology evaluation appreciated  Neurochecks every hour  Seizure precautions     humeral head fracture bilateral without trauma  Unclear from CTA chest report if this is bilateral,-x-rays confirm probable fractures bilaterally  of unknown etiology  Pain control  Bilateral shoulder x-rays confirm above  Orthopedic evaluation as it is unclear why patient has bilateral humeral fractures without any trauma, actually to assess if these actually are fractures-slings in place  CT scans of bilateral shoulders confirm diagnosis bilateral 2 cm fracture segments of the anterior humeral head  Bilateral slings recommended however patient has refused this as well    History of lupus anticoagulant/thrombophilic state  D-dimer markedly elevated at 3000  Patient is already on Coumadin at home-continue maintain INR between 2.5 -3.5 secondary to history of valve replacement  Continue Coumadin therapy-INR therapeutic 2.7     Questionable pancreatic tail mass  Obtain MRI/MRCP abdomen to further assess for possible malignancy pending  MRI has been completed and reveals multiple lesions on the liver consistent with probable metastatic disease  Check tumor markers including CA [id number]-[id number] and CEA-unremarkable  Consult oncology for further recommendations-most likely further work-up can be done as an outpatient  He does follow with hematology in [place] for his underlying lupus anticoagulant/thrombophilia state  He will likely need a biopsy of 1 of these liver lesions-interventional radiology can do as outpatient  Coumadin will need to be held as currently he is therapeutically anticoagulated    Patient is extremely anxious for discharge, will plan discharge once seen by oncology team today     DVT Prophylaxis   PT/OT  Discharge planning           Rudy Akbar MD  8:50 AM  12/21/2021

## 2021-12-21 NOTE — CONSULTS
Hepatobiliary and Pancreatic Surgery   Consult Note     Patient's Name/Date of Birth: Merrill Fisher /1970 (21 y.o.)    Date: December 21, 2021     CC: New liver lesions, known pancreatic lesion    HPI:  Six 57-year-old male presenting with seizure-like activity. Patient has a past medical history of thrombocytopenia with lupus anticoagulation, history of DVT, history of valve replacement on Xarelto. He was told by his wife that he was found shaking but does not remember the episode himself. In the chart there is possibly this patient had an episode multiple months ago. Work-up revealed known pancreatic lesion as well as new and enlarging liver lesions. Also showed possible bilateral shoulder fractures. Currently patient is sitting up in bed tolerating his diet.   Patient also endorses having passed gas and has been in the hospital.    Past Medical History:   Diagnosis Date    DVT (deep venous thrombosis) ([facility] [address]     Lupus anticoagulant disorder (HCC)        Past Surgical History:   Procedure Laterality Date    AORTIC VALVE REPLACEMENT  01/2019    CHOLECYSTECTOMY      HERNIA REPAIR      x 2       Current Facility-Administered Medications   Medication Dose Route Frequency Provider Last Rate Last Admin    [START ON 12/26/2021] levETIRAcetam (KEPPRA) tablet 1,000 mg  1,000 mg Oral BID Kylah Prior, APRN - CNP        doxycycline hyclate (VIBRAMYCIN) capsule 100 mg  100 mg Oral 2 times per day April TRERIE Dueñas CNP   100 mg at 12/21/21 2273    oxyCODONE (ROXICODONE) immediate release tablet 5 mg  5 mg Oral Q4H PRN Levy Craneer, DO   5 mg at 12/21/21 1037    Or    oxyCODONE HCl (OXY-IR) immediate release tablet 10 mg  10 mg Oral Q4H PRN Levy Craneer, DO   10 mg at 12/21/21 1632    cefTRIAXone (ROCEPHIN) 1,000 mg in sterile water 10 mL IV syringe  1,000 mg IntraVENous Q24H April TERRIE Dueñas CNP   1,000 mg at 12/21/21 0528    levETIRAcetam (KEPPRA) tablet 500 mg  500 mg Oral BID Juancarlos Moralez APRN - CNP   500 mg at 12/21/21 6672    sodium chloride flush 0.9 % injection 5-40 mL  5-40 mL IntraVENous 2 times per day April Spenser APRN - CNP   10 mL at 12/21/21 2706    sodium chloride flush 0.9 % injection 5-40 mL  5-40 mL IntraVENous PRN April Spenser, APRN - CNP        0.9 % sodium chloride infusion  25 mL IntraVENous PRN April Sepnser, APRN - CNP        ondansetron (ZOFRAN-ODT) disintegrating tablet 4 mg  4 mg Oral Q8H PRN April Spenser, APRN - KENZIE        Or    ondansetron [facility]) injection 4 mg  4 mg IntraVENous Q6H PRN April Spenser, APRN - CNP        polyethylene glycol (GLYCOLAX) packet 17 g  17 g Oral Daily PRN April Spenser, APRN - CNP        acetaminophen (TYLENOL) tablet 650 mg  650 mg Oral Q6H PRN April Spenser APRN - CNP   650 mg at 12/21/21 1350    Or    acetaminophen (TYLENOL) suppository 650 mg  650 mg Rectal Q6H PRN April Spenser APRN - CNP        ipratropium-albuterol (DUONEB) nebulizer solution 1 ampule  1 ampule Inhalation Q6H PRN April Spenser APRN - CNP        fluticasone (FLONASE) 50 MCG/ACT nasal spray 2 spray  2 spray Each Nostril BID April Spenser, APRN - CNP        dilTIAZem (CARDIZEM CD) extended release capsule 120 mg  120 mg Oral Nightly April Spenser APRN - CNP   120 mg at 12/20/21 2040    metoprolol succinate (TOPROL XL) extended release tablet 50 mg  50 mg Oral Daily April Spenser APRN - CNP   50 mg at 12/21/21 3106    warfarin (COUMADIN) tablet 5 mg  5 mg Oral Once per day on Wed Sat April Spenser APRN - CNP   5 mg at 12/19/21 6633    warfarin (COUMADIN) tablet 7.5 mg  7.5 mg Oral Once per day on Sun Mon Tue Thu Fri April TERRIE Dueñas CNP   7.5 mg at 12/20/21 1810       No Known Allergies    History reviewed. No pertinent family history.     Social History     Socioeconomic History    Marital status:      Spouse name: Not on file    Number of children: Not on file    Years of education: Not on file    Highest education level: Not on file   Occupational History    Not on file   Tobacco Use    Smoking status: Never Smoker    Smokeless tobacco: Never Used   Vaping Use    Vaping Use: Never used   Substance and Sexual Activity    Alcohol use: Not Currently    Drug use: Never    Sexual activity: Not Currently   Other Topics Concern    Not on file   Social History Narrative    Not on file     Social Determinants of Health     Financial Resource Strain:     Difficulty of Paying Living Expenses: Not on file   Food Insecurity:     Worried About Running Out of Food in the Last Year: Not on file    Dave of Food in the Last Year: Not on file   Transportation Needs:     Lack of Transportation (Medical): Not on file    Lack of Transportation (Non-Medical): Not on file   Physical Activity:     Days of Exercise per Week: Not on file    Minutes of Exercise per Session: Not on file   Stress:     Feeling of Stress : Not on file   Social Connections:     Frequency of Communication with Friends and Family: Not on file    Frequency of Social Gatherings with Friends and Family: Not on file    Attends [employer] Services: Not on file    Active Member of [address] [place] [address] [employer] or Organizations: Not on file    Attends Club or Organization Meetings: Not on file    Marital Status: Not on file   Intimate Partner Violence:     Fear of Current or Ex-Partner: Not on file    Emotionally Abused: Not on file    Physically Abused: Not on file    Sexually Abused: Not on file   Housing Stability:     Unable to Pay for Housing in the Last Year: Not on file    Number of [place] in the Last Year: Not on file    Unstable Housing in the Last Year: Not on file       ROS:   Review of Systems   Constitutional: Positive for activity change. Negative for appetite change and chills.    HENT: Negative for congestion, dental problem and drooling. Eyes: Negative for redness and itching. Respiratory: Negative for apnea, choking and chest tightness. Cardiovascular: Negative for chest pain and palpitations. Gastrointestinal: Negative for abdominal distention and abdominal pain. Endocrine: Negative for cold intolerance. Genitourinary: Negative for flank pain and frequency. Musculoskeletal: Negative for arthralgias, back pain and gait problem. Allergic/Immunologic: Negative for environmental allergies and food allergies. Neurological: Positive for headaches. Negative for dizziness. Hematological: Negative for adenopathy. Does not bruise/bleed easily. Psychiatric/Behavioral: Negative for agitation and behavioral problems. Physical Exam:  Vitals:    12/21/21 1451   BP: 139/79   Pulse: 78   Resp: 16   Temp: 98.4 °F (36.9 °C)   SpO2: 92%       PSYCH: mood and affect normal, alert and oriented x 3: in mild apparent distress, comfortable  EYES: Sclera white,   ENMT:  Hearing normal, trachea midline, ears externally intact  LYMPH: no obvious lympadenopathy in neck. RESP: Respiratory effort was normal with no retractions or use of accessory muscles. CV:  No pedal edema  GI/ Abdomen: Soft, nondistended, tender, no guarding, no peritoneal signs  MSK: no clubbing/ no cyanosis/ gaitnormal       Assessment/Plan:  Evident on CT imaging of new liver lesions (subcentimeter), pancreatic cystic lesion  Thrombocytopenia with lupus anticoagulation, history of DVTs and valve replacement on Coumadin    Patient seen and examined, MRI of abdomen showing multiple liver lesions are not well visualized with some mild enhancement in the T2.   Cystic mass in pancreatic tail measuring 1.2 cm imaging was also reviewed for pancreatic lesion that appears stable    CEA 0.6, CA 19-9 still pending  Neurology following for possible new onset seizure activity  Chromogranin A needs drawn  Will plan for outpatient PetCT - indeterminant masses in his liver.  Not worried about the cystic mass in the pancreas.     Electronically signed by Dalia Joshi MD on 12/22/2021 at 12:36 PM

## 2021-12-22 ENCOUNTER — APPOINTMENT (OUTPATIENT)
Dept: CT IMAGING | Age: 51
DRG: 100 | End: 2021-12-22
Payer: COMMERCIAL

## 2021-12-22 VITALS
DIASTOLIC BLOOD PRESSURE: 98 MMHG | WEIGHT: 236.6 LBS | RESPIRATION RATE: 18 BRPM | OXYGEN SATURATION: 92 % | BODY MASS INDEX: 29.42 KG/M2 | HEIGHT: 75 IN | HEART RATE: 96 BPM | SYSTOLIC BLOOD PRESSURE: 143 MMHG | TEMPERATURE: 96.6 F

## 2021-12-22 LAB
EKG ATRIAL RATE: 62 BPM
EKG P AXIS: 63 DEGREES
EKG P-R INTERVAL: 160 MS
EKG Q-T INTERVAL: 436 MS
EKG QRS DURATION: 104 MS
EKG QTC CALCULATION (BAZETT): 442 MS
EKG R AXIS: 45 DEGREES
EKG T AXIS: 30 DEGREES
EKG VENTRICULAR RATE: 62 BPM
INR BLD: 2.8
PROTHROMBIN TIME: 31.2 SEC (ref 9.3–12.4)

## 2021-12-22 PROCEDURE — 86316 IMMUNOASSAY TUMOR OTHER: CPT

## 2021-12-22 PROCEDURE — 85610 PROTHROMBIN TIME: CPT

## 2021-12-22 PROCEDURE — 6370000000 HC RX 637 (ALT 250 FOR IP): Performed by: NURSE PRACTITIONER

## 2021-12-22 PROCEDURE — 6360000002 HC RX W HCPCS: Performed by: NURSE PRACTITIONER

## 2021-12-22 PROCEDURE — 6370000000 HC RX 637 (ALT 250 FOR IP): Performed by: STUDENT IN AN ORGANIZED HEALTH CARE EDUCATION/TRAINING PROGRAM

## 2021-12-22 PROCEDURE — 74175 CTA ABDOMEN W/CONTRAST: CPT

## 2021-12-22 PROCEDURE — 6360000004 HC RX CONTRAST MEDICATION: Performed by: RADIOLOGY

## 2021-12-22 PROCEDURE — 36415 COLL VENOUS BLD VENIPUNCTURE: CPT

## 2021-12-22 PROCEDURE — 93010 ELECTROCARDIOGRAM REPORT: CPT | Performed by: INTERNAL MEDICINE

## 2021-12-22 PROCEDURE — 2580000003 HC RX 258: Performed by: NURSE PRACTITIONER

## 2021-12-22 PROCEDURE — 2580000003 HC RX 258: Performed by: RADIOLOGY

## 2021-12-22 RX ORDER — LEVETIRACETAM 500 MG/1
500 TABLET ORAL 2 TIMES DAILY
Qty: 10 TABLET | Refills: 0 | Status: SHIPPED | OUTPATIENT
Start: 2021-12-22 | End: 2022-02-07

## 2021-12-22 RX ORDER — LEVETIRACETAM 1000 MG/1
1000 TABLET ORAL 2 TIMES DAILY
Qty: 60 TABLET | Refills: 3 | Status: SHIPPED | OUTPATIENT
Start: 2021-12-26 | End: 2022-02-21 | Stop reason: SDUPTHER

## 2021-12-22 RX ORDER — SODIUM CHLORIDE 0.9 % (FLUSH) 0.9 %
10 SYRINGE (ML) INJECTION PRN
Status: DISCONTINUED | OUTPATIENT
Start: 2021-12-22 | End: 2021-12-22 | Stop reason: HOSPADM

## 2021-12-22 RX ORDER — TRAMADOL HYDROCHLORIDE 50 MG/1
50 TABLET ORAL EVERY 4 HOURS PRN
Qty: 18 TABLET | Refills: 0 | Status: SHIPPED | OUTPATIENT
Start: 2021-12-22 | End: 2021-12-25

## 2021-12-22 RX ADMIN — LEVETIRACETAM 500 MG: 500 TABLET, FILM COATED ORAL at 09:44

## 2021-12-22 RX ADMIN — OXYCODONE HYDROCHLORIDE 10 MG: 10 TABLET ORAL at 14:14

## 2021-12-22 RX ADMIN — DOXYCYCLINE HYCLATE 100 MG: 100 CAPSULE ORAL at 09:43

## 2021-12-22 RX ADMIN — Medication 10 ML: at 09:44

## 2021-12-22 RX ADMIN — OXYCODONE HYDROCHLORIDE 10 MG: 10 TABLET ORAL at 02:15

## 2021-12-22 RX ADMIN — ACETAMINOPHEN 650 MG: 325 TABLET ORAL at 05:32

## 2021-12-22 RX ADMIN — OXYCODONE HYDROCHLORIDE 10 MG: 10 TABLET ORAL at 07:37

## 2021-12-22 RX ADMIN — METOPROLOL SUCCINATE 50 MG: 50 TABLET, EXTENDED RELEASE ORAL at 09:44

## 2021-12-22 RX ADMIN — WATER 1000 MG: 1 INJECTION INTRAMUSCULAR; INTRAVENOUS; SUBCUTANEOUS at 05:34

## 2021-12-22 RX ADMIN — IOPAMIDOL 90 ML: 755 INJECTION, SOLUTION INTRAVENOUS at 08:57

## 2021-12-22 RX ADMIN — Medication 10 ML: at 08:57

## 2021-12-22 ASSESSMENT — PAIN SCALES - GENERAL
PAINLEVEL_OUTOF10: 0
PAINLEVEL_OUTOF10: 8
PAINLEVEL_OUTOF10: 6
PAINLEVEL_OUTOF10: 10
PAINLEVEL_OUTOF10: 8
PAINLEVEL_OUTOF10: 8
PAINLEVEL_OUTOF10: 7
PAINLEVEL_OUTOF10: 0
PAINLEVEL_OUTOF10: 3
PAINLEVEL_OUTOF10: 2
PAINLEVEL_OUTOF10: 0

## 2021-12-22 ASSESSMENT — PAIN DESCRIPTION - PAIN TYPE: TYPE: ACUTE PAIN

## 2021-12-22 ASSESSMENT — PAIN DESCRIPTION - ORIENTATION
ORIENTATION: RIGHT;LEFT
ORIENTATION: RIGHT;LEFT

## 2021-12-22 ASSESSMENT — PAIN - FUNCTIONAL ASSESSMENT: PAIN_FUNCTIONAL_ASSESSMENT: PREVENTS OR INTERFERES SOME ACTIVE ACTIVITIES AND ADLS

## 2021-12-22 ASSESSMENT — PAIN DESCRIPTION - DESCRIPTORS
DESCRIPTORS: ACHING;CONSTANT
DESCRIPTORS: ACHING;CONSTANT

## 2021-12-22 ASSESSMENT — PAIN DESCRIPTION - LOCATION
LOCATION: SHOULDER
LOCATION: SHOULDER

## 2021-12-22 NOTE — PROGRESS NOTES
[facility]  Hematology/Oncology  Consult      Patient Name: Robinson Weinberg  YOB: 1970  PCP: Lien Pollard DO   Referring Provider:      Reason for Consultation:   Chief Complaint   Patient presents with    Shortness of Breath     Treated at [facility] ED this am with shoulder pain, and stiffness, was discharged arond 10am, and while laying down on the sofa , witnessed having the same type of stiffness, confused at the time, nohistory of seizures.  Shoulder Pain     shoulder discomfort started this am, ache, discomfort with liftening arms. Subjective: to be discharged soon. Pain in shoulders otherwise no other physical complaints at this time. 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 CTA head with no intracranial abnormality. CTA pulmonary with no PE. Redemonstrated pancreatic body lesion. Developed comminuted medial humeral head fractures. CT scans of bilateral shoulders confirm diagnosis bilateral 2 cm fracture segments of the anterior humeral head. MRI abdomen w and wo contrast MRCP showed multiple liver lesions only well seen on the initial post-contrast sequence and not well visualized on other sequences. The dominant 2 cm segment 8 lesion demonstrates only mild T2 hyperintense signal.  Only 1 lesion was apparent on prior CT from 2018. A nonenhancing cystic lesion along the anterior pancreatic body measuring up to 1.2 cm has mildly enlarged from 2018. Ortho surgery following for fractures and believe findings are chronic in nature recommending PT/OT. Neurology following. Patient started on Keppra. S/P EEG with no acute seizure seen. CBC with mild microcytic anemia and normal WBC and platelets. CMP stable with stable LFT's.  CEA shoulder pain and decreased mobilization. Objective  /84   Pulse 82   Temp 97.1 °F (36.2 °C) (Temporal)   Resp 20   Ht 6' 3\" (1.905 m)   Wt 236 lb 9.6 oz (107.3 kg)   SpO2 92%   BMI 29.57 kg/m²     Physical Exam:   Performance Status:  General: AAO to person, place, time, in no acute distress,   Head and neck : PERRLA, EOMI . Sclera non icteric. Oropharynx : Clear  Neck: no JVD,  no adenopathy  Heart: Regular rate and regular rhythm, no murmur  Lungs: Clear to auscultation   Extremities: No edema,no cyanosis, no clubbing. Shoulder pain on palpation  Abdomen: Soft, non-tender;no masses, no organomegaly  Skin:  No rash. Neurologic:Cranial nerves grossly intact. No focal motor or sensory deficits . Recent Laboratory Data-   Lab Results   Component Value Date    WBC 6.7 12/21/2021    HGB 11.3 (L) 12/21/2021    HCT 35.2 (L) 12/21/2021    MCV 73.0 (L) 12/21/2021     12/21/2021    LYMPHOPCT 12.4 (L) 12/21/2021    RBC 4.82 12/21/2021    MCH 23.4 (L) 12/21/2021    MCHC 32.1 12/21/2021    RDW 16.5 (H) 12/21/2021    NEUTOPHILPCT 77.1 12/21/2021    MONOPCT 8.7 12/21/2021    BASOPCT 0.4 12/21/2021    NEUTROABS 5.14 12/21/2021    LYMPHSABS 0.83 (L) 12/21/2021    MONOSABS 0.58 12/21/2021    EOSABS 0.07 12/21/2021    BASOSABS 0.03 12/21/2021       Lab Results   Component Value Date     12/21/2021    K 3.8 12/21/2021     12/21/2021    CO2 19 (L) 12/21/2021    BUN 19 12/21/2021    CREATININE 0.9 12/21/2021    GLUCOSE 140 (H) 12/21/2021    CALCIUM 8.8 12/21/2021    PROT 6.6 12/21/2021    LABALBU 3.1 (L) 12/21/2021    BILITOT 0.4 12/21/2021    ALKPHOS 56 12/21/2021    AST 26 12/21/2021    ALT 22 12/21/2021    LABGLOM >60 12/21/2021    GFRAA >60 12/21/2021       Lab Results   Component Value Date    FERRITIN 110 10/09/2021           Radiology-    XR SHOULDER RIGHT (MIN 2 VIEWS)    Result Date: 12/19/2021  EXAMINATION: THREE XRAY VIEWS OF THE RIGHT SHOULDER 12/19/2021 9:24 am COMPARISON: None. HISTORY: ORDERING SYSTEM PROVIDED HISTORY: fracture TECHNOLOGIST PROVIDED HISTORY: Reason for exam:->fracture What reading provider will be dictating this exam?->CRC FINDINGS: Focal 1 cm calcified density identified adjacent to the inferior portion of the glenoid. Glenohumeral joint is normally aligned. No abnormal periarticular calcifications. The [facility] joint is unremarkable in appearance. Visualized lung is unremarkable. 1 cm calcified fragment may represent a fracture fragment. CT Head WO Contrast    Result Date: 12/18/2021  EXAMINATION: CT OF THE HEAD WITHOUT CONTRAST  12/18/2021 7:03 am TECHNIQUE: CT of the head was performed without the administration of intravenous contrast. Dose modulation, iterative reconstruction, and/or weight based adjustment of the mA/kV was utilized to reduce the radiation dose to as low as reasonably achievable. COMPARISON: 10/09/2021 HISTORY: ORDERING SYSTEM PROVIDED HISTORY: altered TECHNOLOGIST PROVIDED HISTORY: Reason for exam:->altered Has a \"code stroke\" or \"stroke alert\" been called? ->No Decision Support Exception - unselect if not a suspected or confirmed emergency medical condition->Emergency Medical Condition (MA) What reading provider will be dictating this exam?->CRC FINDINGS: BRAIN/VENTRICLES: There is no acute intracranial hemorrhage, mass effect or midline shift. No abnormal extra-axial fluid collection. The gray-white differentiation is maintained without evidence of an acute infarct. There is no evidence of hydrocephalus. ORBITS: The visualized portion of the orbits demonstrate no acute abnormality. SINUSES: Small retention cysts or polyps at the base of each maxillary sinus. SOFT TISSUES/SKULL:  No acute abnormality of the visualized skull or soft tissues. No acute intracranial abnormality.  MRI would be useful if symptoms persist. RECOMMENDATIONS: Unavailable     CT SHOULDER LEFT WO CONTRAST    Result Date: 12/20/2021  EXAMINATION: CT OF THE LEFT SHOULDER WITHOUT CONTRAST; 3D RECONSTRUCTIONS 12/20/2021 8:59 am TECHNIQUE: CT of the left shoulder was performed without the administration of intravenous contrast.  Multiplanar reformatted images are provided for review. Dose modulation, iterative reconstruction, and/or weight based adjustment of the mA/kV was utilized to reduce the radiation dose to as low as reasonably achievable. ; 3D reconstructions were performed on a separate workstation. Dose modulation, iterative reconstruction, and/or weight based adjustment of the mA/kV was utilized to reduce the radiation dose to as low as reasonably achievable. COMPARISON: None. HISTORY ORDERING SYSTEM PROVIDED HISTORY: fracture/preoperative planning TECHNOLOGIST PROVIDED HISTORY: Reason for exam:->fracture/preoperative planning What reading provider will be dictating this exam?->CRC FINDINGS: Bones:   Focal fracture fragment measures 2.2 cm identified anterior to humeral head. There is a corresponding cortical defect in the anterior aspect of the humeral head, which appears to extend into the bicipital groove. The glenoid appears intact. There is no dislocation at the glenohumeral joint. Cortical joint appears intact. Soft Tissue:   Soft tissue edema possible joint effusion identified in the subscapularis and teres minor. Reversed Hill-Sachs fracture of the humeral head. The glenohumeral joint is not dislocated. Focal 2 cm fracture fragment anterior to the humeral head. RECOMMENDATIONS: Unavailable     CT SHOULDER RIGHT WO CONTRAST    Result Date: 12/20/2021  EXAMINATION: CT OF THE RIGHT SHOULDER WITHOUT CONTRAST 12/20/2021 8:59 am TECHNIQUE: CT of the right shoulder was performed without the administration of intravenous contrast.  Multiplanar reformatted images are provided for review. Dose modulation, iterative reconstruction, and/or weight based adjustment of the mA/kV was utilized to reduce the radiation dose to as low as reasonably achievable. patchy residual opacities most evident in the left greater than right mid to lower lung zone. No effusion. Patchy opacities may represent areas of residual scarring. Acute infiltrates from pneumonia not entirely excluded. Overall, improved aeration from previous. Short-term follow-up recommended if symptoms persist.     MRI ABDOMEN W WO CONTRAST MRCP    Result Date: 12/21/2021  EXAMINATION: MRI OF THE ABDOMEN WITH AND WITHOUT CONTRAST AND MRCP 12/20/2021 3:49 pm TECHNIQUE: Multiplanar multisequence MRI of the abdomen was performed without and with the administration of 20 mL ProHance intravenous contrast.  After initial T2 axial and coronal images, thick slab, thin slab and 3D coronal MRCP sequences were obtained without the administration of intravenous contrast.  MIP images are provided for review. COMPARISON: Chest CT on 10/09/2021, abdomen/pelvis CT on 11/30/2018 HISTORY: Enlarging pancreatic body lesion. FINDINGS: Image quality degraded by motion artifact. Along the anterior pancreatic body, there is a T2 hyperintense lesion measuring 1.2 x 0.8 x 0.8 cm which is in close proximity to the main pancreatic duct. No pancreatic ductal dilatation or pancreas divisum. No abnormal pancreatic enhancement. Liver demonstrates normal morphology without steatosis. On the initial post-contrast sequence, there are multiple hypoenhancing liver lesions with the largest lesions measuring up to 2 cm in the periphery of segment 8 and 1.7 cm in segment 5. These are inapparent on venous phase sequences. The dominant segment 8 lesion demonstrates only mild T2 hyperintense signal.  No definite restricted diffusion seen. Cholecystectomy. No significant biliary ductal dilatation. No intraductal filling defect. Chronic infarct along the medial spleen. No adrenal nodule. 3.3 cm left renal cyst.  Kidneys demonstrate symmetric enhancement without hydronephrosis. No upper abdominal lymphadenopathy or ascites.   Visualized osseous structures and gastrointestinal tract are unremarkable. Abdominal aorta is normal caliber. Susceptible artifact from sternotomy changes. 1. Multiple liver lesions as described above are only well seen on the initial post-contrast sequence and not well visualized on other sequences. The dominant 2 cm segment 8 lesion demonstrates only mild T2 hyperintense signal.  Motion artifact limits evaluation. Only 1 lesion was apparent on prior CT from 2018. Given the multiplicity of lesions, metastatic disease is the primary concern. Recommend multiphase liver CT for further evaluation. 2. A nonenhancing cystic lesion along the anterior pancreatic body measuring up to 1.2 cm has mildly enlarged from 2018. This is favored to be benign, possibly a branch duct IPMN, and can be re-evaluated on follow-up MRI in 1 year. CTA PULMONARY W CONTRAST    Result Date: 12/18/2021  EXAMINATION: CTA OF THE CHEST 12/18/2021 3:23 pm TECHNIQUE: CTA of the chest was performed after the administration of intravenous contrast.  Multiplanar reformatted images are provided for review. MIP images are provided for review. Dose modulation, iterative reconstruction, and/or weight based adjustment of the mA/kV was utilized to reduce the radiation dose to as low as reasonably achievable. COMPARISON: None. HISTORY: ORDERING SYSTEM PROVIDED HISTORY: hypoxia TECHNOLOGIST PROVIDED HISTORY: Reason for exam:->hypoxia Decision Support Exception - unselect if not a suspected or confirmed emergency medical condition->Emergency Medical Condition (MA) FINDINGS: Pulmonary Arteries: Within limitations of artifacts such as motion and suboptimal contrast opacification, no definite filling defects of acute PE are seen Mediastinum: Aortic valve device present. Lungs/pleura: Greatly improved pulmonary aeration. Residual mild dependent density suggesting atelectasis with possibly incompletely cleared COVID pneumonia Upper Abdomen: Cholecystectomy. Left renal cyst is seen. Adrenal glands are mildly thickened and possible nodule for example left-side on image 251 at 1.4 cm. Pancreatic body region low-density lesion is present approximately 1.6 cm size Soft Tissues/Bones: No acute bone or soft tissue abnormality. Post sternotomy. Old compression deformity at least is at T5 and T3. Proximal humerus deformities and fragmentation suggesting medial comminuted impaction fractures     1. No acute pulmonary embolus is identified within limitations 2. Pulmonary aeration is greatly improved 3. Redemonstrated pancreatic body lesion, recommend dedicated MRI or CT evaluation 4. Developed comminuted medial humeral head fractures     CT 3D RECONSTRUCTION    Result Date: 12/20/2021  EXAMINATION: 3D RECONSTRUCTIONS 12/20/2021 8:59 am TECHNIQUE: 3D reconstructions were performed on a separate workstation. Dose modulation, iterative reconstruction, and/or weight based adjustment of the mA/kV was utilized to reduce the radiation dose to as low as reasonably achievable. COMPARISON: None. HISTORY: ORDERING SYSTEM PROVIDED HISTORY: preoperative planning TECHNOLOGIST PROVIDED HISTORY: Reason for exam:->preoperative planning What reading provider will be dictating this exam?->CRC FINDINGS: 3D volumetric rendering of the left shoulder. Reversal/fractures seen on on CT images. Please referred to left shoulder CT report for further detail. RECOMMENDATIONS: Unavailable     CT 3D RECONSTRUCTION    Result Date: 12/20/2021  EXAMINATION: CT OF THE LEFT SHOULDER WITHOUT CONTRAST; 3D RECONSTRUCTIONS 12/20/2021 8:59 am TECHNIQUE: CT of the left shoulder was performed without the administration of intravenous contrast.  Multiplanar reformatted images are provided for review. Dose modulation, iterative reconstruction, and/or weight based adjustment of the mA/kV was utilized to reduce the radiation dose to as low as reasonably achievable. ; 3D reconstructions were performed on a separate workstation. Dose modulation, iterative reconstruction, and/or weight based adjustment of the mA/kV was utilized to reduce the radiation dose to as low as reasonably achievable. COMPARISON: None. HISTORY ORDERING SYSTEM PROVIDED HISTORY: fracture/preoperative planning TECHNOLOGIST PROVIDED HISTORY: Reason for exam:->fracture/preoperative planning What reading provider will be dictating this exam?->CRC FINDINGS: Bones:   Focal fracture fragment measures 2.2 cm identified anterior to humeral head. There is a corresponding cortical defect in the anterior aspect of the humeral head, which appears to extend into the bicipital groove. The glenoid appears intact. There is no dislocation at the glenohumeral joint. Cortical joint appears intact. Soft Tissue:   Soft tissue edema possible joint effusion identified in the subscapularis and teres minor. Reversed Hill-Sachs fracture of the humeral head. The glenohumeral joint is not dislocated. Focal 2 cm fracture fragment anterior to the humeral head. RECOMMENDATIONS: Unavailable     MRI BRAIN W WO CONTRAST    Result Date: 12/19/2021  EXAMINATION: MRI OF THE BRAIN WITHOUT AND WITH CONTRAST  12/19/2021 8:20 am TECHNIQUE: Multiplanar multisequence MRI of the head/brain was performed without and with the administration of intravenous contrast. COMPARISON: CT head 12/18/2021 HISTORY: ORDERING SYSTEM PROVIDED HISTORY: new onset seizures - epilepsy protocol TECHNOLOGIST PROVIDED HISTORY: Reason for exam:->new onset seizures - epilepsy protocol What reading provider will be dictating this exam?->CRC FINDINGS: Ischemia: No restricted diffusion is seen to suggest ischemia. Parenchyma: Punctate focus of T1 and T2 hypointensity in the left occipital periventricular white matter, with susceptibility artifact on GRE sequence. Focal volume loss of the left superior parietal lobule, without evidence of gliosis. No typical white matter hyperintensities of systemic lupus erythematosus. Unremarkable signal intensity and morphology of the bilateral hippocampal formations. No abnormal intracranial enhancement is seen. Ventricles: No evidence of hydrocephalus. Flow voids: Flow voids are present in the proximal major intracranial arteries. Recent     Old micro bleed in the left occipital periventricular white matter. Left parietal lobe volume loss No typical white matter hyperintensities of systemic lupus erythematosus. No evidence of recent infarct, intracranial hemorrhage, mass effect, or hydrocephalus. No abnormal intracranial enhancement. 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 CTA pulmonary with no PE. Redemonstrated pancreatic body lesion. Developed comminuted medial humeral head fractures. CT scans of bilateral shoulders confirm diagnosis bilateral 2 cm fracture segments of the anterior humeral head   - MRI abdomen w and wo contrast MRCP showed multiple liver lesions only well seen on the initial post-contrast sequence and not well visualized on other sequences. The dominant 2 cm segment 8 lesion demonstrates only mild T2 hyperintense signal.  Only 1 lesion was apparent on prior CT from 2018. A nonenhancing cystic lesion along the anterior pancreatic body measuring up to 1.2 cm has mildly enlarged from 2018  - Ortho surgery following for fractures and believe findings are chronic in nature recommending PT/OT. - Neurology following for new seizure like activity. Patient started on Keppra. S/P EEG with no acute seizure seen  - CBC with mild microcytic anemia and normal WBC and platelets. CMP essentially normal as are LFT's. CEA 0.6 and Ca 19-9 pending  - Will ask Dr. Lisa Bolton and [facility] to take a look at scans.  The pancreatic tail lesion is only slightly enlarged and at 1.2 cm, even if IPMN, would not require resection (gray zone between 1-2 cm). Liver lesions are not definitively neoplastic     12/22/21  - CBC continues with mild and stable microcytic anemia  - Neurology following for new seizure like activity. Patient started on Keppra. S/P EEG with no acute seizure seen  - HBS following. CTA triphasic with multiple liver lesions remain visible only as hypo-enhancing on arterial phase. Cystic structure in pancreas measures up to 1.2 cm. Ca 19-9 pending and chromogranin A. Will follow with results and further recommendations. - To be discharged. Follow up as outpatient  TERRIE Caal - CNP  Electronically signed 12/22/2021 at 10:48 AM  Pt seen and examined.  Note updated  Marichuy Ch MD

## 2021-12-22 NOTE — PROGRESS NOTES
CLINICAL PHARMACY NOTE: MEDS TO BEDS    Total # of Prescriptions Filled: 2   The following medications were delivered to the patient:  · KEPPRA 500 MG  · KEPPRA 1000 MG    Additional Documentation:

## 2021-12-22 NOTE — PROGRESS NOTES
HEPATOBILIARY AND PANCREATIC SURGERY  DAILY PROGRESS NOTE  12/22/2021    Chief Complaint   Patient presents with    Shortness of Breath     Treated at [facility] ED this am with shoulder pain, and stiffness, was discharged arond 10am, and while laying down on the sofa , witnessed having the same type of stiffness, confused at the time, nohistory of seizures.  Shoulder Pain     shoulder discomfort started this am, ache, discomfort with liftening arms. Subjective:  No acute events overnight, denies abdominal pain, nausea, emesis. Flat affect this morning. Objective:  BP (!) 158/85   Pulse 90   Temp 97.3 °F (36.3 °C) (Temporal)   Resp 22   Ht 6' 3\" (1.905 m)   Wt 236 lb 9.6 oz (107.3 kg)   SpO2 90%   BMI 29.57 kg/m²     GENERAL:  Laying in bed, awake, alert, cooperative, no apparent distress, flat affect  HEAD: Normocephalic, atraumatic  EYES: No sclera icterus, pupils equal  LUNGS:  No increased work of breathing on room air  CARDIOVASCULAR:  RR  ABDOMEN:  Soft, non-tender, non-distended  EXTREMITIES: No edema or swelling  SKIN: Warm and dry    Assessment/Plan:  46 y.o. male with stable 1.2 cm cystic pancreatic body lesion, multiple new liver lesions seen on MRI abdomen presenting for seizure like activity.     - CTA triphasic pancreas today  - NPO for study, OK for diet after CTA  - daily INR  - CEA 0.6, CA 19-9 pending  - Ortho following for chronic shoulder fractures  - Neurology following for seizure like activity  - Plan pending CTA triphasic results    Electronically signed by Alejandra Macario MD on 12/22/2021 at 6:15 AM

## 2021-12-22 NOTE — DISCHARGE SUMMARY
Physician Discharge Summary     Patient ID:  Goldie De La O  [id number]  46 y.o.  1970    Admit date: 12/18/2021    Discharge date and time: 12/22/2021    Admission Diagnoses:   Patient Active Problem List   Diagnosis    Acute respiratory failure with hypoxia (HCC)    Pneumonia    History of DVT (deep vein thrombosis)    Hx of aortic valve replacement    Hyponatremia    Partial idiopathic epilepsy with seizures of localized onset, not intractable, without status epilepticus ([address]    Clotting disorder [facility] [facility]    Chronic anticoagulation    Obesity (BMI 30-39. 9)    Hypoxia    Pancreatic lesion       Discharge Diagnoses: Seizure like activity, liver lesions    Consults: Hematology, Neurology, Orthopedic, Gen. Surg. Procedures: EEG    Hospital Course: The patient is a 46 y.o. male of Jarrett Ramos DO  with significant past medical history of thrombophilia with lupus anticoagulant, history of DVT, valve replacement on Coumadin therapy chronically who presents with complaints of seizure-like activity at home. He is not clear if he actually did have seizures. He was told by his wife that he was found shaking but she thought it might have been a manifestation of his sleep apnea (?) Apparently similar episode a couple of months ago. No signs or symptoms of urinary or bowel incontinence tongue biting associated with seizure activity. Work-up in emergency department did reveal a questionable humeral fracture on CTA of the chest that was done. Patient subsequently admitted for further neurological and orthopedic work-up. He denies any trauma that may have caused bilateral shoulder fractures. He denies hitting his shoulders on anything. X-rays of bilateral shoulders do indicate possible fracture fragments.      Admit to telemetry for evaluation of seizures  Empiric antiepileptic-Keppra twice daily 500 twice daily, increase to 1000 twice daily-patient refused, encouraged to start taking today   EEG 11.3*   HCT 35.2*          Recent Labs     12/21/21  1127      K 3.8      CO2 19*   BUN 19   CREATININE 0.9   CALCIUM 8.8       XR SHOULDER RIGHT (MIN 2 VIEWS)    Result Date: 12/19/2021  EXAMINATION: THREE XRAY VIEWS OF THE RIGHT SHOULDER 12/19/2021 9:24 am COMPARISON: None. HISTORY: ORDERING SYSTEM PROVIDED HISTORY: fracture TECHNOLOGIST PROVIDED HISTORY: Reason for exam:->fracture What reading provider will be dictating this exam?->CRC FINDINGS: Focal 1 cm calcified density identified adjacent to the inferior portion of the glenoid. Glenohumeral joint is normally aligned. No abnormal periarticular calcifications. The [facility] joint is unremarkable in appearance. Visualized lung is unremarkable. 1 cm calcified fragment may represent a fracture fragment. CT Head WO Contrast    Result Date: 12/18/2021  EXAMINATION: CT OF THE HEAD WITHOUT CONTRAST  12/18/2021 7:03 am TECHNIQUE: CT of the head was performed without the administration of intravenous contrast. Dose modulation, iterative reconstruction, and/or weight based adjustment of the mA/kV was utilized to reduce the radiation dose to as low as reasonably achievable. COMPARISON: 10/09/2021 HISTORY: ORDERING SYSTEM PROVIDED HISTORY: altered TECHNOLOGIST PROVIDED HISTORY: Reason for exam:->altered Has a \"code stroke\" or \"stroke alert\" been called? ->No Decision Support Exception - unselect if not a suspected or confirmed emergency medical condition->Emergency Medical Condition (MA) What reading provider will be dictating this exam?->CRC FINDINGS: BRAIN/VENTRICLES: There is no acute intracranial hemorrhage, mass effect or midline shift. No abnormal extra-axial fluid collection. The gray-white differentiation is maintained without evidence of an acute infarct. There is no evidence of hydrocephalus. ORBITS: The visualized portion of the orbits demonstrate no acute abnormality.  SINUSES: Small retention cysts or polyps at the base of each maxillary sinus. SOFT TISSUES/SKULL:  No acute abnormality of the visualized skull or soft tissues. No acute intracranial abnormality. MRI would be useful if symptoms persist. RECOMMENDATIONS: Unavailable     XR SHOULDER LEFT (MIN 2 VIEWS)    Result Date: 12/19/2021  EXAMINATION: THREE XRAY VIEWS OF THE LEFT SHOULDER 12/19/2021 9:24 am COMPARISON: None. HISTORY: ORDERING SYSTEM PROVIDED HISTORY: fracture TECHNOLOGIST PROVIDED HISTORY: Reason for exam:->fracture What reading provider will be dictating this exam?->CRC FINDINGS: Limited evaluation due to nonstandard projections. 1.6 cm bony density identified adjacent to the glenoid. Acromioclavicular joint is intact no pneumothorax in the partially visualize left lung. No abnormal soft tissue edema. Possible fracture fragment adjacent to the glenoid. Evaluation mildly due to nonstandard projections     XR CHEST PORTABLE    Result Date: 12/18/2021  EXAMINATION: ONE XRAY VIEW OF THE CHEST 12/18/2021 6:38 am COMPARISON: 10/10/2021 HISTORY: ORDERING SYSTEM PROVIDED HISTORY: sob TECHNOLOGIST PROVIDED HISTORY: Reason for exam:->sob What reading provider will be dictating this exam?->CRC FINDINGS: EKG leads overlie the chest.  Sternotomy and valve replacement hardware. Cardiac silhouette is borderline prominent but unchanged. No pneumothorax. Interval improvement in aeration of the lungs with some patchy residual opacities most evident in the left greater than right mid to lower lung zone. No effusion. Patchy opacities may represent areas of residual scarring. Acute infiltrates from pneumonia not entirely excluded. Overall, improved aeration from previous.   Short-term follow-up recommended if symptoms persist.     CTA PULMONARY W CONTRAST    Result Date: 12/18/2021  EXAMINATION: CTA OF THE CHEST 12/18/2021 3:23 pm TECHNIQUE: CTA of the chest was performed after the administration of intravenous contrast.  Multiplanar reformatted images are provided for review. MIP images are provided for review. Dose modulation, iterative reconstruction, and/or weight based adjustment of the mA/kV was utilized to reduce the radiation dose to as low as reasonably achievable. COMPARISON: None. HISTORY: ORDERING SYSTEM PROVIDED HISTORY: hypoxia TECHNOLOGIST PROVIDED HISTORY: Reason for exam:->hypoxia Decision Support Exception - unselect if not a suspected or confirmed emergency medical condition->Emergency Medical Condition (MA) FINDINGS: Pulmonary Arteries: Within limitations of artifacts such as motion and suboptimal contrast opacification, no definite filling defects of acute PE are seen Mediastinum: Aortic valve device present. Lungs/pleura: Greatly improved pulmonary aeration. Residual mild dependent density suggesting atelectasis with possibly incompletely cleared COVID pneumonia Upper Abdomen: Cholecystectomy. Left renal cyst is seen. Adrenal glands are mildly thickened and possible nodule for example left-side on image 251 at 1.4 cm. Pancreatic body region low-density lesion is present approximately 1.6 cm size Soft Tissues/Bones: No acute bone or soft tissue abnormality. Post sternotomy. Old compression deformity at least is at T5 and T3. Proximal humerus deformities and fragmentation suggesting medial comminuted impaction fractures     1. No acute pulmonary embolus is identified within limitations 2. Pulmonary aeration is greatly improved 3. Redemonstrated pancreatic body lesion, recommend dedicated MRI or CT evaluation 4.  Developed comminuted medial humeral head fractures     MRI BRAIN W WO CONTRAST    Result Date: 12/19/2021  EXAMINATION: MRI OF THE BRAIN WITHOUT AND WITH CONTRAST  12/19/2021 8:20 am TECHNIQUE: Multiplanar multisequence MRI of the head/brain was performed without and with the administration of intravenous contrast. COMPARISON: CT head 12/18/2021 HISTORY: ORDERING SYSTEM PROVIDED HISTORY: new onset seizures - epilepsy protocol TECHNOLOGIST PROVIDED HISTORY: Reason for exam:->new onset seizures - epilepsy protocol What reading provider will be dictating this exam?->CRC FINDINGS: Ischemia: No restricted diffusion is seen to suggest ischemia. Parenchyma: Punctate focus of T1 and T2 hypointensity in the left occipital periventricular white matter, with susceptibility artifact on GRE sequence. Focal volume loss of the left superior parietal lobule, without evidence of gliosis. No typical white matter hyperintensities of systemic lupus erythematosus. Unremarkable signal intensity and morphology of the bilateral hippocampal formations. No abnormal intracranial enhancement is seen. Ventricles: No evidence of hydrocephalus. Flow voids: Flow voids are present in the proximal major intracranial arteries. Recent     Old micro bleed in the left occipital periventricular white matter. Left parietal lobe volume loss No typical white matter hyperintensities of systemic lupus erythematosus. No evidence of recent infarct, intracranial hemorrhage, mass effect, or hydrocephalus. No abnormal intracranial enhancement. Discharge Exam:    HEENT: NCAT,  PERRLA, No JVD  Heart:  RRR, no murmurs, gallops, or rubs. Lungs:  CTA bilaterally, no wheeze, rales or rhonchi  Abd: bowel sounds present, nontender, nondistended, no masses  Extrem:  No clubbing, cyanosis, or edema    Disposition: home     Patient Condition at Discharge: Stable    Patient Instructions:      Medication List      START taking these medications    * levETIRAcetam 500 MG tablet  Commonly known as: KEPPRA  Take 1 tablet by mouth 2 times daily for 10 doses     * levETIRAcetam 1000 MG tablet  Commonly known as: KEPPRA  Take 1 tablet by mouth 2 times daily Start taking this dose after finishing the 500 bid dose for 5 days  Start taking on: December 26, 2021         * This list has 2 medication(s) that are the same as other medications prescribed for you.  Read the directions carefully, and ask your doctor or other care provider to review them with you. CONTINUE taking these medications    dilTIAZem 120 MG extended release capsule  Commonly known as: CARDIZEM CD     fluticasone 50 MCG/ACT nasal spray  Commonly known as: FLONASE  2 sprays in each nostril bid     metoprolol succinate 25 MG extended release tablet  Commonly known as: TOPROL XL     * warfarin 7.5 MG tablet  Commonly known as: COUMADIN     * warfarin 5 MG tablet  Commonly known as: COUMADIN         * This list has 2 medication(s) that are the same as other medications prescribed for you. Read the directions carefully, and ask your doctor or other care provider to review them with you. Where to Get Your Medications      These medications were sent to [facility] [address] [address] [address]    Phone: [phone]   · levETIRAcetam 1000 MG tablet  · levETIRAcetam 500 MG tablet       Activity: activity as tolerated  Diet: renal diet    Pt has been advised to: Follow-up with Nicole Eubanks DO in 1 week.   Follow-up with consultants as recommended by them    Note that over 30 minutes was spent in preparing discharge papers, discussing discharge with patient, medication review, etc.    Signed:  Ty Valenzuela MD  12/22/2021  1:32 PM

## 2021-12-22 NOTE — PLAN OF CARE
Problem: Pain:  Goal: Pain level will decrease  Description: Pain level will decrease  Outcome: Completed  Goal: Control of acute pain  Description: Control of acute pain  Outcome: Completed  Goal: Control of chronic pain  Description: Control of chronic pain  Outcome: Completed     Problem: Falls - Risk of:  Goal: Will remain free from falls  Description: Will remain free from falls  Outcome: Completed  Goal: Absence of physical injury  Description: Absence of physical injury  Outcome: Completed     Problem: Musculor/Skeletal Functional Status  Goal: Highest potential functional level  Outcome: Completed  Goal: Absence of falls  Outcome: Completed

## 2021-12-22 NOTE — PROGRESS NOTES
Pt is asking for pain medication for discharge. Notified Dr. Antonio Ceron. Harmony George  Awaiting call back

## 2021-12-22 NOTE — PROGRESS NOTES
OT SESSION ATTEMPT     Date:2021  Patient Name: Lucius Marx  MRN: [id number]  : 1970  Room: [address] [place], [place] [address]     Attempted OT session this date:    [] unavailable due to other medical staff currently with pt   [] on hold, await MRI/ neurosurgical recommendations. [] on hold per nursing staff secondary to lab / radiology results    [] declined Occupational Therapy  this date due to ___. Benefits of participation in therapy reviewed with pt.    [] off unit   [x] Other:  Attempted to see pt in mid morning and early afternoon. Pt not feeling well in morning and sleeping in PM. Wife present in room. Will reattempt OT tx at a later time.     [place], [place] [address]

## 2021-12-23 ENCOUNTER — TELEPHONE (OUTPATIENT)
Dept: ORTHOPEDIC SURGERY | Age: 51
End: 2021-12-23

## 2021-12-23 LAB — CA 19-9: 3 U/ML (ref 0–37)

## 2021-12-26 LAB — CHROMOGRANIN A: 57 NG/ML (ref 0–103)

## 2022-01-03 ENCOUNTER — TELEPHONE (OUTPATIENT)
Dept: HEMATOLOGY | Age: 52
End: 2022-01-03

## 2022-01-05 ENCOUNTER — OFFICE VISIT (OUTPATIENT)
Dept: ORTHOPEDIC SURGERY | Age: 52
End: 2022-01-05
Payer: COMMERCIAL

## 2022-01-05 DIAGNOSIS — S43.006A DISLOCATION OF SHOULDER REGION, UNSPECIFIED LATERALITY, INITIAL ENCOUNTER: Primary | ICD-10-CM

## 2022-01-05 PROCEDURE — 99204 OFFICE O/P NEW MOD 45 MIN: CPT | Performed by: ORTHOPAEDIC SURGERY

## 2022-01-05 RX ORDER — OXYCODONE HYDROCHLORIDE AND ACETAMINOPHEN 5; 325 MG/1; MG/1
1 TABLET ORAL EVERY 6 HOURS PRN
Qty: 20 TABLET | Refills: 0 | Status: SHIPPED | OUTPATIENT
Start: 2022-01-05 | End: 2022-01-12

## 2022-01-05 RX ORDER — OXYCODONE HYDROCHLORIDE 5 MG/1
5 TABLET ORAL EVERY 6 HOURS PRN
Qty: 28 TABLET | Refills: 0 | Status: CANCELLED | OUTPATIENT
Start: 2022-01-05 | End: 2022-01-12

## 2022-01-05 NOTE — PROGRESS NOTES
New Shoulder Patient Visit     Referring Provider:   [facility]  Emergency Department   12/18/2021    CHIEF COMPLAINT:   Chief Complaint   Patient presents with   [facility] ED Follow-up     12/18/2021. Pt presented to the ED for seizure activity, first episode. States that he has had bilat shoulder pain since. Was told at [facility]  that he had fractures in the shoulder. States he was on the couch when he had his seizure, no fall. Limited ROM. \"2 cm fracture segments of the anterior humeral head\"     Results     XR + CT         HPI:      Shobha Khalil is a 46y.o. year old male who is seen today  for evaluation of bilateral shoulder pain. He suffered what sounds to be 2 separate seizure events around December 18, second of which was witnessed by his family. These did not seem to be provoked. He has no seizure history. There is no indication he suffered any type of fall. He was brought into the emergency department and subsequently found to have bilateral reverse Hill-Sachs lesions of his shoulders. He was eventually discharged home and follows appear today for further assessment for by his bilateral shoulders. The patient is right hand dominant. The patient is working. He works in air traffic controlling. PAST MEDICAL HISTORY  Past Medical History:   Diagnosis Date    DVT [facility]deep venous thrombosis) ([facility])     Lupus anticoagulant disorder ([facility] [address]        PAST SURGICAL HISTORY  Past Surgical History:   Procedure Laterality Date    AORTIC VALVE REPLACEMENT  01/2019    CHOLECYSTECTOMY      HERNIA REPAIR      x 2       FAMILY HISTORY   No family history on file.     SOCIAL HISTORY  Social History     Occupational History    Not on file   Tobacco Use    Smoking status: Never Smoker    Smokeless tobacco: Never Used   Vaping Use    Vaping Use: Never used   Substance and Sexual Activity    Alcohol use: Not Currently    Drug use: Never    Sexual activity: Not Currently       CURRENT MEDICATIONS     Current Outpatient Medications:   levETIRAcetam (KEPPRA) 500 MG tablet, Take 1 tablet by mouth 2 times daily for 10 doses, Disp: 10 tablet, Rfl: 0    levETIRAcetam (KEPPRA) 1000 MG tablet, Take 1 tablet by mouth 2 times daily Start taking this dose after finishing the 500 bid dose for 5 days, Disp: 60 tablet, Rfl: 3    warfarin (COUMADIN) 7.5 MG tablet, Take 7.5 mg by mouth Five times weekly on Sunday, Monday, Tuesday, Thursday and Friday, Disp: , Rfl:     dilTIAZem (CARDIZEM CD) 120 MG extended release capsule, Take 120 mg by mouth daily, Disp: , Rfl:     metoprolol succinate (TOPROL XL) 25 MG extended release tablet, Take 50 mg by mouth daily , Disp: , Rfl:     fluticasone (FLONASE) 50 MCG/ACT nasal spray, 2 sprays in each nostril bid, Disp: 1 Bottle, Rfl: 5    warfarin (COUMADIN) 5 MG tablet, Take 5 mg by mouth Twice a Week On Wednesday and Saturday, Disp: , Rfl:     ALLERGIES  No Known Allergies    Controlled Substances Monitoring:        REVIEW OF SYSTEMS:     Constitutional:  Negative for weight loss, fevers, chills, fatigue  Cardiovascular: Negative for chest pain, palpitations  Pulmonary: Negative for shortness of breath, labored breathing, cough  GI: negative for abdominal pain, nausea, vomitting   MSK: per HPI  Skin: negative for rash, open wounds    All other systems reviewed and are negative           PHYSICAL EXAM     There were no vitals filed for this visit. Height:    Weight: [unfilled]  BMI:  There is no height or weight on file to calculate BMI. General: The patient is alert and oriented x 3, appears to be stated age and in no distress. HEENT: head is normocephalic, atraumatic. EOMI. Neck: supple, trachea midline, no thyromegaly   Cardiovascular: peripheral pulses palpable.   Normal Capillary refill   Respiratory: breathing unlabored, chest expansion symmetric   Skin: no rash, no open wounds, no erythema  Psych: normal affect; mood stable  Neurologic: gait normal, sensation grossly intact in extremities  MSK:    Cervical Spine: There is no tenderness to palpation along the cervical spine. Range of motion is normal.  Spurling's is negative    Shoulder Exam:   Right shoulder:    Exam of the right shoulder shows he can actively elevate about 90 degrees, passively I can increase him to about 100 2230 degrees. There is no significant apprehension of the shoulder. There is some mild diffuse tenderness over the shoulder. Belly press test exam shows mild limitations but he can internally rotate some but also protracted scapula when attempting to do so. Zunilda Rued He does have fairly good strength with Umang test.  Speed and Ingham's tests are equivocal    Left shoulder:    Exam the left shoulder shows he can also elevate about 70 to 80 degrees, a bit more of a drop arm sign on the left side he has difficulty holding his arm in Umang test position. Likewise, he does have somewhat worse internal rotation with belly press test on the left. External rotation is intact without significant pain or weakness. He does have some diffuse tenderness over the shoulder. There is no noted neurological deficit throughout either bilateral upper extremity    IMAGING:     XRAY: Multiple x-ray views of both shoulders were reviewed from the emergency department showing abnormality of the humeral head    CT scan including recons were performed of bilateral shoulders which better appreciate the reverse Hill-Sachs defect, which is fairly large and deep on the right side with some displaced fragment. Left side also significant although fragment appears to be more impacted. Radiographic findings reviewed with patient    ASSESSMENT   Bilateral shoulder reverse Hill-Sachs defect from presumed posterior dislocation after suffering new onset seizures December 18      PLAN  We discussed his shoulders. He has a very difficult and uncommon problem.   Suspect he had posterior dislocation/relocation of bilateral shoulders during seizure events. At time of admission in hospital his shoulders were reduced. CT scans with reconstructions were obtained. Findings more prominent on the right side for significant impaction of lesser tuberosity with some fragmentation and mild displacement consistent with reverse Hill-Sachs defect. Glenoid appears to be intact. Likewise similar findings on the left although not as prominent and less displacement of fracture fragment. Interestingly his left shoulder exam is a bit more limited than the right. He is right-hand dominant. At this point, we discussed multiple different treatment options including conservative treatment with observation and PT versus potential surgery. Bilateral nature of his injuries makes this more difficult to deal with especially in the setting of attempt to repair any bony fragments. I would like to obtain an MRI of the right shoulder given it is worse radiographically and that this is his dominant arm. We will also likely obtain MRI of the left shoulder to also determine the extent of his injury and to help determine treatment moving forward. He is in agreement with this plan. Bilateral shoulder MRIs are indicated due to significant injury of both shoulders, which will help determine which shoulder is addressed first and in which way we are able to offer potential surgical management if indicated i.e. open versus arthroscopic.         Shante Anton MD  Orthopaedic Surgery   1/5/22  1:17 PM

## 2022-01-07 ENCOUNTER — OFFICE VISIT (OUTPATIENT)
Dept: HEMATOLOGY | Age: 52
End: 2022-01-07
Payer: COMMERCIAL

## 2022-01-07 VITALS
SYSTOLIC BLOOD PRESSURE: 157 MMHG | TEMPERATURE: 97.3 F | DIASTOLIC BLOOD PRESSURE: 98 MMHG | WEIGHT: 235 LBS | HEART RATE: 64 BPM | RESPIRATION RATE: 18 BRPM | BODY MASS INDEX: 29.22 KG/M2 | HEIGHT: 75 IN | OXYGEN SATURATION: 97 %

## 2022-01-07 DIAGNOSIS — R16.0 LIVER MASS: Primary | ICD-10-CM

## 2022-01-07 DIAGNOSIS — Z95.2 AORTIC VALVE REPLACED: ICD-10-CM

## 2022-01-07 DIAGNOSIS — D68.62 LUPUS ANTICOAGULANT DISORDER (HCC): ICD-10-CM

## 2022-01-07 DIAGNOSIS — K76.9 LIVER LESION: ICD-10-CM

## 2022-01-07 PROCEDURE — 99213 OFFICE O/P EST LOW 20 MIN: CPT | Performed by: TRANSPLANT SURGERY

## 2022-01-07 PROCEDURE — 99212 OFFICE O/P EST SF 10 MIN: CPT | Performed by: TRANSPLANT SURGERY

## 2022-01-07 NOTE — PROGRESS NOTES
Hepatobiliary and Pancreatic Surgery Attending History and Physical    Patient's Name/Date of Birth: Jeanmarie Klinefelter /1970 (37 y.o.)    Date: January 7, 2022     CC:2cm liver lesion and 1.2cm pancreatic cyst    HPI:  Six 57-year-old male presenting with seizure-like activity. Patient has a past medical history of thrombocytopenia with lupus anticoagulation, history of DVT, history of valve replacement on Xarelto. He was told by his wife that he was found shaking but does not remember the episode himself. He had a triphasic CT scan which was very grainy due to him not being able to lift his arms above his head. Also his MRI showed severe motion artifact. He does have a pancreatic tail cyst and possibly new liver lesions. He is on coumadin due to have lupus anticoagulant disorder. He is also up to date on his colonoscopy's. Tumor markers - CEA, Ca 19-9 and chromogranin A all normal    Past Medical History:   Diagnosis Date    DVT (deep venous thrombosis) (HCC)     Lupus anticoagulant disorder (HCC)        Past Surgical History:   Procedure Laterality Date    AORTIC VALVE REPLACEMENT  01/2019    CHOLECYSTECTOMY      HERNIA REPAIR      x 2       Current Outpatient Medications   Medication Sig Dispense Refill    oxyCODONE-acetaminophen (PERCOCET) 5-325 MG per tablet Take 1 tablet by mouth every 6 hours as needed for Pain for up to 7 days.  20 tablet 0    levETIRAcetam (KEPPRA) 500 MG tablet Take 1 tablet by mouth 2 times daily for 10 doses 10 tablet 0    levETIRAcetam (KEPPRA) 1000 MG tablet Take 1 tablet by mouth 2 times daily Start taking this dose after finishing the 500 bid dose for 5 days 60 tablet 3    warfarin (COUMADIN) 7.5 MG tablet Take 7.5 mg by mouth Five times weekly on Sunday, Monday, Tuesday, Thursday and Friday      dilTIAZem (CARDIZEM CD) 120 MG extended release capsule Take 120 mg by mouth daily      metoprolol succinate (TOPROL XL) 25 MG extended release tablet Take 50 mg by mouth daily       fluticasone (FLONASE) 50 MCG/ACT nasal spray 2 sprays in each nostril bid 1 Bottle 5    warfarin (COUMADIN) 5 MG tablet Take 5 mg by mouth Twice a Week On Wednesday and Saturday       No current facility-administered medications for this visit. No Known Allergies    Family history: no history cancer    Social History     Socioeconomic History    Marital status:      Spouse name: Not on file    Number of children: Not on file    Years of education: Not on file    Highest education level: Not on file   Occupational History    Not on file   Tobacco Use    Smoking status: Never Smoker    Smokeless tobacco: Never Used   Vaping Use    Vaping Use: Never used   Substance and Sexual Activity    Alcohol use: Not Currently    Drug use: Never    Sexual activity: Not Currently   Other Topics Concern    Not on file   Social History Narrative    Not on file     Social Determinants of Health     Financial Resource Strain:     Difficulty of Paying Living Expenses: Not on file   Food Insecurity:     Worried About Running Out of Food in the Last Year: Not on file    Dave of Food in the Last Year: Not on file   Transportation Needs:     Lack of Transportation (Medical): Not on file    Lack of Transportation (Non-Medical):  Not on file   Physical Activity:     Days of Exercise per Week: Not on file    Minutes of Exercise per Session: Not on file   Stress:     Feeling of Stress : Not on file   Social Connections:     Frequency of Communication with Friends and Family: Not on file    Frequency of Social Gatherings with Friends and Family: Not on file    Attends Sabianist Services: Not on file    Active Member of Clubs or Organizations: Not on file    Attends Club or Organization Meetings: Not on file    Marital Status: Not on file   Intimate Partner Violence:     Fear of Current or Ex-Partner: Not on file    Emotionally Abused: Not on file    Physically Abused: Not on file   Nel Ashraf Sexually Abused: Not on file   Housing Stability:     Unable to Pay for Housing in the Last Year: Not on file    Number of Places Lived in the Last Year: Not on file    Unstable Housing in the Last Year: Not on file       ROS:   Review of Systems    Physical Exam:  BP (!) 157/98   Pulse 64   Temp 97.3 °F (36.3 °C) (Temporal)   Resp 18   Ht 6' 3\" (1.905 m)   Wt 235 lb (106.6 kg)   SpO2 97%   BMI 29.37 kg/m²     PSYCH: mood and affect normal, alert and oriented x 3: No apparent distress, comfortable  EYES: Sclera white, pupils equal round and reactive to light  ENMT:  Hearing normal, trachea midline, ears externally intact  LYMPH: no obvious lympadenopathy in neck. RESP: Respiratory effort was normal with no retractions or use of accessory muscles. CV:  No pedal edema  GI/ Abdomen: Soft, nondistended, nontender, no guarding, no peritoneal signs  MSK: no clubbing/ no cyanosis/ gaitnormal       Assessment/Plan:  New and enlarging 2cm hepatic masses with a 1.2cm pancreatic body cyst with lupus anticoagulant and aortic valve repair on coumadin  - I reviewed their images prior to our office visit and we also reviewed them together today. - indeterminant 2cm lesions (triphasic liver CT and MRI with contrast)  - on anticoagulation for aortic valve repair with antiphospholipid antibody - recommend against a liver biopsy at this time as his risk for bleeding is markedly increased along with risk of stopping his blood thinner and clotting.  - will plan for a PET CT    20 Minutes of which greater than 50% was spent counseling or coordinating his care. Thank you for the consultation allowing me to take part in Mr. Ge's care. Please send a copy of my note to Dr. Mandeep Dempsey.  Jones Morocho M.D.  1/7/2022  1:05 PM

## 2022-01-11 ENCOUNTER — TELEPHONE (OUTPATIENT)
Dept: HEMATOLOGY | Age: 52
End: 2022-01-11

## 2022-01-11 NOTE — TELEPHONE ENCOUNTER
I called [facility] and they were not able to authorize the PET scan and they told me I had to get the auth from [facility]. I called [facility] and spoke to [facility] nurse reviewer and I gave her all clinicals and I received a faxed auth letter with auth for PET scan with [id number] approval dates good from 1/11/22-1/10/23. I called and scheduled patient for his PET scan on 1/18/22 at [facility] arrival time of 11:45am.  I called patient and gave him the date, time and location for the PET and this date does not work for the patient. I gave him the number to radiology registration and he is going to call and get this rescheduled for another day. I did instruct him to read over the PET prep instructions that he will find on his my chart and make sure to follow them prior to the scan. I asked him to call the office back after he gets this rescheduled so that I can make him a follow up appt. He verbalized understanding.     Electronically signed by Tracy Villafuerte RN on 1/11/2022 at 10:13 AM

## 2022-01-18 ENCOUNTER — TELEPHONE (OUTPATIENT)
Dept: HEMATOLOGY | Age: 52
End: 2022-01-18

## 2022-01-18 NOTE — TELEPHONE ENCOUNTER
I called patient back to make his follow up appt. He is rescheduled for 1/25/22 for his PET. He does not want to make his follow up appt at this time stating he needs to check with his supervisor first and he will call us back when he is ready to reschedule.       Electronically signed by Rg Parson RN on 1/18/2022 at 10:20 AM

## 2022-01-25 ENCOUNTER — HOSPITAL ENCOUNTER (OUTPATIENT)
Dept: NUCLEAR MEDICINE | Age: 52
Discharge: HOME | End: 2022-01-27
Payer: COMMERCIAL

## 2022-01-25 DIAGNOSIS — R16.0 LIVER MASS: ICD-10-CM

## 2022-01-25 DIAGNOSIS — D68.62 LUPUS ANTICOAGULANT DISORDER (HCC): ICD-10-CM

## 2022-01-25 DIAGNOSIS — Z95.2 AORTIC VALVE REPLACED: ICD-10-CM

## 2022-01-25 PROCEDURE — 78815 PET IMAGE W/CT SKULL-THIGH: CPT

## 2022-01-25 PROCEDURE — 3430000000 HC RX DIAGNOSTIC RADIOPHARMACEUTICAL: Performed by: RADIOLOGY

## 2022-01-25 PROCEDURE — 78815 PET IMAGE W/CT SKULL-THIGH: CPT | Performed by: RADIOLOGY

## 2022-01-25 PROCEDURE — A9552 F18 FDG: HCPCS | Performed by: RADIOLOGY

## 2022-01-25 RX ORDER — FLUDEOXYGLUCOSE F 18 200 MCI/ML
13.9 INJECTION, SOLUTION INTRAVENOUS
Status: COMPLETED | OUTPATIENT
Start: 2022-01-25 | End: 2022-01-25

## 2022-01-25 RX ADMIN — FLUDEOXYGLUCOSE F 18 13.9 MILLICURIE: 200 INJECTION, SOLUTION INTRAVENOUS at 10:24

## 2022-01-27 ENCOUNTER — OFFICE VISIT (OUTPATIENT)
Dept: HEMATOLOGY | Age: 52
End: 2022-01-27
Payer: COMMERCIAL

## 2022-01-27 VITALS
BODY MASS INDEX: 29.47 KG/M2 | OXYGEN SATURATION: 96 % | SYSTOLIC BLOOD PRESSURE: 152 MMHG | WEIGHT: 237 LBS | HEIGHT: 75 IN | HEART RATE: 70 BPM | TEMPERATURE: 97.5 F | DIASTOLIC BLOOD PRESSURE: 97 MMHG | RESPIRATION RATE: 18 BRPM

## 2022-01-27 DIAGNOSIS — K86.9 PANCREATIC LESION: ICD-10-CM

## 2022-01-27 DIAGNOSIS — D49.0 IPMN (INTRADUCTAL PAPILLARY MUCINOUS NEOPLASM): Primary | ICD-10-CM

## 2022-01-27 PROCEDURE — 99213 OFFICE O/P EST LOW 20 MIN: CPT | Performed by: TRANSPLANT SURGERY

## 2022-01-27 PROCEDURE — 99212 OFFICE O/P EST SF 10 MIN: CPT | Performed by: TRANSPLANT SURGERY

## 2022-01-27 NOTE — PROGRESS NOTES
Hepatobiliary and Pancreatic Surgery Attending History and Physical    Patient's Name/Date of Birth: Swapna Centeno /1970 (71 y.o.)    Date: January 27, 2022     CC:1.2cm pancreatic cyst    HPI:  Six 56-year-old male presenting with seizure-like activity. Patient has a past medical history of thrombocytopenia with lupus anticoagulation, history of DVT, history of valve replacement on Xarelto. He had a triphasic CT scan which was very grainy due to him not being able to lift his arms above his head. He does have a pancreatic tail cyst and possibly new liver lesions. He is on coumadin due to have lupus anticoagulant disorder. He is also up to date on his colonoscopy's. Tumor markers - CEA, Ca 19-9 and chromogranin A all normal  The pancreatic cyst was found in 2018. He had a PetCT and is seeing me in follow up. Physical Exam:  BP (!) 152/97   Pulse 70   Temp 97.5 °F (36.4 °C) (Temporal)   Resp 18   Ht 6' 3\" (1.905 m)   Wt 237 lb (107.5 kg)   SpO2 96%   BMI 29.62 kg/m²     PSYCH: mood and affect normal, alert and oriented x 3: No apparent distress, comfortable  EYES: Sclera white, pupils equal round and reactive to light  ENMT:  Hearing normal, trachea midline, ears externally intact  LYMPH: no obvious lympadenopathy in neck. RESP: Respiratory effort was normal with no retractions or use of accessory muscles. CV:  No pedal edema  GI/ Abdomen: Soft, nondistended, nontender, no guarding, no peritoneal signs  MSK: no clubbing/ no cyanosis/ gaitnormal       Assessment/Plan:  1.2cm pancreatic body cyst with lupus anticoagulant and aortic valve repair on coumadin  - I reviewed their images prior to our office visit and we also reviewed them together today. - Pet CT was negative  - will need surveillance imaging of his pancreas yearly. - will see him after his MRI next year  - We discussed that he does not have any worrisome features which is reassuring.   We discussed what worrisome features are.  - he is currently in year 3/5 for his pancreas      20 Minutes of which greater than 50% was spent counseling or coordinating his care. Thank you for the consultation allowing me to take part in Mr. Ge's care.      Please send a copy of my note to Dr. Elizabeth Blanchard    Electronically signed by Charisma Spangler MD on 1/27/2022 at 1:03 PM

## 2022-01-30 ENCOUNTER — HOSPITAL ENCOUNTER (OUTPATIENT)
Dept: MRI IMAGING | Age: 52
Discharge: HOME | End: 2022-02-01
Payer: COMMERCIAL

## 2022-01-30 DIAGNOSIS — S43.006A DISLOCATION OF SHOULDER REGION, UNSPECIFIED LATERALITY, INITIAL ENCOUNTER: ICD-10-CM

## 2022-01-30 PROCEDURE — 73221 MRI JOINT UPR EXTREM W/O DYE: CPT

## 2022-01-31 NOTE — RESULT ENCOUNTER NOTE
MRI images are only available for his right shoulder, left images not uploaded. Please contact radiology to have left images uploaded as well.   We can then schedule him for a follow-up visit to go over his MRI

## 2022-02-01 ENCOUNTER — TELEPHONE (OUTPATIENT)
Dept: ORTHOPEDIC SURGERY | Age: 52
End: 2022-02-01

## 2022-02-01 NOTE — TELEPHONE ENCOUNTER
Patient was called and VM left -- needs to schedule [facility] MRI review --

## 2022-02-01 NOTE — RESULT ENCOUNTER NOTE
Left Shld MRI results are in system.     Patient called and VM left to call / schedule MRI results review

## 2022-02-01 NOTE — TELEPHONE ENCOUNTER
----- Message from Ana Velazquez MD sent at 1/31/2022  4:47 PM EST -----  MRI images are only available for his right shoulder, left images not uploaded. Please contact radiology to have left images uploaded as well.   We can then schedule him for a follow-up visit to go over his MRI

## 2022-02-07 ENCOUNTER — OFFICE VISIT (OUTPATIENT)
Dept: ORTHOPEDIC SURGERY | Age: 52
End: 2022-02-07
Payer: COMMERCIAL

## 2022-02-07 VITALS — BODY MASS INDEX: 29.09 KG/M2 | WEIGHT: 234 LBS | HEIGHT: 75 IN

## 2022-02-07 DIAGNOSIS — S43.006A DISLOCATION OF SHOULDER REGION, UNSPECIFIED LATERALITY, INITIAL ENCOUNTER: Primary | ICD-10-CM

## 2022-02-07 PROCEDURE — 99213 OFFICE O/P EST LOW 20 MIN: CPT | Performed by: ORTHOPAEDIC SURGERY

## 2022-02-07 RX ORDER — METOPROLOL SUCCINATE 50 MG/1
TABLET, EXTENDED RELEASE ORAL
COMMUNITY
Start: 2022-01-23

## 2022-02-07 RX ORDER — ACETAMINOPHEN AND CODEINE PHOSPHATE 300; 30 MG/1; MG/1
TABLET ORAL
COMMUNITY
Start: 2022-01-05 | End: 2022-08-11

## 2022-02-07 RX ORDER — AMOXICILLIN 500 MG/1
CAPSULE ORAL
COMMUNITY
End: 2022-08-11

## 2022-02-07 RX ORDER — ALBUTEROL SULFATE 90 UG/1
AEROSOL, METERED RESPIRATORY (INHALATION)
COMMUNITY
End: 2022-08-11

## 2022-02-07 NOTE — PROGRESS NOTES
Follow Up Visit     Angela Francisco returns today for follow up visit regarding Bilateral shoulder reverse Hill-Sachs defect from presumed posterior dislocations after suffering new onset seizures December 18. Treatment thus far has included Bilateral shoulder MRIs are indicated due to significant injury of both shoulders. Nicole Martinez He is here today to review MRI of both shoulders. He reports symptoms are unchanged. Right shoulder is more bothersome than the left. He is not having much pain but mainly feels stiffness    Physical Exam:     Height: 6' 3\" (1.905 m), Weight: 234 lb (106.1 kg) (per pt)    General: Alert and oriented x3, no acute distress  Cardiovascular/pulmonary: No labored breathing, peripheral perfusion intact  Musculoskeletal:    Exam of the right shoulder shows he can elevate about 90 degrees, passively I can increase him to about 120 with mild stiffness. Internal rotation is to the right hip. Belly press test reveals poor motion minimal pain. He does have fairly good strength with Umang test.    Exam of the left shoulder shows slightly better range of motion can elevate about 110 degrees, passively can increase to 130. Internal rotation likewise is to approximately the left hip. He has restricted motion with belly press test    Controlled Substances Monitoring:      Imaging:  MRIs of both shoulders reviewed. This shows right greater than left impaction of the lesser tuberosity with overlying subscapularis intact, but likely subluxation of the biceps tendon into the defect. Superior and posterior cuff appears to be intact      Assessment: Bilateral reverse Hill-Sachs defects secondary to posterior dislocation      Plan:   We discussed both shoulders today. He has been on seizure medication has not yet seen the neurologist.  We discussed nonoperative as well as operative management for his shoulder.   I would like him to try some physical therapy to help with range of motion, ultimately we may be able to avoid surgical management. We discussed surgery would involve likely salvador-cap vs resurfacing versus hemiarthroplasty for the sizable defect, as well as biceps tenodesis. We do not burn any bridges by having him to try some PT as we may avoid surgical management if he can improve range of motion and if his pain remains manageable. We will check him back in about 6 weeks.     Sandy Jensen MD  Orthopaedic Surgery   2/7/22  3:10 PM

## 2022-02-21 ENCOUNTER — OFFICE VISIT (OUTPATIENT)
Dept: NEUROLOGY | Age: 52
End: 2022-02-21
Payer: COMMERCIAL

## 2022-02-21 VITALS
WEIGHT: 234 LBS | TEMPERATURE: 97.6 F | BODY MASS INDEX: 29.09 KG/M2 | HEIGHT: 75 IN | OXYGEN SATURATION: 96 % | SYSTOLIC BLOOD PRESSURE: 136 MMHG | HEART RATE: 78 BPM | DIASTOLIC BLOOD PRESSURE: 70 MMHG

## 2022-02-21 DIAGNOSIS — R56.9 NOCTURNAL SEIZURE (HCC): ICD-10-CM

## 2022-02-21 DIAGNOSIS — G47.33 OSA (OBSTRUCTIVE SLEEP APNEA): Primary | ICD-10-CM

## 2022-02-21 PROCEDURE — 1111F DSCHRG MED/CURRENT MED MERGE: CPT | Performed by: NURSE PRACTITIONER

## 2022-02-21 PROCEDURE — 99214 OFFICE O/P EST MOD 30 MIN: CPT | Performed by: NURSE PRACTITIONER

## 2022-02-21 RX ORDER — LEVETIRACETAM 1000 MG/1
1000 TABLET ORAL 2 TIMES DAILY
Qty: 60 TABLET | Refills: 11 | Status: SHIPPED
Start: 2022-02-21 | End: 2022-06-27 | Stop reason: SINTOL

## 2022-02-21 RX ORDER — ASPIRIN 81 MG/1
81 TABLET ORAL DAILY
COMMUNITY

## 2022-02-21 NOTE — PROGRESS NOTES
[facility]. León Lyons M.D., F.A.C.P. Azalea Arboleda, DNP, APRN, ACNS-BC  Barbie Chowdhury. Sandra Lemon, MSN, APRN-FNP-C  Mindy Philippe, MSN, APRN-FNP-C  RENE Alfredo, PA-C  Swapna Wong, MSN, APRN-FNP-C  [address] [place], [address] Nasreen Agustin  Phone: [phone]  Fax: [phone]         Tiarra Enriquez is a 46 y.o. right handed male     Patient is a hospital follow-up for seizures    He is accompanied by his wife today    Presented to ED for evaluation of an episode of shaking from sleep. The patient's wife had recorded video of the event prompting his admission. Prior to the beginning of the video she states he had had extension of all limbs followed by flexion of the arms with shaking. The video then demonstrates stertorous breathing with blood from a bit on the interior of the lower lip running down his cheek. He began trying to sit up, but was still disoriented. After this the patient reportedly wandered the house somewhat confusedly for about 15 minutes.      The patient had just been discharged home from the hospital the day prior. The initial suspicion had been for sleep apnea, but the patient had some memory loss around this episode as well as the one prompting the current admission. A prior episode of generalized convulsions from sleep also occurred in October 2022, but this was thought to be related to an infection at that time. This admission he is also noted to have an abrasion from the bite on his interior lower lip as well as bilateral humeral fractures proximally.     He has no history of seizures. No history of birth complications, developmental delay, learning disabilities, concussion with LOC, stroke, CNS neoplasm or infection. MRI brain completed during his hospital stay showed slightly smaller left parietal area but no acute findings. EEG was normal.  Patient was started on Keppra 1000 mg twice daily and reports no seizures. Patient wife discussed possible DEMETRIUS due to intermittent snoring if patient lies on his back, patient has never been worked up for DEMETRIUS.       No chest pain or palpitations  No coughing or wheezing    No vertigo, lightheadedness or loss of consciousness  No falls, tripping or stumbling  No incontinence of bowels or bladder  No itching or bruising appreciated  No numbness, tingling or focal arm/leg weakness    ROS otherwise negative      Objective:     /70 (Site: Right Upper Arm)   Pulse 78   Temp 97.6 °F (36.4 °C)   Ht 6' 3\" (1.905 m)   Wt 234 lb (106.1 kg)   SpO2 96%   BMI 29.25 kg/m²   Afebrile     General appearance: alert, appears stated age, cooperative and no distress  Head: normocephalic, without obvious abnormality, atraumatic  Eyes: conjunctivae/corneas clear  Neck: supple, symmetrical, trachea midline   Lungs: clear to auscultation bilaterally  Heart: regular rate and rhythm  Extremities:  normal, atraumatic, no cyanosis or edema  Skin: color, texture, turgor normal---no rashes or lesions       Mental Status: Alert, oriented, thought content appropriate    Appropriate attention/concentration  Intact fundus of knowledge  Intact memories  Repetition intact    Speech: No dysarthria  Language: No aphasias      Cranial Nerves:  I: smell NA   II: visual acuity  NA   II: visual fields Full to confrontation   II: pupils BRAD   III,VII: ptosis None   III,IV,VI: extraocular muscles  Full ROM   V: mastication Normal   V: facial light touch sensation  Normal   V,VII: corneal reflex     VII: facial muscle function - upper  Normal   VII: facial muscle function - lower Normal   VIII: hearing Normal   IX: soft palate elevation  Normal   IX,X: gag reflex    XI: trapezius strength  5/5   XI: sternocleidomastoid strength 5/5   XI: neck extension strength  5/5   XII: tongue strength  Normal     Motor:  5/5 throughout  Normal bulk and tone  Difficulty holding BUE up due to shoulder fractures bilaterally  No abnormal movements    Sensory:  LT normal    Coordination:   FN, FFM and TATYANA normal    Gait:  Normal    DTR:   2+    Laboratory/Radiology:     CBC with Differential:    Lab Results   Component Value Date    WBC 6.7 12/21/2021    RBC 4.82 12/21/2021    HGB 11.3 12/21/2021    HCT 35.2 12/21/2021     12/21/2021    MCV 73.0 12/21/2021    MCH 23.4 12/21/2021    MCHC 32.1 12/21/2021    RDW 16.5 12/21/2021    LYMPHOPCT 12.4 12/21/2021    MONOPCT 8.7 12/21/2021    BASOPCT 0.4 12/21/2021    MONOSABS 0.58 12/21/2021    LYMPHSABS 0.83 12/21/2021    EOSABS 0.07 12/21/2021    BASOSABS 0.03 12/21/2021     CMP:    Lab Results   Component Value Date     12/21/2021    K 3.8 12/21/2021    K 4.2 12/19/2021     12/21/2021    CO2 19 12/21/2021    BUN 19 12/21/2021    CREATININE 0.9 12/21/2021    GFRAA >60 12/21/2021    LABGLOM >60 12/21/2021    GLUCOSE 140 12/21/2021    PROT 6.6 12/21/2021    LABALBU 3.1 12/21/2021    CALCIUM 8.8 12/21/2021    BILITOT 0.4 12/21/2021    ALKPHOS 56 12/21/2021    AST 26 12/21/2021    ALT 22 12/21/2021     Hepatic Function Panel:    Lab Results   Component Value Date    ALKPHOS 56 12/21/2021    ALT 22 12/21/2021    AST 26 12/21/2021    PROT 6.6 12/21/2021    BILITOT 0.4 12/21/2021    LABALBU 3.1 12/21/2021     MRI brain: Negative for acute findings, abnormal left parietal region which appears smaller than right parietal    EEG: Normal    All labs and imaging studies reviewed independently today    Assessment:     Nocturnal tonic clonic seizures   --- No history of seizures or family history of seizures  --- MRI brain abnormal left parietal region likely focal  --- Neuro exam unremarkable  --- No reported seizures and continues on Keppra 1000 mg twice daily    Possible DEMETRIUS  --- Wife reports episodes of intermittent snoring when patient lies on his back    Plan:     Continue Keppra 1000 mg twice daily    Referral to sleep study    Follow-up in 6-month    Call if any seizures occur or with any questions or concerns      Linda Rollins, APRN - CNP  3:57 PM  2/21/2022

## 2022-03-23 ENCOUNTER — OFFICE VISIT (OUTPATIENT)
Dept: ORTHOPEDIC SURGERY | Age: 52
End: 2022-03-23
Payer: COMMERCIAL

## 2022-03-23 VITALS — HEIGHT: 75 IN | BODY MASS INDEX: 29.22 KG/M2 | WEIGHT: 235 LBS

## 2022-03-23 DIAGNOSIS — S43.006A DISLOCATION OF SHOULDER REGION, UNSPECIFIED LATERALITY, INITIAL ENCOUNTER: Primary | ICD-10-CM

## 2022-03-23 PROCEDURE — 99213 OFFICE O/P EST LOW 20 MIN: CPT | Performed by: ORTHOPAEDIC SURGERY

## 2022-03-23 RX ORDER — OXYCODONE HYDROCHLORIDE 5 MG/1
TABLET ORAL
COMMUNITY
Start: 2022-02-10 | End: 2022-08-11

## 2022-03-23 NOTE — PROGRESS NOTES
made changes where appropriate/necessary.           Obdulia Peña MD  [facility]

## 2022-03-25 ENCOUNTER — TELEPHONE (OUTPATIENT)
Dept: ADMINISTRATIVE | Age: 52
End: 2022-03-25

## 2022-03-25 NOTE — TELEPHONE ENCOUNTER
Pt called and said Jeanine Keating referred him for a sleep consult, but he would like to have a sleep study and not the consult. He has been unable to reach the office directly after multiple attempts. Please contact pt.

## 2022-03-25 NOTE — TELEPHONE ENCOUNTER
He needs evaluated by Sleep medicine to see if he even needs a sleep study. Can you reach out to them and see if they have tried to call him or will see him soon?  Thanks

## 2022-04-26 ENCOUNTER — TELEPHONE (OUTPATIENT)
Dept: SLEEP CENTER | Age: 52
End: 2022-04-26

## 2022-04-26 NOTE — TELEPHONE ENCOUNTER
LMOM reminding pt of VV on 4/27   Link sent via text and email. Explained to pt how to use link for visit.   Asked pt to call to prechart

## 2022-04-27 ENCOUNTER — TELEMEDICINE (OUTPATIENT)
Dept: SLEEP MEDICINE | Age: 52
End: 2022-04-27
Payer: COMMERCIAL

## 2022-04-27 ENCOUNTER — TELEPHONE (OUTPATIENT)
Dept: SLEEP CENTER | Age: 52
End: 2022-04-27

## 2022-04-27 DIAGNOSIS — G47.33 OBSTRUCTIVE SLEEP APNEA: Primary | ICD-10-CM

## 2022-04-27 DIAGNOSIS — R56.9 SEIZURES (HCC): ICD-10-CM

## 2022-04-27 PROCEDURE — 99203 OFFICE O/P NEW LOW 30 MIN: CPT | Performed by: NURSE PRACTITIONER

## 2022-04-27 ASSESSMENT — SLEEP AND FATIGUE QUESTIONNAIRES
HOW LIKELY ARE YOU TO NOD OFF OR FALL ASLEEP WHILE SITTING AND TALKING TO SOMEONE: 0
HOW LIKELY ARE YOU TO NOD OFF OR FALL ASLEEP WHILE LYING DOWN TO REST IN THE AFTERNOON WHEN CIRCUMSTANCES PERMIT: 2
NECK CIRCUMFERENCE (INCHES): 17
HOW LIKELY ARE YOU TO NOD OFF OR FALL ASLEEP WHILE WATCHING TV: 2
HOW LIKELY ARE YOU TO NOD OFF OR FALL ASLEEP WHILE SITTING QUIETLY AFTER LUNCH WITHOUT ALCOHOL: 1
HOW LIKELY ARE YOU TO NOD OFF OR FALL ASLEEP WHEN YOU ARE A PASSENGER IN A CAR FOR AN HOUR WITHOUT A BREAK: 3
HOW LIKELY ARE YOU TO NOD OFF OR FALL ASLEEP IN A CAR, WHILE STOPPED FOR A FEW MINUTES IN TRAFFIC: 1
HOW LIKELY ARE YOU TO NOD OFF OR FALL ASLEEP WHILE SITTING INACTIVE IN A PUBLIC PLACE: 1
ESS TOTAL SCORE: 12
HOW LIKELY ARE YOU TO NOD OFF OR FALL ASLEEP WHILE SITTING AND READING: 2

## 2022-04-27 NOTE — PROGRESS NOTES
Telemedicine    Flor Cotter is a 46 y.o. male being evaluated by a Virtual Visit (video visit) encounter to address concerns as mentioned below. A caregiver was present when appropriate. Due to this being a TeleHealth encounter (During OMWVW-53 public health emergency), evaluation of the following organ systems was limited: Vitals/Constitutional/EENT/Resp/CV/GI//MS/Neuro/Skin/Heme-Lymph-Imm. Pursuant to the emergency declaration under the [facility], 1135 waiver authority and the [employer] [facility] [employer] 8980-49R, this Virtual Visit was conducted with patient's (and/or legal guardian's) consent, to reduce the patient's risk of exposure to COVID-19 and provide necessary medical care. The patient (and/or legal guardian) has also been advised to contact this office for worsening conditions or problems, and seek emergency medical treatment and/or call 911 if deemed necessary. The patient (and/or legal guardian if applicable) is aware that this is a billable service, which includes applicable co-pays. This virtual visit was conducted with patient's (and/or legal guardian's) consent. Services were provided through a video synchronous discussion virtually to substitute for in-person clinic visit. Patient and provider were both  located remotely. Patient identification was confirmed by 2 forms of personal forms of identification (Birthday and Patient's address). The patient was located in a state where the provider was licensed to provide care. YOB: 1970  Address: [address] [place] [address]       Services were provided through a video synchronous discussion virtually to substitute for in-person clinic visit. An electronic signature was used to authenticate this note.   Start time: 1:00 pm  End time: 1:32 pm       [facility] Medicine    Patient Name: Lucy Taylor  Age: 46 y.o.   : 1970    Date of Visit: 22      HPI   Lucy Taylor is a 46 y.o. male with  has a past medical history of DVT (deep venous thrombosis) ([facility]) and Lupus anticoagulant disorder ([address] He presents as a new patient to Sleep Clinic, referred by Ty Paniagua CNP, for suspected sleep apnea. Patient presents to establish with sleep medicine, referred by neurology, for a work-up to rule out sleep apnea. Within the last year, patient has recently experienced several seizures that seem to have been in the early morning(4-5 am), after waking up. He was hospitalized several months ago, had a neurology work-up, and is currently on Keppra daily. He has not had a sleep study in the past, but displays symptoms of sleep apnea. Patient also has a history of a lupus anticoagulant clotting disorder, in which he has been taking warfarin long term. He also has a history of an aortic valve replacement in 2019. No known family history of sleep apnea. Patient states that he does snore loudly and also has witnessed nocturnal apneas from what his wife says. There are times he will wake up with dull a.m. headaches and dry mouth and lately, since starting the Keppra, has found that he has increased brain fog and memory issues. He denies drowsy driving in the past.    Patient estimates that he receives around 7 hours of sleep each night. He will sometimes nap after work in the evening briefly. He sleeps in a bed, with his wife, usually on his sides. If he spends time sleeping on his back, his wife usually will wake him up and tell him that he is snoring more aggressively while sleeping on his back. He does not express difficulty initiating sleep and will wake up a few times throughout the night to use the restroom but can fall back asleep quickly. Does not take medications for sleep.   Sometimes, he leaves the TV on to help him fall asleep at night. Patient works day shift, an in office, for [employer]. He drinks 1 to 2 cups of caffeinated coffee through the day and denies tobacco or excessive alcohol use. Patient's weight remained stable this past year. Patient does not necessarily feel he gets poor quality sleep, however, he is willing to perform a sleep study to rule out sleep apnea. Sleep Study History: No previous sleep study. 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 N  Tonsillectomy? N   Have you lost or gained weight recently?  Stable       PARASOMNIAS/ NARCOLEPSY:  Hypnogogic/Hynopompic Hallucinations: N   Sleep paralysis: Rare  Cataplexy: N   REM behavior symptoms: N  Nightmare: N   Sleep Walking: N  Sleep Talking: N  RLS Symptoms: Rare    STOP-BANG score of 7/8 for  (Y)snoring, (Y)daytime fatigue, (Y)observed apneas, (Y)high blood pressure, (N)BMI>35, (Y)age >50, (y)neck circumference >15in, (Y)male gender      Sleep Medicine 4/27/2022 9/11/2019   Sitting and reading 2 1   Watching TV 2 1   Sitting, inactive in a public place (e.g. a theatre or a meeting) 1 0   As a passenger in a car for an hour without a break 3 0   Lying down to rest in the afternoon when circumstances permit 2 1   Sitting and talking to someone 0 0   Sitting quietly after a lunch without alcohol 1 0   In a car, while stopped for a few minutes in traffic 1 0   Total score 12 3   Neck circumference (Inches) 17 16         PMH:  Past Medical History:   Diagnosis Date    DVT (deep venous thrombosis) (HCC)     Lupus anticoagulant disorder (HCC)         PSH:  Past Surgical History:   Procedure Laterality Date    AORTIC VALVE REPLACEMENT  01/2019    CHOLECYSTECTOMY      HERNIA REPAIR      x 2        Soc Hx:  Social History     Tobacco Use    Smoking status: Never Smoker    Smokeless tobacco: Never Used   Vaping Use    Vaping Use: Never used   Substance Use Topics    Alcohol use: Not Currently    Drug use: Never        Fam Hx:  History reviewed. No pertinent family history. Current Outpatient Medications   Medication Sig Dispense Refill    oxyCODONE (ROXICODONE) 5 MG immediate release tablet TAKE ONE TABLET BY MOUTH EVERY 4 HOURS (Patient not taking: Reported on 3/23/2022)      aspirin 81 MG EC tablet Take 81 mg by mouth daily      levETIRAcetam (KEPPRA) 1000 MG tablet Take 1 tablet by mouth 2 times daily Start taking this dose after finishing the 500 bid dose for 5 days 60 tablet 11    acetaminophen-codeine (TYLENOL #3) 300-30 MG per tablet TAKE 1 TABLET BY MOUTH EVERY 6 HOURS (Patient not taking: Reported on 2/21/2022)      albuterol-ipratropium (COMBIVENT RESPIMAT)  MCG/ACT AERS inhaler Combivent Respimat 20 mcg-100 mcg/actuation solution for inhalation   Inhale 2 puffs every 4 hours by inhalation route.  (Patient not taking: Reported on 2/7/2022)      albuterol sulfate  (90 Base) MCG/ACT inhaler albuterol sulfate HFA 90 mcg/actuation aerosol inhaler      amoxicillin (AMOXIL) 500 MG capsule amoxicillin 500 mg capsule   dental procedure on tues 07/28      vitamin D3 (CHOLECALCIFEROL) 125 MCG (5000 UT) TABS tablet Vitamin D3 125 mcg (5,000 unit) tablet   Take 1 tablet every day by oral route.  metoprolol succinate (TOPROL XL) 50 MG extended release tablet TAKE ONE TABLET BY MOUTH EVERY DAY      warfarin (COUMADIN) 7.5 MG tablet Take 7.5 mg by mouth Five times weekly on Sunday, Monday, Tuesday, Thursday and Friday      dilTIAZem (CARDIZEM CD) 120 MG extended release capsule Take 120 mg by mouth daily      fluticasone (FLONASE) 50 MCG/ACT nasal spray 2 sprays in each nostril bid 1 Bottle 5    warfarin (COUMADIN) 5 MG tablet Take 5 mg by mouth Twice a Week On Wednesday and Saturday       No current facility-administered medications for this visit. Review of Systems  (Sleep ROS above)     Constitutional: no chills, no fever   Eyes: no blurred vision   Cardiovascular: no chest pain  Respiratory: no cough, no shortness of breath   Gastrointestinal:  no nausea,  no vomiting, no diarrhea. Neurological:  no dizziness,  no headache, no memory changes  Endocrine: No feelings of weakness    Objective:   Physical Exam  There were no vitals taken for this visit. Additional Measurements    04/27/22 1342   Neck circumference (Inches): 17       General: No acute distress. BMI of There is no height or weight on file to calculate BMI. HEENT: Normocephalic, atraumatic. No oropharyngeal lesions. Neck circumference (Inches): 17  Mallampati class- 2  Tonsils- 1  Neck: Trachea midline  Lungs: Able to speak in full sentences. Psychiatric: Alert and oriented. Appropriate affect.      PERTINENT LAB RESULTS  No results found for: TSH   Ferritin   Date Value Ref Range Status   10/09/2021 110 ng/mL Final     Comment:     FERRITIN Reference Ranges:  Adult Males   20 - 60 years:    27 - 400 ng/mL  Adult females 16 - 61 years:    15 - 150 ng/mL  Adults greater than 60 years:   no established reference range  Pediatrics:                     no established reference range          Assessment:      Diagnoses and all orders for this visit:    Obstructive sleep apnea  -     Covid-19 Ambulatory; Future  -     Baseline Diagnostic Sleep Study; Future    Seizures ([facility])    ·    Plan:      Darryn Quiles is a 46 y.o. male with  has a past medical history of DVT (deep venous thrombosis) ([facility]) and Lupus anticoagulant disorder ([facility] [address] He presents as a new patient to Sleep Clinic, referred by neurology, with symptoms suggestive of suspected obstructive sleep apnea (primarily snoring, nocturnal apneas, a.m. headaches, difficulty with memory, new onset seizures in the morning). An in lab sleep study will be performed, followed by possible PAP therapy, if positive. 1. Obstructive Sleep Apnea     -Multiple risk factors with STOP-BANG 7/8. Will proceed with in-lab split-night polysomnography. Order placed today for [address].  -Discussed pathophysiology of DEMETRIUS and its impact on daily well-being, as well as cardiometabolic and neurocognitive health (particularly in moderate-severe cases). Discussed how sleep apnea can be connected with seizures and how treatment is crucial if it found on sleep study.  -Discussed CPAP as first-line and gold-standard therapy for DEMETRIUS should diagnosis be confirmed. Patient understands that CPAP should be worn every night for the duration of the night for maximum benefit.   -Patient willing to proceed with CPAP treatment if indicated based on sleep study. Will order CPAP if indicated on sleep study  -Reviewed new PAP Therapy instructions to be compliant with most insurance coverage:  - Use PAP device for atleast 4 hours nightly. - PAP therapy must be used for 70% of nights (5/7 nights per week)  - Patient must follow up in the clinic within 30-90 days from getting the PAP device.   -Provided handouts on DEMETRIUS, and CPAP. -Informed patient to call office if he does not hear from sleep lab about scheduling within 1 to 2 weeks. 2.  Seizures     -Following with neurology.  -Stable on Keppra.   -Referred by neurology to rule out sleep apnea as a contributing factor of new onset seizures. Patient will follow up Return in about 4 months (around 8/27/2022) for Sleep Study Results, CPAP compliance, Follow up for sleep apnea.     Noah Izaguirre, TERRIE-[address]  P -[phone] option 2  - [phone]

## 2022-06-24 ENCOUNTER — TELEPHONE (OUTPATIENT)
Dept: ADMINISTRATIVE | Age: 52
End: 2022-06-24

## 2022-06-24 NOTE — TELEPHONE ENCOUNTER
Pt called and said he thinks he is having a problem with his seizure medication and wondering if it needs to be changed. He is having concentration and speech problems. Offered to schedule an appt, but pt requested if someone can call him back about his meds. Please contact pt.

## 2022-06-27 DIAGNOSIS — R56.9 NOCTURNAL SEIZURE (HCC): Primary | ICD-10-CM

## 2022-06-27 RX ORDER — BRIVARACETAM 50 MG/1
50 TABLET, FILM COATED ORAL 2 TIMES DAILY
Qty: 60 TABLET | Refills: 6 | Status: SHIPPED
Start: 2022-06-27 | End: 2022-07-27

## 2022-07-30 ENCOUNTER — HOSPITAL ENCOUNTER (OUTPATIENT)
Dept: SLEEP CENTER | Age: 52
Discharge: HOME | End: 2022-07-30
Payer: COMMERCIAL

## 2022-07-30 DIAGNOSIS — G47.33 OBSTRUCTIVE SLEEP APNEA: ICD-10-CM

## 2022-07-30 PROCEDURE — 95810 POLYSOM 6/> YRS 4/> PARAM: CPT

## 2022-07-31 VITALS — HEIGHT: 75 IN | OXYGEN SATURATION: 94 % | WEIGHT: 235 LBS | BODY MASS INDEX: 29.22 KG/M2 | HEART RATE: 70 BPM

## 2022-07-31 ASSESSMENT — SLEEP AND FATIGUE QUESTIONNAIRES
HOW LIKELY ARE YOU TO NOD OFF OR FALL ASLEEP WHILE SITTING QUIETLY AFTER LUNCH WITHOUT ALCOHOL: 1
HOW LIKELY ARE YOU TO NOD OFF OR FALL ASLEEP WHILE SITTING AND READING: 1
ESS TOTAL SCORE: 11
HOW LIKELY ARE YOU TO NOD OFF OR FALL ASLEEP WHILE WATCHING TV: 2
HOW LIKELY ARE YOU TO NOD OFF OR FALL ASLEEP WHILE SITTING INACTIVE IN A PUBLIC PLACE: 1
HOW LIKELY ARE YOU TO NOD OFF OR FALL ASLEEP WHILE SITTING AND TALKING TO SOMEONE: 1
HOW LIKELY ARE YOU TO NOD OFF OR FALL ASLEEP IN A CAR, WHILE STOPPED FOR A FEW MINUTES IN TRAFFIC: 1
HOW LIKELY ARE YOU TO NOD OFF OR FALL ASLEEP WHILE LYING DOWN TO REST IN THE AFTERNOON WHEN CIRCUMSTANCES PERMIT: 2
HOW LIKELY ARE YOU TO NOD OFF OR FALL ASLEEP WHEN YOU ARE A PASSENGER IN A CAR FOR AN HOUR WITHOUT A BREAK: 2

## 2022-08-05 ENCOUNTER — TELEPHONE (OUTPATIENT)
Dept: SLEEP CENTER | Age: 52
End: 2022-08-05

## 2022-08-05 DIAGNOSIS — G47.33 OBSTRUCTIVE SLEEP APNEA: Primary | ICD-10-CM

## 2022-08-05 NOTE — PROGRESS NOTES
the time with  saturation at or greater than 90%. Ninety three percent of the time,  saturation was less than 90%. HEART RATE SUMMARY:  Average heart rate was 64 beats per minute. Maximum heart rate was 83 beats per minute and minimum heart rate was 55  beats per minute. There were no ectopic beats noted. MISCELLANEOUS:  [place] Sleepiness Scale score is 11/24. Snoring was  moderate. This was graded as 5 on a 1 to 10 scale. There was no  apparent bruxism. IMPRESSION:  1. Mild obstructive sleep apnea. 2.  Nocturnal hypoxia. 3.  No cardiac dysrhythmia. 4.  Moderate snoring. DISCUSSION:  This patient has an apnea/hypopnea index of nine. Normal  is less than five and mild is 5 to 15, leaving this patient in the mild  category. Although we do not always treat mild disease, nocturnal  hypoxia and elevated [place] Sleepiness Scale scores (greater than 10)  are two indications for treatment. SUGGESTIONS:  1. TERRIE Pinto, CNP to discuss the results of study  with the patient. 2.  The patient should return to [facility] for positive  airway pressure titration. 3.  An alternative to titration would be auto-CPAP.         Adelso Cordon MD  Diplomat of Sleep Medicine    D: 08/04/2022 11:39:38       T: 08/04/2022 11:43:10     [email or web address]  Job#: [id number]     Doc#: [id number]    CC:  Myla Harrison MD

## 2022-08-05 NOTE — TELEPHONE ENCOUNTER
Pt ret call and discussed with him SS results (mild DEMETRIUS with significant hypoxia not related to events). I asked pt if he has ever seen a Pulmonologist.  He stated he saw one approx 2 years ago and was told there is nothing wrong with him. He does not feel that to be accurate because he still has episodes of hypoxia. He is willing to see a different Pulmonologist.  He would like to move his appointment up and come into the office to discuss his options. I did let him know that  does recommend starting on CPAP therapy. He would like to discuss if he can just go on O2 and not on CPAP.   Appt was moved up to 8/11 in the [place] office

## 2022-08-11 ENCOUNTER — OFFICE VISIT (OUTPATIENT)
Dept: SLEEP CENTER | Age: 52
End: 2022-08-11
Payer: COMMERCIAL

## 2022-08-11 VITALS
WEIGHT: 236.8 LBS | HEART RATE: 69 BPM | HEIGHT: 75 IN | RESPIRATION RATE: 16 BRPM | DIASTOLIC BLOOD PRESSURE: 87 MMHG | BODY MASS INDEX: 29.44 KG/M2 | OXYGEN SATURATION: 96 % | SYSTOLIC BLOOD PRESSURE: 136 MMHG

## 2022-08-11 DIAGNOSIS — G47.19 EXCESSIVE DAYTIME SLEEPINESS: ICD-10-CM

## 2022-08-11 DIAGNOSIS — R56.9 SEIZURES (HCC): ICD-10-CM

## 2022-08-11 DIAGNOSIS — G47.34 NOCTURNAL OXYGEN DESATURATION: ICD-10-CM

## 2022-08-11 DIAGNOSIS — G47.33 OBSTRUCTIVE SLEEP APNEA: Primary | ICD-10-CM

## 2022-08-11 PROCEDURE — 99213 OFFICE O/P EST LOW 20 MIN: CPT | Performed by: NURSE PRACTITIONER

## 2022-08-11 ASSESSMENT — SLEEP AND FATIGUE QUESTIONNAIRES
HOW LIKELY ARE YOU TO NOD OFF OR FALL ASLEEP WHILE SITTING AND READING: 2
HOW LIKELY ARE YOU TO NOD OFF OR FALL ASLEEP WHILE SITTING INACTIVE IN A PUBLIC PLACE: 2
HOW LIKELY ARE YOU TO NOD OFF OR FALL ASLEEP WHEN YOU ARE A PASSENGER IN A CAR FOR AN HOUR WITHOUT A BREAK: 2
HOW LIKELY ARE YOU TO NOD OFF OR FALL ASLEEP WHILE LYING DOWN TO REST IN THE AFTERNOON WHEN CIRCUMSTANCES PERMIT: 3
HOW LIKELY ARE YOU TO NOD OFF OR FALL ASLEEP WHILE SITTING AND TALKING TO SOMEONE: 1
HOW LIKELY ARE YOU TO NOD OFF OR FALL ASLEEP IN A CAR, WHILE STOPPED FOR A FEW MINUTES IN TRAFFIC: 1
HOW LIKELY ARE YOU TO NOD OFF OR FALL ASLEEP WHILE SITTING QUIETLY AFTER LUNCH WITHOUT ALCOHOL: 2
HOW LIKELY ARE YOU TO NOD OFF OR FALL ASLEEP WHILE WATCHING TV: 3
ESS TOTAL SCORE: 16

## 2022-08-11 NOTE — PATIENT INSTRUCTIONS
(high blood pressure in the lungs), cardiovascular morbidities (heart attack and stroke), car accidents and all cause mortality (increased risk of death).    ----------------------------------------------------------  Common Issues and Solutions     Pillow is dislodging mask - Consider getting a CPAP pillow or switching to a mask with the hose at the top. Dry Mouth - Adjust Humidity and/or try Biotene Gel. (Excessive leak can also cause this)     Leak - Please call your equipment provider  as they may need to adjust your mask. Difficulty tolerating the PAP mask - Contact your equipment company to try a different mask, we can order a \"mini sleep study\"with the sleep tech to try different masks/settings of pressure, also we have a sleep psychologist to help with anxiety related to wearing the PAP mask      ----------------------------------------------------------     For Questions and Concerns: In case of difficulty with your PAP device or mask interface, please FIRST contact your 2 [address] (The company who provides you the CPAP/BiPAP/ASV machine/supplies). If you need the number for any other [employer] company, please call my Nurse at [phone] option 2     For questions concerning your [place] appointment: Call [phone] option 2  SLEEP LAB SCHEDULING:        ----------------------------------------------------------     Helpful Web Sites: For patients with ALL SLEEP DISORDERS: American Academy of Sleep Medicine http://sleepeducation. org; or [employer]: https://sleepfoundation. org  For patients with DEMETRIUS: American Sleep Apnea Association: http://yanez.org/. org  For patients with RLS: RLS Foundation: Kurtis.es  For patients with INSOMNIA: https://www.mills.org/. org/articles/sleep/insomnia-causes-and-cures. htm  For patients with DEPRESSION: Mamina Shkola.com.ID.me/depression            Learning About CPAP for Sleep Apnea  What is CPAP? CPAP/Bi-PAP is a small machine that you use at home every night while you sleep. It increases air pressure in your throat to keep your airway open. When you have sleep apnea, this can help you sleep better so you feel much better. CPAP stands for \"continuous positive airway pressure. \" Bi-PAP is a different PAP setting that allows for different pressures when you breathe in and out. The CPAP/Bi-PAP machine will have one of the following:  A mask that covers your nose and mouth  Prongs that fit into your nose  A mask that covers your nose only, the most common type. This type is called NCPAP. The N stands for \"nasal.\"  Why is it done? CPAP is a common/effective treatment for obstructive sleep apnea. How does it help? CPAP can help you have more normal sleep, so you feel less sleepy and more alert during the daytime through the treatment of sleep apnea. CPAP may help keep heart failure or other heart problems from getting worse. CPAP may help lower your blood pressure. If you use CPAP, your bed partner may also sleep better because you are snoring less. What are the side effects? Some people who use CPAP have:  A dry or stuffy nose and a sore throat. Irritated skin on the face. Sore eyes. Bloating. If you have any of these problems, work with your doctor to fix them. Here are some things you can try:  Be sure the mask or nasal prongs fit well. See if your doctor can adjust the pressure of your CPAP. If your nose is dry, try a humidifier. If these things do not help, you might try a different type of machine. Some machines have air pressure that adjusts on its own. Others have air pressures that are different when you breathe in than when you breathe out. This may reduce discomfort caused by too much pressure in your nose. Where can you learn more? Go to https://chguyeb.[employer]. org and sign in to your [employer] account.  Enter T708 in the [employer] box to learn more about \"Learning About CPAP for Sleep Apnea. \"     If you do not have an account, please click on the \"Sign Up Now\" link. Current as of: February 24, 2020               Content Version: 12.5  © 2006-2020 Healthwise, Incorporated. Care instructions adapted under license by [facility] ([facility]). If you have questions about a medical condition or this instruction, always ask your healthcare professional. [place] [address] any warranty or liability for your use of this information.

## 2022-08-11 NOTE — PROGRESS NOTES
[facility] Sleep Medicine    Patient Name: Lauro Siemens  Age: 46 y.o.   : 1970    Date of Visit: 22        Review of Last Visit Summary:    The patient was last seen on 2022 for  Obstructive Sleep Apnea. Interim History:     Lauro Siemens is a 46 y.o. male that  has a past medical history of DVT (deep venous thrombosis) ([facility]) and Lupus anticoagulant disorder ([address] He presents as follow up to Sleep Clinic to review sleep study results. Interval Events:    -PSG performed on 22 showing a mild DEMETRIUS, but with significant hypoxia out of proportion to severity level of DEMETRIUS. Spent 93% of the night with O2 saturations less than 90%. -Patient denies smoking history, but did grow up with second hand smoke (father smoked). 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 No recent pulmonary evaluation.  -Last seizure around Melany- following with neurology. Currently not driving.   - Wife notes heavy snoring/apneas, but no new or worsening sleep symptoms.  -Open to treatment. Sleep Study History: 22- PSG @ [facility]- wt 235 lbs, sleep efficiency 68%, REM 28%, AHI 9, SpO2 yoli 85%, T<90% was 93% of TST. Sleep History:     Within the last year, patient has recently experienced several seizures that seem to have been in the early morning(4-5 am), after waking up. He was hospitalized several months ago, had a neurology work-up, and is currently on Keppra daily. He has not had a sleep study in the past, but displays symptoms of sleep apnea. Patient also has a history of a lupus anticoagulant clotting disorder, in which he has been taking warfarin long term. He also has a history of an aortic valve replacement in 2019. No known family history of sleep apnea. Patient states that he does snore loudly and also has witnessed nocturnal apneas from what his wife says. There are times he will wake up with dull a.m. headaches and dry mouth and lately, since starting the Keppra, has found that he has increased brain fog and memory issues. He denies drowsy driving in the past.     Patient estimates that he receives around 7 hours of sleep each night. He will sometimes nap after work in the evening briefly. He sleeps in a bed, with his wife, usually on his sides. If he spends time sleeping on his back, his wife usually will wake him up and tell him that he is snoring more aggressively while sleeping on his back. He does not express difficulty initiating sleep and will wake up a few times throughout the night to use the restroom but can fall back asleep quickly. Does not take medications for sleep. Sometimes, he leaves the TV on to help him fall asleep at night. Patient works day shift, an in office, for [employer]. He drinks 1 to 2 cups of caffeinated coffee through the day and denies tobacco or excessive alcohol use. Patient's weight remained stable this past year. Patient does not necessarily feel he gets poor quality sleep, however, he is willing to perform a sleep study to rule out sleep apnea.        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 N  Tonsillectomy? N  Have you lost or gained weight recently? Stable        PARASOMNIAS/ NARCOLEPSY:  Hypnogogic/Hynopompic Hallucinations: N  Sleep paralysis: Rare  Cataplexy: N  REM behavior symptoms: N  Nightmare: N  Sleep Walking: N  Sleep Talking: N  RLS Symptoms: Rare    Past Medical History:  Past Medical History:   Diagnosis Date    DVT (deep venous thrombosis) (HCC)     Lupus anticoagulant disorder [facility])        Past Surgical History:        Procedure Laterality Date    AORTIC VALVE REPLACEMENT  01/2019    CHOLECYSTECTOMY      HERNIA REPAIR      x 2       Allergies:  has No Known Allergies. Social History:    Social History     Tobacco Use    Smoking status: Never    Smokeless tobacco: Never   Vaping Use    Vaping Use: Never used   Substance Use Topics    Alcohol use: Not Currently    Drug use: Never        Family History:   History reviewed. No pertinent family history. Current Medications:    Current Outpatient Medications:     Brivaracetam (BRIVIACT) 50 MG tablet, Take 50 mg by mouth in the morning and 50 mg before bedtime. , Disp: , Rfl:     aspirin 81 MG EC tablet, Take 81 mg by mouth daily, Disp: , Rfl:     vitamin D3 (CHOLECALCIFEROL) 125 MCG (5000 UT) TABS tablet, Vitamin D3 125 mcg (5,000 unit) tablet  Take 1 tablet every day by oral route., Disp: , Rfl:     metoprolol succinate (TOPROL XL) 50 MG extended release tablet, TAKE ONE TABLET BY MOUTH EVERY DAY, Disp: , Rfl:     warfarin (COUMADIN) 7.5 MG tablet, Take 7.5 mg by mouth Five times weekly on Sunday, Monday, Tuesday, Thursday and Friday, Disp: , Rfl:     dilTIAZem (CARDIZEM CD) 120 MG extended release capsule, Take 120 mg by mouth daily, Disp: , Rfl:     fluticasone (FLONASE) 50 MCG/ACT nasal spray, 2 sprays in each nostril bid, Disp: 1 Bottle, Rfl: 5    Sleep Medicine 8/11/2022 7/31/2022 4/27/2022 9/11/2019   Sitting and reading 2 1 2 1   Watching TV 3 2 2 1   Sitting, inactive in a public place (e.g. a theatre or a meeting) 2 1 1 0   As a passenger in a car for an hour without a break 2 2 3 0   Lying down to rest in the afternoon when circumstances permit 3 2 2 1   Sitting and talking to someone 1 1 0 0   Sitting quietly after a lunch without alcohol 2 1 1 0   In a car, while stopped for a few minutes in traffic 1 1 1 0   Total score 16 11 12 3   Neck circumference (Inches) - 17 17 16       Review of Systems:    Constitutional: no chills, no fever   Eyes: no blurred vision   Cardiovascular: no chest pain,   Respiratory: no cough, no shortness of breath   Gastrointestinal:  no nausea,  no vomiting, no diarrhea. Musculoskeletal: no arthralgias, no back pain  Neurological:no dizziness,  no headache, no memory changes. Endocrine: No chills    Objective:   PHYSICAL EXAM:    /87   Pulse 69   Resp 16   Ht 6' 3\" (1.905 m)   Wt 236 lb 12.8 oz (107.4 kg)   SpO2 96%   BMI 29.60 kg/m²     Physical exam:  Gen: No acute distress. BMI of Body mass index is 29.6 kg/m². Neck: Trachea midline. No obvious mass. Neck circumference     Resp: No accessory muscle use. No crackles. No wheezes. No rhonchi. CV: Regular rate. Regular rhythm. No murmur or rub. Skin: Warm and dry. M/S: No cyanosis. No obvious joint deformity. Neuro: Awake. Alert. Moves all four extremities. Psych: Alert and oriented. No anxiety. Assessment:      Felice was seen today for sleep apnea. Diagnoses and all orders for this visit:    Obstructive sleep apnea  -     DME Order for CPAP as OP    Nocturnal oxygen desaturation  -     External Referral To Pulmonology    Excessive daytime sleepiness    Seizures ([facility] [address] [address]     Plan:       1. Obstructive Sleep Apnea     -PSG on 7/30/22 showed mild DEMETRIUS, AHI 9 with significant hypoxia. -Treatment for mild DEMETRIUS based on symptoms and co-morbidities.  Based on cardiac hx, seizures, symptoms, and hypoxia, treatment is recommended.  -Discussed pathophysiology of DEMETRIUS and its impact on daily well-being, as well as cardiometabolic and neurocognitive health (particularly in moderate-severe cases). -Discussed CPAP as first-line and gold-standard therapy for DEMETRIUS. Patient understands that CPAP should be worn every night for the duration of the night (in order to not miss therapy during early-morning REM period) for maximum benefit.  -Discussed titration versus AUTO CPAP trial. After discussion, patient elected to proceed with APAP trial. Will start APAP 5-20 cwp and follow up in 2 months. Overnight oximetry will be completed once control over DEMETRIUS is confirmed through data download. -Reviewed new PAP therapy instructions to be compliant with most insurance coverage:  -Use PAP device for at least 4 hours nightly. -PAP therapy must be used for 70% of nights (5/7 nights per week)  -Patient must follow up in the clinic within 30-90 days from getting the PAP device.   -Provided handouts on DEMETRIUS, CPAP, and sleep hygiene.  -Counseled on risks of driving while drowsy. 2. Nocturnal Hypoxia    -Spent 93% of TST with O2 saturations under 90%. -Exposure to unknown chemicals when in [employer] combat years ago. No recent pulmonary consult. Denies smoking history. Positive second hand smoke exposure.  -Will refer to pulmonary for evaluation, along with CPAP trial. Check overnight oximetry following confirmation of control of DEMETRIUS. 3. Excessive Daytime Sleepiness     -Elevated [place] 16/24. Likely secondary to untreated DEMETRIUS, hypoxia as above. Will assess for improvement with PAP therapy.  -Careful with drowsy driving, pull over if needed for 15 min at a safe location. 4. Seizures     -Following with neurology. -Referred by neurology to rule out sleep apnea as a contributing factor of new onset seizures.  -Last seizure in Dec. 2021. Not yet driving. Patient will follow up Return in about 2 months (around 10/11/2022) for CPAP compliance, Follow up for sleep apnea.     Art Bio, APRN-CNP  [address] [facility] -[phone] option 2  F- [phone]

## 2022-08-13 ENCOUNTER — APPOINTMENT (OUTPATIENT)
Dept: GENERAL RADIOLOGY | Age: 52
End: 2022-08-13
Payer: COMMERCIAL

## 2022-08-13 ENCOUNTER — APPOINTMENT (OUTPATIENT)
Dept: CT IMAGING | Age: 52
End: 2022-08-13
Payer: COMMERCIAL

## 2022-08-13 ENCOUNTER — HOSPITAL ENCOUNTER (EMERGENCY)
Age: 52
Discharge: HOME | End: 2022-08-13
Attending: EMERGENCY MEDICINE
Payer: COMMERCIAL

## 2022-08-13 VITALS
OXYGEN SATURATION: 94 % | TEMPERATURE: 97.9 F | DIASTOLIC BLOOD PRESSURE: 87 MMHG | RESPIRATION RATE: 16 BRPM | HEART RATE: 70 BPM | SYSTOLIC BLOOD PRESSURE: 127 MMHG

## 2022-08-13 DIAGNOSIS — R07.9 CHEST PAIN, UNSPECIFIED TYPE: Primary | ICD-10-CM

## 2022-08-13 DIAGNOSIS — K86.2 PANCREATIC CYST: ICD-10-CM

## 2022-08-13 LAB
ALBUMIN SERPL-MCNC: 4.2 G/DL (ref 3.5–5.2)
ALP BLD-CCNC: 59 U/L (ref 40–129)
ALT SERPL-CCNC: 13 U/L (ref 0–40)
ANION GAP SERPL CALCULATED.3IONS-SCNC: 10 MMOL/L (ref 7–16)
AST SERPL-CCNC: 19 U/L (ref 0–39)
BILIRUB SERPL-MCNC: 0.5 MG/DL (ref 0–1.2)
BILIRUBIN DIRECT: <0.2 MG/DL (ref 0–0.3)
BILIRUBIN, INDIRECT: NORMAL MG/DL (ref 0–1)
BUN BLDV-MCNC: 15 MG/DL (ref 6–20)
CALCIUM SERPL-MCNC: 9.2 MG/DL (ref 8.6–10.2)
CHLORIDE BLD-SCNC: 101 MMOL/L (ref 98–107)
CO2: 25 MMOL/L (ref 22–29)
CREAT SERPL-MCNC: 1.2 MG/DL (ref 0.7–1.2)
GFR AFRICAN AMERICAN: >60
GFR NON-AFRICAN AMERICAN: >60 ML/MIN/1.73
GLUCOSE BLD-MCNC: 97 MG/DL (ref 74–99)
HCT VFR BLD CALC: 39.2 % (ref 37–54)
HEMOGLOBIN: 12.5 G/DL (ref 12.5–16.5)
INR BLD: 2.8
LIPASE: 42 U/L (ref 13–60)
MCH RBC QN AUTO: 23.6 PG (ref 26–35)
MCHC RBC AUTO-ENTMCNC: 31.9 % (ref 32–34.5)
MCV RBC AUTO: 74.1 FL (ref 80–99.9)
PDW BLD-RTO: 16.4 FL (ref 11.5–15)
PLATELET # BLD: 217 E9/L (ref 130–450)
PMV BLD AUTO: 10.1 FL (ref 7–12)
POTASSIUM SERPL-SCNC: 4 MMOL/L (ref 3.5–5)
PRO-BNP: 1740 PG/ML (ref 0–125)
PROTHROMBIN TIME: 30.6 SEC (ref 9.3–12.4)
RBC # BLD: 5.29 E12/L (ref 3.8–5.8)
SODIUM BLD-SCNC: 136 MMOL/L (ref 132–146)
TOTAL PROTEIN: 7.5 G/DL (ref 6.4–8.3)
TROPONIN, HIGH SENSITIVITY: 12 NG/L (ref 0–11)
TROPONIN, HIGH SENSITIVITY: 14 NG/L (ref 0–11)
TROPONIN, HIGH SENSITIVITY: 18 NG/L (ref 0–11)
WBC # BLD: 9.1 E9/L (ref 4.5–11.5)

## 2022-08-13 PROCEDURE — 80076 HEPATIC FUNCTION PANEL: CPT

## 2022-08-13 PROCEDURE — 84484 ASSAY OF TROPONIN QUANT: CPT

## 2022-08-13 PROCEDURE — 36415 COLL VENOUS BLD VENIPUNCTURE: CPT

## 2022-08-13 PROCEDURE — 71275 CT ANGIOGRAPHY CHEST: CPT

## 2022-08-13 PROCEDURE — 85027 COMPLETE CBC AUTOMATED: CPT

## 2022-08-13 PROCEDURE — 71046 X-RAY EXAM CHEST 2 VIEWS: CPT

## 2022-08-13 PROCEDURE — 80048 BASIC METABOLIC PNL TOTAL CA: CPT

## 2022-08-13 PROCEDURE — 85610 PROTHROMBIN TIME: CPT

## 2022-08-13 PROCEDURE — 83880 ASSAY OF NATRIURETIC PEPTIDE: CPT

## 2022-08-13 PROCEDURE — 6370000000 HC RX 637 (ALT 250 FOR IP): Performed by: EMERGENCY MEDICINE

## 2022-08-13 PROCEDURE — 6360000004 HC RX CONTRAST MEDICATION: Performed by: RADIOLOGY

## 2022-08-13 PROCEDURE — 99285 EMERGENCY DEPT VISIT HI MDM: CPT

## 2022-08-13 PROCEDURE — 93005 ELECTROCARDIOGRAM TRACING: CPT | Performed by: PHYSICIAN ASSISTANT

## 2022-08-13 PROCEDURE — 74174 CTA ABD&PLVS W/CONTRAST: CPT

## 2022-08-13 PROCEDURE — 83690 ASSAY OF LIPASE: CPT

## 2022-08-13 PROCEDURE — 2580000003 HC RX 258: Performed by: RADIOLOGY

## 2022-08-13 RX ORDER — SUCRALFATE 1 G/1
1 TABLET ORAL ONCE
Status: COMPLETED | OUTPATIENT
Start: 2022-08-13 | End: 2022-08-13

## 2022-08-13 RX ORDER — SODIUM CHLORIDE 0.9 % (FLUSH) 0.9 %
10 SYRINGE (ML) INJECTION PRN
Status: DISCONTINUED | OUTPATIENT
Start: 2022-08-13 | End: 2022-08-14 | Stop reason: HOSPADM

## 2022-08-13 RX ADMIN — SUCRALFATE 1 G: 1 TABLET ORAL at 19:48

## 2022-08-13 RX ADMIN — ALUMINUM HYDROXIDE, MAGNESIUM HYDROXIDE, AND SIMETHICONE: 200; 200; 20 SUSPENSION ORAL at 19:48

## 2022-08-13 RX ADMIN — IOPAMIDOL 75 ML: 755 INJECTION, SOLUTION INTRAVENOUS at 21:15

## 2022-08-13 RX ADMIN — Medication 10 ML: at 21:15

## 2022-08-13 ASSESSMENT — PAIN SCALES - GENERAL: PAINLEVEL_OUTOF10: 4

## 2022-08-13 ASSESSMENT — PAIN - FUNCTIONAL ASSESSMENT: PAIN_FUNCTIONAL_ASSESSMENT: 0-10

## 2022-08-13 NOTE — ED PROVIDER NOTES
Daphnie Washington is a 46 y.o. male    Chief Complaint   Patient presents with    Chest Pain     Center chest pain since Thursday, hx of afib          HPI   Daphnie Washington is a 46 y.o. male presenting to the ED for Chest Pain (Center chest pain since Thursday, hx of afib )    History comes primarily from the patient. Patient presents for chest pain and general malaise for the last two days. Patient also states he was in afib starting yesterday afternoon but which resolved around noon today. He did take an extra dose of toprol this morning as he has previously been directed by his cardiologist at the [address] [place] [address]. Patients pain has been mild and constant in the lower substernal region which the patient thought might be related to acid reflux. Review of Systems   Constitutional:  Positive for fatigue. Negative for appetite change and fever. HENT:  Negative for congestion, rhinorrhea and trouble swallowing. Eyes:  Negative for redness and itching. Respiratory:  Negative for chest tightness, shortness of breath and wheezing. Cardiovascular:  Positive for chest pain and palpitations. Negative for leg swelling. Gastrointestinal:  Negative for abdominal distention, abdominal pain, constipation, diarrhea, nausea and vomiting. Genitourinary:  Negative for decreased urine volume, difficulty urinating and frequency. Musculoskeletal:  Negative for arthralgias, back pain and myalgias. Neurological:  Negative for dizziness, syncope, weakness, numbness and headaches. Psychiatric/Behavioral:  Negative for agitation, behavioral problems, confusion and decreased concentration. The patient is not nervous/anxious. All other systems reviewed and are negative. Physical Exam  Vitals reviewed. Constitutional:       General: He is not in acute distress. Appearance: Normal appearance. He is not ill-appearing. HENT:      Head: Normocephalic and atraumatic.       Right Ear: External ear normal. Left Ear: External ear normal.      Nose: Nose normal. No rhinorrhea. Mouth/Throat:      Mouth: Mucous membranes are moist.      Pharynx: Oropharynx is clear. Eyes:      Extraocular Movements: Extraocular movements intact. Conjunctiva/sclera: Conjunctivae normal.      Pupils: Pupils are equal, round, and reactive to light. Cardiovascular:      Rate and Rhythm: Normal rate and regular rhythm. Heart sounds: Normal heart sounds. No murmur heard. Pulmonary:      Effort: Pulmonary effort is normal. No respiratory distress. Breath sounds: Normal breath sounds. Abdominal:      General: Abdomen is flat. There is no distension. Tenderness: There is no abdominal tenderness. Musculoskeletal:         General: No swelling or tenderness. Normal range of motion. Cervical back: Normal range of motion. No rigidity or tenderness. Skin:     General: Skin is warm and dry. Findings: No rash. Neurological:      General: No focal deficit present. Mental Status: He is alert and oriented to person, place, and time. Cranial Nerves: No cranial nerve deficit. Motor: No weakness. Psychiatric:         Mood and Affect: Mood normal.         Behavior: Behavior normal.        Procedures     MDM     Amount and/or Complexity of Data Reviewed  Decide to obtain previous medical records or to obtain history from someone other than the patient: yes       Patient presented to the Emergency Department for Chest Pain (Center chest pain since Thursday, hx of afib )    Patient's workup reviewed and noted to have an elevated BNP and did require 3 troponin's. We did attempt treatement with sucralfate and gi cocktail without relief of his symptoms. Later in the visit, the patient does state that his pain is radiating into his back and so CTA chest and abdomen are added on. These show a pancreatic cyst that the patient is already aware of. Otherwise, no dissection noted on imaging.  After discussion with the patient, he will be discharged home with follow up with PCP and cardiology. EKG: This EKG is signed and interpreted by me. Rate: 70  Rhythm: sinus  Axis: normal  Interpretation: no acute changes  Comparison: stable as compared to patient's most recent EKG         --------------------------------------------- PAST HISTORY ---------------------------------------------  Past Medical History:  has a past medical history of DVT (deep venous thrombosis) ([facility]) and Lupus anticoagulant disorder ([facility] Past Surgical History:  has a past surgical history that includes hernia repair; Cholecystectomy; and Aortic valve replacement (01/2019). Social History:  reports that he has never smoked. He has never used smokeless tobacco. He reports that he does not currently use alcohol. He reports that he does not use drugs. Family History: family history is not on file. The patients home medications have been reviewed. Allergies: Patient has no known allergies.     -------------------------------------------------- RESULTS -------------------------------------------------  Labs:  Results for orders placed or performed during the hospital encounter of 60/97/86   Basic metabolic panel   Result Value Ref Range    Sodium 136 132 - 146 mmol/L    Potassium 4.0 3.5 - 5.0 mmol/L    Chloride 101 98 - 107 mmol/L    CO2 25 22 - 29 mmol/L    Anion Gap 10 7 - 16 mmol/L    Glucose 97 74 - 99 mg/dL    BUN 15 6 - 20 mg/dL    Creatinine 1.2 0.7 - 1.2 mg/dL    GFR Non-African American >60 >=60 mL/min/1.73    GFR African American >60     Calcium 9.2 8.6 - 10.2 mg/dL   CBC   Result Value Ref Range    WBC 9.1 4.5 - 11.5 E9/L    RBC 5.29 3.80 - 5.80 E12/L    Hemoglobin 12.5 12.5 - 16.5 g/dL    Hematocrit 39.2 37.0 - 54.0 %    MCV 74.1 (L) 80.0 - 99.9 fL    MCH 23.6 (L) 26.0 - 35.0 pg    MCHC 31.9 (L) 32.0 - 34.5 %    RDW 16.4 (H) 11.5 - 15.0 fL    Platelets 518 836 - 371 E9/L    MPV 10.1 7.0 - 12.0 fL   Troponin Result Value Ref Range    Troponin, High Sensitivity 12 (H) 0 - 11 ng/L   Protime-INR   Result Value Ref Range    Protime 30.6 (H) 9.3 - 12.4 sec    INR 2.8    Brain Natriuretic Peptide   Result Value Ref Range    Pro-BNP 1,740 (H) 0 - 125 pg/mL   Troponin   Result Value Ref Range    Troponin, High Sensitivity 18 (H) 0 - 11 ng/L   Lipase   Result Value Ref Range    Lipase 42 13 - 60 U/L   Hepatic Function Panel   Result Value Ref Range    Total Protein 7.5 6.4 - 8.3 g/dL    Albumin 4.2 3.5 - 5.2 g/dL    Alkaline Phosphatase 59 40 - 129 U/L    ALT 13 0 - 40 U/L    AST 19 0 - 39 U/L    Total Bilirubin 0.5 0.0 - 1.2 mg/dL    Bilirubin, Direct <0.2 0.0 - 0.3 mg/dL    Bilirubin, Indirect see below 0.0 - 1.0 mg/dL   Troponin   Result Value Ref Range    Troponin, High Sensitivity 14 (H) 0 - 11 ng/L   EKG 12 Lead   Result Value Ref Range    Ventricular Rate 70 BPM    Atrial Rate 70 BPM    P-R Interval 156 ms    QRS Duration 104 ms    Q-T Interval 398 ms    QTc Calculation (Bazett) 429 ms    P Axis 49 degrees    R Axis 9 degrees    T Axis 60 degrees       Radiology:  CTA CHEST W CONTRAST   Final Result   1. No evidence of pulmonary embolism. 2.  There is extensive patchy ground-glass density within the lungs   bilaterally concerning for atypical or viral airway disease such as COVID-19.      3.  There is a aortic valve present but no signs of aortic aneurysm or   dissection. 4.  The abdominal aorta appears unremarkable. 5.  There is a heterogeneous appearance, could represent signs of steatosis. 6.  Incidental low-density focus within the pancreas that could represent a   unilocular cysts but a side branch IPMN could give a similar appearance and   MRI may be of increased sensitivity. 7.  Ventral abdominal wall hernia         CTA ABDOMEN PELVIS W CONTRAST   Final Result   1. No evidence of pulmonary embolism.       2.  There is extensive patchy ground-glass density within the lungs   bilaterally concerning for atypical or viral airway disease such as COVID-19.      3.  There is a aortic valve present but no signs of aortic aneurysm or   dissection. 4.  The abdominal aorta appears unremarkable. 5.  There is a heterogeneous appearance, could represent signs of steatosis. 6.  Incidental low-density focus within the pancreas that could represent a   unilocular cysts but a side branch IPMN could give a similar appearance and   MRI may be of increased sensitivity. 7.  Ventral abdominal wall hernia         XR CHEST (2 VW)   Final Result   Subtle hazy opacities in left upper lung field and right and left lower lung   fields could suggest persistent subsegmental atelectasis, scarring, or   recurrent pneumonia. Short-term follow-up may be helpful for further   evaluation.             ------------------------- NURSING NOTES AND VITALS REVIEWED ---------------------------  Date / Time Roomed:  8/13/2022  5:05 PM  ED Bed Assignment:  40/40    The nursing notes within the ED encounter and vital signs as below have been reviewed. /87   Pulse 70   Temp 97.9 °F (36.6 °C) (Oral)   Resp 16   SpO2 94%   Oxygen Saturation Interpretation: Normal      ------------------------------------------ PROGRESS NOTES ------------------------------------------  8:07 PM EDT  I have spoken with the patient and discussed todays results, in addition to providing specific details for the plan of care and counseling regarding the diagnosis and prognosis. Their questions are answered at this time and they are agreeable with the plan. I discussed at length with them reasons for immediate return here for re evaluation.  They will followup with their  cardiologist and primary care physician by calling their office on Monday.      --------------------------------- ADDITIONAL PROVIDER NOTES ---------------------------------  At this time the patient is without objective evidence of an acute process requiring hospitalization or inpatient management. They have remained hemodynamically stable throughout their entire ED visit and are stable for discharge with outpatient follow-up. The plan has been discussed in detail and they are aware of the specific conditions for emergent return, as well as the importance of follow-up. Discharge Medication List as of 8/13/2022 11:17 PM          Diagnosis:  1. Chest pain, unspecified type    2. Pancreatic cyst        Disposition:  Patient's disposition: Discharge to home  Patient's condition is stable. Strict return precautions were discussed including but not limited too new or worsening symtpoms. They verbalized understanding and were agreeable with the plan. All questions were answered and patient was discharged. Hari Leon MD  Resident  08/15/22 2008    ATTENDING PROVIDER ATTESTATION:     Elfego Dale presented to the emergency department for evaluation of Chest Pain (Center chest pain since Thursday, hx of afib )   and was initially evaluated by the Medical Resident. See Original ED Note for H&P and ED course above. I have reviewed and discussed the case, including pertinent history (medical, surgical, family and social) and exam findings with the Medical Resident assigned to Elfego Dale. I have personally performed and/or participated in the history, exam, medical decision making, and procedures and agree with all pertinent clinical information. I have reviewed my findings and recommendations with the assigned Medical Resident, Elfego Dale and members of family present at the time of disposition. My findings/plan: The primary encounter diagnosis was Chest pain, unspecified type. A diagnosis of Pancreatic cyst was also pertinent to this visit.   Discharge Medication List as of 8/13/2022 11:17 PM        MD Oskar Stuart MD  08/16/22 [id number]

## 2022-08-15 LAB
EKG ATRIAL RATE: 70 BPM
EKG P AXIS: 49 DEGREES
EKG P-R INTERVAL: 156 MS
EKG Q-T INTERVAL: 398 MS
EKG QRS DURATION: 104 MS
EKG QTC CALCULATION (BAZETT): 429 MS
EKG R AXIS: 9 DEGREES
EKG T AXIS: 60 DEGREES
EKG VENTRICULAR RATE: 70 BPM

## 2022-08-15 ASSESSMENT — ENCOUNTER SYMPTOMS
WHEEZING: 0
BACK PAIN: 0
EYE ITCHING: 0
ABDOMINAL PAIN: 0
TROUBLE SWALLOWING: 0
VOMITING: 0
CHEST TIGHTNESS: 0
EYE REDNESS: 0
SHORTNESS OF BREATH: 0
RHINORRHEA: 0
DIARRHEA: 0
ABDOMINAL DISTENTION: 0
NAUSEA: 0
CONSTIPATION: 0

## 2022-09-30 PROBLEM — U09.9 COVID-19 LONG HAULER MANIFESTING CHRONIC COUGH: Status: ACTIVE | Noted: 2022-09-30

## 2022-09-30 PROBLEM — E87.1 HYPONATREMIA: Status: RESOLVED | Noted: 2021-10-09 | Resolved: 2022-09-30

## 2022-09-30 PROBLEM — R05.3 COVID-19 LONG HAULER MANIFESTING CHRONIC COUGH: Status: ACTIVE | Noted: 2022-09-30

## 2022-09-30 PROBLEM — G47.33 OSA (OBSTRUCTIVE SLEEP APNEA): Status: ACTIVE | Noted: 2022-09-30

## 2022-09-30 PROBLEM — E66.9 OBESITY (BMI 30-39.9): Status: RESOLVED | Noted: 2021-12-18 | Resolved: 2022-09-30

## 2022-09-30 PROBLEM — I74.3 ARTERIAL EMBOLISM AND THROMBOSIS OF LOWER EXTREMITY (HCC): Status: ACTIVE | Noted: 2022-09-30

## 2022-09-30 PROBLEM — D68.9 CLOTTING DISORDER (HCC): Status: RESOLVED | Noted: 2021-12-18 | Resolved: 2022-09-30

## 2022-09-30 PROBLEM — D68.61 ANTIPHOSPHOLIPID SYNDROME (HCC): Status: ACTIVE | Noted: 2022-09-30

## 2022-09-30 PROBLEM — J96.01 ACUTE RESPIRATORY FAILURE WITH HYPOXIA (HCC): Status: RESOLVED | Noted: 2021-10-09 | Resolved: 2022-09-30

## 2022-09-30 PROBLEM — G47.34 NOCTURNAL HYPOXEMIA: Status: ACTIVE | Noted: 2021-12-18

## 2022-10-07 ENCOUNTER — TELEPHONE (OUTPATIENT)
Dept: SLEEP CENTER | Age: 52
End: 2022-10-07

## 2022-10-13 ENCOUNTER — OFFICE VISIT (OUTPATIENT)
Dept: NEUROLOGY | Age: 52
End: 2022-10-13
Payer: COMMERCIAL

## 2022-10-13 VITALS
TEMPERATURE: 97.7 F | WEIGHT: 235 LBS | OXYGEN SATURATION: 94 % | BODY MASS INDEX: 29.22 KG/M2 | SYSTOLIC BLOOD PRESSURE: 149 MMHG | HEART RATE: 67 BPM | DIASTOLIC BLOOD PRESSURE: 82 MMHG | HEIGHT: 75 IN

## 2022-10-13 DIAGNOSIS — G47.33 OSA (OBSTRUCTIVE SLEEP APNEA): ICD-10-CM

## 2022-10-13 DIAGNOSIS — R56.9 NOCTURNAL SEIZURE (HCC): Primary | ICD-10-CM

## 2022-10-13 PROCEDURE — 99214 OFFICE O/P EST MOD 30 MIN: CPT | Performed by: NURSE PRACTITIONER

## 2022-10-13 NOTE — PROGRESS NOTES
[address] [facility]. Meenakshi Moses M.D., F.A.C.P. Mario López, DNP, APRN, ACNS-BC  Thien Ortiz. Alexander Gilliland, MSN, APRN-FNP-C  Dain Story, MSN, APRN-FNP-C  RENE Ghosh, PAYunielC  Won Gavin, MSN, APRN-FNP-C  [facility] [place], [address]  Phone: [phone]  Fax: [phone]         Derek Tripathi is a 45 y.o. right handed male     Patient is a hospital follow-up for seizures    He is accompanied by his wife today    Presented to ED for evaluation of an episode of shaking from sleep. The patient's wife had recorded video of the event prompting his admission. Prior to the beginning of the video she states he had had extension of all limbs followed by flexion of the arms with shaking. The video then demonstrates stertorous breathing with blood from a bit on the interior of the lower lip running down his cheek. He began trying to sit up, but was still disoriented. After this the patient reportedly wandered the house somewhat confusedly for about 15 minutes. The patient had just been discharged home from the hospital the day prior. The initial suspicion had been for sleep apnea, but the patient had some memory loss around this episode as well as the one prompting the current admission. A prior episode of generalized convulsions from sleep also occurred in October 2022, but this was thought to be related to an infection at that time. This admission he is also noted to have an abrasion from the bite on his interior lower lip as well as bilateral humeral fractures proximally. He has no history of seizures. No history of birth complications, developmental delay, learning disabilities, concussion with LOC, stroke, CNS neoplasm or infection. MRI brain completed during his hospital stay showed slightly smaller left parietal area but no acute findings. EEG was normal.  Patient was started on Keppra 1000 mg twice daily and reports no seizures. Patient wife discussed possible DEMETRIUS due to intermittent snoring if patient lies on his back, patient has never been worked up for DEMETRIUS. Today patient notes no seizures. His Keppra was changed to Briviact due to agitation and irritability. He seems to be tolerating the Briviact. He has an appointment with sleep medicine at the end of next month for a sleep study. He followed up with pulmonology and was put on an inhaler and they reordered the sleep study.     No chest pain or palpitations  No coughing or wheezing    No vertigo, lightheadedness or loss of consciousness  No falls, tripping or stumbling  No incontinence of bowels or bladder  No itching or bruising appreciated  No numbness, tingling or focal arm/leg weakness    ROS otherwise negative      Objective:     Vitals:    10/13/22 1435   BP: (!) 149/82   Site: Right Upper Arm   Position: Sitting   Cuff Size: Large Adult   Pulse: 67   Temp: 97.7 °F (36.5 °C)   SpO2: 94%   Weight: 235 lb (106.6 kg)   Height: 6' 3\" (1.905 m)      Afebrile     General appearance: alert, appears stated age, cooperative and no distress  Head: normocephalic, without obvious abnormality, atraumatic  Eyes: conjunctivae/corneas clear  Neck: supple, symmetrical, trachea midline   Lungs: clear to auscultation bilaterally  Heart: regular rate and rhythm  Extremities:  normal, atraumatic, no cyanosis or edema  Skin: color, texture, turgor normal---no rashes or lesions       Mental Status: Alert, oriented, thought content appropriate    Appropriate attention/concentration  Intact fundus of knowledge  Intact memories  Repetition intact    Speech: No dysarthria  Language: No aphasias      Cranial Nerves:  I: smell NA   II: visual acuity  NA   II: visual fields Full to confrontation   II: pupils BRAD   III,VII: ptosis None   III,IV,VI: extraocular muscles  Full ROM   V: mastication Normal   V: facial light touch sensation  Normal   V,VII: corneal reflex     VII: facial muscle function - upper  Normal   VII: facial muscle function - lower Normal   VIII: hearing Normal   IX: soft palate elevation  Normal   IX,X: gag reflex    XI: trapezius strength  5/5   XI: sternocleidomastoid strength 5/5   XI: neck extension strength  5/5   XII: tongue strength  Normal     Motor:  5/5 throughout  Normal bulk and tone  No drift  No abnormal movements    Sensory:  LT normal    Coordination:   FN, FFM and TATYANA normal    Gait:  Normal    DTR:   2+    Laboratory/Radiology:     CBC with Differential:    Lab Results   Component Value Date/Time    WBC 9.1 08/13/2022 01:25 PM    RBC 5.29 08/13/2022 01:25 PM    HGB 12.5 08/13/2022 01:25 PM    HCT 39.2 08/13/2022 01:25 PM     08/13/2022 01:25 PM    MCV 74.1 08/13/2022 01:25 PM    MCH 23.6 08/13/2022 01:25 PM    MCHC 31.9 08/13/2022 01:25 PM    RDW 16.4 08/13/2022 01:25 PM    LYMPHOPCT 12.4 12/21/2021 11:27 AM    MONOPCT 8.7 12/21/2021 11:27 AM    BASOPCT 0.4 12/21/2021 11:27 AM    MONOSABS 0.58 12/21/2021 11:27 AM    LYMPHSABS 0.83 12/21/2021 11:27 AM    EOSABS 0.07 12/21/2021 11:27 AM    BASOSABS 0.03 12/21/2021 11:27 AM     CMP:    Lab Results   Component Value Date/Time     08/13/2022 01:25 PM    K 4.0 08/13/2022 01:25 PM    K 4.2 12/19/2021 04:54 AM     08/13/2022 01:25 PM    CO2 25 08/13/2022 01:25 PM    BUN 15 08/13/2022 01:25 PM    CREATININE 1.2 08/13/2022 01:25 PM    GFRAA >60 08/13/2022 01:25 PM    LABGLOM >60 08/13/2022 01:25 PM    GLUCOSE 97 08/13/2022 01:25 PM    PROT 7.5 08/13/2022 05:28 PM    LABALBU 4.2 08/13/2022 05:28 PM    CALCIUM 9.2 08/13/2022 01:25 PM    BILITOT 0.5 08/13/2022 05:28 PM    ALKPHOS 59 08/13/2022 05:28 PM    AST 19 08/13/2022 05:28 PM    ALT 13 08/13/2022 05:28 PM     Hepatic Function Panel:    Lab Results   Component Value Date/Time    ALKPHOS 59 08/13/2022 05:28 PM    ALT 13 08/13/2022 05:28 PM    AST 19 08/13/2022 05:28 PM    PROT 7.5 08/13/2022 05:28 PM    BILITOT 0.5 08/13/2022 05:28 PM    BILIDIR <0.2 08/13/2022 05:28 PM    IBILI see below 08/13/2022 05:28 PM    LABALBU 4.2 08/13/2022 05:28 PM     MRI brain: Negative for acute findings, abnormal left parietal region which appears smaller than right parietal    EEG: Normal    All labs and imaging studies reviewed independently today    Assessment:     Nocturnal tonic clonic seizures   --- No history of seizures or family history of seizures  --- MRI brain abnormal left parietal region likely focal  --- Neuro exam unremarkable  --- No reported seizures, continues on Briviact     Possible DEMETRIUS  --- Wife reports episodes of intermittent snoring when patient lies on his back  --- Repeat sleep study end of November 2022    Plan:     Continue Briviact 50 mg BID     Follow-up with sleep medicine    Follow-up in 6-month    Call if any seizures occur or with any questions or concerns      TERRIE Abebe CNP  2:25 PM  10/13/2022

## 2022-11-27 ENCOUNTER — HOSPITAL ENCOUNTER (OUTPATIENT)
Dept: SLEEP CENTER | Age: 52
Discharge: HOME | End: 2022-11-27
Payer: COMMERCIAL

## 2022-11-27 DIAGNOSIS — G47.33 OSA (OBSTRUCTIVE SLEEP APNEA): Primary | ICD-10-CM

## 2022-11-27 PROCEDURE — 2700000000 HC OXYGEN THERAPY PER DAY

## 2022-11-27 PROCEDURE — 95810 POLYSOM 6/> YRS 4/> PARAM: CPT

## 2022-12-05 ENCOUNTER — OFFICE VISIT (OUTPATIENT)
Dept: SLEEP CENTER | Age: 52
End: 2022-12-05
Payer: COMMERCIAL

## 2022-12-05 VITALS
SYSTOLIC BLOOD PRESSURE: 131 MMHG | OXYGEN SATURATION: 98 % | DIASTOLIC BLOOD PRESSURE: 89 MMHG | WEIGHT: 236.5 LBS | HEART RATE: 69 BPM | HEIGHT: 75 IN | BODY MASS INDEX: 29.4 KG/M2 | RESPIRATION RATE: 16 BRPM

## 2022-12-05 DIAGNOSIS — G47.34 NOCTURNAL OXYGEN DESATURATION: Primary | ICD-10-CM

## 2022-12-05 PROCEDURE — 99213 OFFICE O/P EST LOW 20 MIN: CPT | Performed by: NURSE PRACTITIONER

## 2022-12-05 ASSESSMENT — SLEEP AND FATIGUE QUESTIONNAIRES
HOW LIKELY ARE YOU TO NOD OFF OR FALL ASLEEP WHILE SITTING INACTIVE IN A PUBLIC PLACE: 1
HOW LIKELY ARE YOU TO NOD OFF OR FALL ASLEEP WHEN YOU ARE A PASSENGER IN A CAR FOR AN HOUR WITHOUT A BREAK: 1
HOW LIKELY ARE YOU TO NOD OFF OR FALL ASLEEP WHILE SITTING QUIETLY AFTER LUNCH WITHOUT ALCOHOL: 1
HOW LIKELY ARE YOU TO NOD OFF OR FALL ASLEEP WHILE SITTING AND READING: 1
HOW LIKELY ARE YOU TO NOD OFF OR FALL ASLEEP WHILE LYING DOWN TO REST IN THE AFTERNOON WHEN CIRCUMSTANCES PERMIT: 0
HOW LIKELY ARE YOU TO NOD OFF OR FALL ASLEEP WHILE SITTING AND TALKING TO SOMEONE: 1
ESS TOTAL SCORE: 7
HOW LIKELY ARE YOU TO NOD OFF OR FALL ASLEEP IN A CAR, WHILE STOPPED FOR A FEW MINUTES IN TRAFFIC: 0
HOW LIKELY ARE YOU TO NOD OFF OR FALL ASLEEP WHILE WATCHING TV: 2

## 2022-12-05 NOTE — Clinical Note
Hi Dr. Brendan Martinez, I spoke with Dr. Shari Lau and patient about the repeat PSG you ordered that was completed on 11/27/22. Study did not show evidence of DEMETRIUS, but significant flow limitation in supine position. I spoke with patient about results. 1 L O2 was applied early on through the night for persistent hypoxia and Dr. Shari Lau is recommending nocturnal O2. The patient would prefer to follow with you for ongoing pulmonary evaluation given his post COVID pneumonia and lupus anticoagulation disorder, so if it is okay, I will defer ordering the oxygen to you, as he will just follow with sleep on an as needed basis. Our office will fax over the sleep study results. If you have any concerns, you can contact me at [phone]. Thank you!  Lucita Kulkarni

## 2022-12-05 NOTE — PROGRESS NOTES
[facility] Sleep Medicine    Patient Name: Jb Jesus  Age: 45 y.o.   : 1970    Date of Visit: 22        Review of Last Visit Summary:    The patient was last seen on 2022 for  Obstructive Sleep Apnea. Interim History:     Jb Jesus is a 45 y.o. male that  has a past medical history of DVT (deep venous thrombosis) ([facility]) and Lupus anticoagulant disorder ([address] He presents as follow up to Sleep Clinic to review sleep study results. Interval Events:    -Patient presents to review sleep study results from a repeat sleep study performed on 2022 ordered by Dr. Brendan Martinez.   -Repeat sleep study was ordered following his previous sleep study in July because he was recovering from a COVID-19 diagnosis from  to see if results improved. Sleep study in  showed a mild degree of DEMETRIUS with a profound degree of hypoxia, so a pulmonary consult was made to further evaluate. Denies smoking history or known underlying pulmonary condition.  -Recent sleep study patient stated they placed 1 L/min of oxygen on him near the beginning of the night. He tries to sleep on his side, wife reports when he sleeps on his back that snoring worsens.  -His watch does sometimes notify him that his oxygen drops through the night. The lowest he has seen is mid to low 80s. -Diagnosed with atrial fibrillation in  following a valve replacement, typically does not have \"episodes \", but admits that last week he felt fluttering in his chest so he took another metoprolol and it resolved. Sleep Study History: 1. 22- PSG @ [facility]- wt 235 lbs, sleep efficiency 68%, REM 28%, AHI 9, SpO2 yoli 85%, T<90% was 93% of TST. 2. 22- PSG @ [facility]- wt 234 lbs, AHI 4, supine AHI 16, T<88% was 42% of TST-- 1L/min O2 applied--- > recommendation of nocturnal oxygen.     Sleep History:  Within the last year, patient has recently experienced several seizures that seem to have been in the early morning(4-5 am), after waking up. He was hospitalized several months ago, had a neurology work-up, and is currently on Keppra daily. He has not had a sleep study in the past, but displays symptoms of sleep apnea. Patient also has a history of a lupus anticoagulant clotting disorder, in which he has been taking warfarin long term. He also has a history of an aortic valve replacement in 2019. No known family history of sleep apnea. Patient states that he does snore loudly and also has witnessed nocturnal apneas from what his wife says. There are times he will wake up with dull a.m. headaches and dry mouth and lately, since starting the Keppra, has found that he has increased brain fog and memory issues. He denies drowsy driving in the past.     Patient estimates that he receives around 7 hours of sleep each night. He will sometimes nap after work in the evening briefly. He sleeps in a bed, with his wife, usually on his sides. If he spends time sleeping on his back, his wife usually will wake him up and tell him that he is snoring more aggressively while sleeping on his back. He does not express difficulty initiating sleep and will wake up a few times throughout the night to use the restroom but can fall back asleep quickly. Does not take medications for sleep. Sometimes, he leaves the TV on to help him fall asleep at night. Patient works day shift, an in office, for [employer]. He drinks 1 to 2 cups of caffeinated coffee through the day and denies tobacco or excessive alcohol use. Patient's weight remained stable this past year. Patient does not necessarily feel he gets poor quality sleep, however, he is willing to perform a sleep study to rule out sleep apnea.         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 N  Tonsillectomy? N  Have you lost or gained weight recently? Stable        PARASOMNIAS/ NARCOLEPSY:  Hypnogogic/Hynopompic Hallucinations: N  Sleep paralysis: Rare  Cataplexy: N  REM behavior symptoms: N  Nightmare: N  Sleep Walking: N  Sleep Talking: N  RLS Symptoms: Rare    Past Medical History:  Past Medical History:   Diagnosis Date    DVT (deep venous thrombosis) (HCC)     Lupus anticoagulant disorder [facility])        Past Surgical History:        Procedure Laterality Date    AORTIC VALVE REPLACEMENT  01/2019    CHOLECYSTECTOMY      HERNIA REPAIR      x 2       Allergies:  has No Known Allergies. Social History:    Social History     Tobacco Use    Smoking status: Never    Smokeless tobacco: Never   Vaping Use    Vaping Use: Never used   Substance Use Topics    Alcohol use: Not Currently    Drug use: Never        Family History:   History reviewed. No pertinent family history.     Current Medications:    Current Outpatient Medications:     beclomethasone (QVAR REDIHALER) 40 MCG/ACT AERB inhaler, Inhale 2 puffs into the lungs in the morning and 2 puffs in the evening., Disp: 3 each, Rfl: 3    aspirin 81 MG EC tablet, Take 81 mg by mouth daily, Disp: , Rfl:     vitamin D3 (CHOLECALCIFEROL) 125 MCG (5000 UT) TABS tablet, Vitamin D3 125 mcg (5,000 unit) tablet  Take 1 tablet every day by oral route., Disp: , Rfl:     metoprolol succinate (TOPROL XL) 50 MG extended release tablet, TAKE ONE TABLET BY MOUTH EVERY DAY, Disp: , Rfl:     warfarin (COUMADIN) 7.5 MG tablet, Take 7.5 mg by mouth Five times weekly on Sunday, Monday, Tuesday, Thursday and Friday, Disp: , Rfl:     dilTIAZem (CARDIZEM CD) 120 MG extended release capsule, Take 120 mg by mouth daily, Disp: , Rfl:     fluticasone (FLONASE) 50 MCG/ACT nasal spray, 2 sprays in each nostril bid, Disp: 1 Bottle, Rfl: 5    Brivaracetam (BRIVIACT) 50 MG tablet, Take 1 tablet by mouth 2 times daily for 30 days. , Disp: 60 tablet, Rfl: 11    Sleep Medicine 12/5/2022 8/11/2022 7/31/2022 4/27/2022 9/11/2019   Sitting and reading 1 2 1 2 1   Watching TV 2 3 2 2 1   Sitting, inactive in a public place (e.g. a theatre or a meeting) 1 2 1 1 0   As a passenger in a car for an hour without a break 1 2 2 3 0   Lying down to rest in the afternoon when circumstances permit 0 3 2 2 1   Sitting and talking to someone 1 1 1 0 0   Sitting quietly after a lunch without alcohol 1 2 1 1 0   In a car, while stopped for a few minutes in traffic 0 1 1 1 0   [place] Sleepiness Score 7 16 11 12 3   Neck circumference (Inches) - - 17 17 16       Review of Systems:    Constitutional: no chills, no fever   Eyes: no blurred vision  Cardiovascular: no chest pain,   Respiratory: no cough, no shortness of breath   Gastrointestinal:  no nausea,  no vomiting, no diarrhea. Musculoskeletal: no arthralgias  Neurological:no dizziness,  no headache, no memory changes. Endocrine: No chills        Objective:   PHYSICAL EXAM:    /89 (Site: Left Upper Arm, Position: Sitting, Cuff Size: Large Adult)   Pulse 69   Resp 16   Ht 6' 3\" (1.905 m)   Wt 236 lb 8 oz (107.3 kg)   SpO2 98%   BMI 29.56 kg/m²     Physical exam:  Gen: No acute distress. BMI of Body mass index is 29.56 kg/m². Neck: Trachea midline. No obvious mass.  Neck circumference     Resp: No accessory muscle use. No crackles. No wheezes. No rhonchi. CV: Regular rate. Regular rhythm. No murmur or rub. Skin: Warm and dry. M/S: No cyanosis. No obvious joint deformity. Neuro: Awake. Alert. Moves all four extremities. Psych: Alert and oriented. No anxiety. Assessment:      Felice was seen today for sleep apnea. Diagnoses and all orders for this visit:    Nocturnal oxygen desaturation     Plan:       1. Nocturnal Oxygen Desaturation    -Noted on repeat sleep study 11/27/22 -- 1L/min of O2 applied during the night.  -Although sleep study not consistent with DEMETRIUS, flow limitation was apparent in the supine position. Patient was informed to avoid supine sleep.   -Nocturnal oxygen recommended. After discussion with patient, he would like to continue to follow with pulmonary at this time to continue pulmonary workup. Will defer O2 order to pulmonary and contact office.   -Aware that if weight gain occurs, or symptoms of snoring, daytime fatigue, AM headaches, or nocturnal awakenings occur, please contact office for repeat sleep study. Return if symptoms worsen or fail to improve.     Calin Briones, APRN-CNP  [address] [place] [address] -[phone] option 2  - [phone]

## 2022-12-07 ENCOUNTER — TELEPHONE (OUTPATIENT)
Dept: NEUROLOGY | Age: 52
End: 2022-12-07

## 2022-12-07 DIAGNOSIS — R56.9 NOCTURNAL SEIZURE (HCC): ICD-10-CM

## 2022-12-07 PROBLEM — G47.19 EXCESSIVE DAYTIME SLEEPINESS: Status: ACTIVE | Noted: 2022-12-07

## 2022-12-07 PROBLEM — F45.8 BRUXISM: Status: ACTIVE | Noted: 2022-12-07

## 2022-12-07 PROBLEM — G47.34 NOCTURNAL OXYGEN DESATURATION: Status: ACTIVE | Noted: 2022-12-07

## 2022-12-07 NOTE — PROGRESS NOTES
[address]    SLEEP STUDY REPORT     PATIENT NAME: Natalie Melton                        : 1970  MED REC NO:   [id number]                          ACCOUNT NO: [de-identified]                              PROVIDER:     Caesar Chan MD     DATE OF STUDY: 2022     FULL NIGHT POLYSOMNOGRAM REPORT     LOCATION:  [address] [place] [address]     REFERRING PROVIDER: Dr. Jacques Day    AGE: 45 yrs      SEX: Male          HEIGHT: 6 ft 3 in         WEIGHT: 234 lbs          BMI: 29.2 kg/m2    NECK CIRCUMFERENCE: 16.5 in    Symptoms: Snoring, daytime sleepiness, difficulty at work due to sleepiness, nocturnal choking/gasping, nocturia, waking confused, restless legs, leg jerks, sleep paralysis, bruxism, difficulty returning to sleep once awakened. The [place] Sleepiness Scale was 12 out of 24 (scores above or equal to 10 are suggestive of hypersomnolence). Indication: To reevaluate severity of obstructive sleep apnea. Medical History: Overweight, lupus anticoagulant/antiphospholipid antibody syndrome, paroxysmal atrial fibrillation, history of DVT status post thrombectomy, seizure disorder, hypertension, long COVID syndrome (COVID-pneumonia in 2021 in 2022), and mild DEMETRIUS with hypoxia out of proportion to respiratory events diagnosed 2022 (AHI 9, T90 93% of TST). Medications: Beclomethasone, Keppra, vitamin D3, diltiazem, fluticasone, metoprolol succinate, warfarin. DESCRIPTION: This full night polysomnogram consisted of EEG, EOG, EMG and 2-lead ECG monitoring. Oronasal airflow (nasal pressure transducer and thermistor), chest and abdominal efforts by respiratory inductance plethysmography or polyvinylidene fluoride (PVDF) sensor, and pulse oximetry were monitored as well.  Hypopneas were scored as at least a 30% reduction in amplitude of the semiquantitative flow signal associated with a 3% or greater oxygen desaturation and/or an EEG microarousal.  Respiratory effort related arousals were scored as a sequence of breaths lasting at least 10 seconds characterized by increased respiratory effort or decreased inspiratory airflow associated with an EEG microarousal, not otherwise meeting criteria for an apnea or hypopnea. FINDINGS:  SLEEP CONTINUITY AND SLEEP ARCHITECTURE:  Lights were turned off at 9:18 PM and lights were turned on at 5:17 AM. Total recording time was 479 minutes and total sleep time was 344 minutes. The overall sleep efficiency was mildly reduced at 72%. Sleep onset latency was normal at 32 minutes and REM latency was increased at 205 minutes. There were 27 awakenings during this study. The duration of wakefulness after sleep onset was 104 minutes. The amount of N1 sleep was 23% of total sleep time, or 78 minutes. The amount of N2 sleep was 70% of total sleep time, or 241 minutes. The amount of REM sleep was reduced at 7% of total sleep time, or 25 minutes. Slow wave sleep was virtually absent. The microarousal index was increased at 18; arousals were mostly spontaneous in nature. RESPIRATORY MONITORING:  This study documented 0 obstructive apneas, 0 central apneas, 0 mixed apneas, 23 hypopneas, and 0 respiratory effort related arousals (RERAs) during the total recorded sleep time. The overall apnea/hypopnea index (AHI) was 4. The overall respiratory disturbance index (RDI includes RERAs) was 4. The non-REM RDI was 4.3 and the REM RDI was 0. The patient slept supine for just 14% of the total sleep time, and the supine RDI was 16. The patient slept in the left and right lateral positions for the remainder of the night, with respective RDI's of 0 and 2.4. The patient demonstrated sustained desaturations less than 88% for 5 minutes at the start of the study, so within one hour was placed on supplemental oxygen at 1 L/min. This was maintained for the remainder of the night.    The average oxyhemoglobin saturation was 90% while awake, 91% during non-REM sleep and 91% during REM sleep. The overall 3% oxygen desaturation index during sleep was 0. The lowest oxygen saturation during sleep was 84%. Oxygen saturations were less than or equal to 89% for 144 minutes or 42% of the total sleep time. Mild snoring was noted. EEG: No abnormal epileptiform activity was observed. ECG: The electrocardiogram documented normal sinus rhythm. The average heart rate during sleep was 65 beats per minute. PERIODIC LIMB MOVEMENTS: Rare periodic limb movements were noted. EMG/VIDEO MONITORING: Loss of REM atonia and parasomnias were not observed. Intermittent bruxism was seen. IMPRESSION:   1. This study does not show evidence of significant sleep disordered breathing. Flow limitation was most apparent when the patient was in the supine position. Severity may have been underestimated due to the paucity of supine sleep in the study. 2.  Significant hypoxia was present from the beginning of the night, independent of any sleep disordered breathing. Supplemental oxygen at 1 L/min was added within one hour of study initiation, and maintained for the remainder of the night. 3.  Intermittent bruxism was seen. RECOMMENDATIONS:    1. Oxygen therapy during sleep is recommended. In addition, the patient may be advised to maintain nonsupine sleep so as to mitigate any potential sleep disordered breathing. 2.  Referral to sleep dentistry for a ightguard may be considered, given the bruxism noted on the study. 3.  The patient should be strongly counseled against driving while drowsy. 4.  The patient should be aware that weight gain and aging may increase the risk for obstructive sleep apnea.       Giancarlo Dickerson MD

## 2022-12-07 NOTE — TELEPHONE ENCOUNTER
Please resend Briviact Rx to [facility].   Electronically signed by Wendi Collier MA on 12/7/2022 at 1:47 PM

## 2022-12-22 ENCOUNTER — TELEPHONE (OUTPATIENT)
Dept: HEMATOLOGY | Age: 52
End: 2022-12-22

## 2022-12-22 NOTE — TELEPHONE ENCOUNTER
Spoke to [facility] about his scheduled MRI appt. Pt confirmed appt location, date, and time. [facility] expressed understanding of instructions and scheduled a follow up appt with Dr Kailee Chapa. Encouraged pt to call with any questions or concerns.

## 2022-12-27 ENCOUNTER — HOSPITAL ENCOUNTER (OUTPATIENT)
Dept: MRI IMAGING | Age: 52
Discharge: HOME | End: 2022-12-29
Payer: COMMERCIAL

## 2022-12-27 DIAGNOSIS — D49.0 IPMN (INTRADUCTAL PAPILLARY MUCINOUS NEOPLASM): ICD-10-CM

## 2022-12-27 PROCEDURE — A9579 GAD-BASE MR CONTRAST NOS,1ML: HCPCS | Performed by: RADIOLOGY

## 2022-12-27 PROCEDURE — 6360000004 HC RX CONTRAST MEDICATION: Performed by: RADIOLOGY

## 2022-12-27 PROCEDURE — 74183 MRI ABD W/O CNTR FLWD CNTR: CPT

## 2022-12-27 RX ADMIN — GADOTERIDOL 20 ML: 279.3 INJECTION, SOLUTION INTRAVENOUS at 09:02

## 2023-01-12 ENCOUNTER — OFFICE VISIT (OUTPATIENT)
Dept: HEMATOLOGY | Age: 53
End: 2023-01-12
Payer: COMMERCIAL

## 2023-01-12 VITALS
WEIGHT: 234 LBS | HEART RATE: 60 BPM | TEMPERATURE: 97.3 F | OXYGEN SATURATION: 96 % | SYSTOLIC BLOOD PRESSURE: 135 MMHG | DIASTOLIC BLOOD PRESSURE: 86 MMHG | RESPIRATION RATE: 16 BRPM | BODY MASS INDEX: 29.09 KG/M2 | HEIGHT: 75 IN

## 2023-01-12 DIAGNOSIS — D49.0 IPMN (INTRADUCTAL PAPILLARY MUCINOUS NEOPLASM): ICD-10-CM

## 2023-01-12 DIAGNOSIS — K86.2 PANCREATIC CYST: Primary | ICD-10-CM

## 2023-01-12 PROCEDURE — 99213 OFFICE O/P EST LOW 20 MIN: CPT | Performed by: TRANSPLANT SURGERY

## 2023-01-12 PROCEDURE — 99212 OFFICE O/P EST SF 10 MIN: CPT | Performed by: TRANSPLANT SURGERY

## 2023-01-12 NOTE — PROGRESS NOTES
Hepatobiliary and Pancreatic Surgery Attending History and Physical    Patient's Name/Date of Birth: Cecy Pedro /1970 (3 y.o.)    Date: January 12, 2023     CC:1.2cm pancreatic cyst    HPI:  Six 52-year-old male presenting with seizure-like activity. Patient has a past medical history of thrombocytopenia with lupus anticoagulation, history of DVT, history of valve replacement on Xarelto. He had a triphasic CT scan which was very grainy due to him not being able to lift his arms above his head. He does have a pancreatic tail cyst and possibly new liver lesions. He is on coumadin due to have lupus anticoagulant disorder. He is also up to date on his colonoscopy's. Tumor markers - CEA, Ca 19-9 and chromogranin A all normal The pancreatic cyst was found in 2018. His PetCT was normal.  He underwent surveillance imaging. Physical Exam:  /86   Pulse 60   Temp 97.3 °F (36.3 °C) (Temporal)   Resp 16   Ht 6' 3\" (1.905 m)   Wt 234 lb (106.1 kg)   SpO2 96%   BMI 29.25 kg/m²     PSYCH: mood and affect normal, alert and oriented x 3: No apparent distress, comfortable  EYES: Sclera white, pupils equal round and reactive to light  ENMT:  Hearing normal, trachea midline, ears externally intact  LYMPH: no obvious lympadenopathy in neck. RESP: Respiratory effort was normal with no retractions or use of accessory muscles. CV:  No pedal edema  GI/ Abdomen: Soft, nondistended, nontender, no guarding, no peritoneal signs  MSK: no clubbing/ no cyanosis/ gaitnormal       Assessment/Plan:  1.2cm pancreatic body cyst with lupus anticoagulant and aortic valve repair on coumadin  - I reviewed their images prior to our office visit and we also reviewed them together today. - will need surveillance imaging of his pancreas yearly. - will see him after his MRI next year  - We discussed that he does not have any worrisome features which is reassuring.   We discussed what worrisome features are.  - he is currently in year 4/5 for his pancreas and if no change will go to q2 years    20 Minutes of which greater than 50% was spent counseling or coordinating his care. Thank you Dr. Danielle Cole for the consultation allowing me to take part in Mr. Ge's care.      Please send a copy of my note to Dr. Harjit Talbot    Electronically signed by Darion Short MD on 1/12/2023 at 4:17 PM

## 2023-03-10 ENCOUNTER — APPOINTMENT (OUTPATIENT)
Dept: CT IMAGING | Age: 53
End: 2023-03-10
Payer: COMMERCIAL

## 2023-03-10 ENCOUNTER — APPOINTMENT (OUTPATIENT)
Dept: ULTRASOUND IMAGING | Age: 53
End: 2023-03-10
Payer: COMMERCIAL

## 2023-03-10 ENCOUNTER — HOSPITAL ENCOUNTER (EMERGENCY)
Age: 53
Discharge: HOME | End: 2023-03-10
Attending: STUDENT IN AN ORGANIZED HEALTH CARE EDUCATION/TRAINING PROGRAM
Payer: COMMERCIAL

## 2023-03-10 VITALS
HEART RATE: 85 BPM | TEMPERATURE: 98 F | OXYGEN SATURATION: 100 % | SYSTOLIC BLOOD PRESSURE: 122 MMHG | DIASTOLIC BLOOD PRESSURE: 78 MMHG | RESPIRATION RATE: 17 BRPM

## 2023-03-10 DIAGNOSIS — I82.412 ACUTE DEEP VEIN THROMBOSIS (DVT) OF FEMORAL VEIN OF LEFT LOWER EXTREMITY (HCC): Primary | ICD-10-CM

## 2023-03-10 PROBLEM — J12.82 PNEUMONIA DUE TO COVID-19 VIRUS: Status: ACTIVE | Noted: 2023-03-10

## 2023-03-10 PROBLEM — U07.1 PNEUMONIA DUE TO COVID-19 VIRUS: Status: ACTIVE | Noted: 2023-03-10

## 2023-03-10 LAB
INR BLD: 3.2
PROTHROMBIN TIME: 35 SEC (ref 9.3–12.4)

## 2023-03-10 PROCEDURE — 6360000002 HC RX W HCPCS: Performed by: STUDENT IN AN ORGANIZED HEALTH CARE EDUCATION/TRAINING PROGRAM

## 2023-03-10 PROCEDURE — 99284 EMERGENCY DEPT VISIT MOD MDM: CPT

## 2023-03-10 PROCEDURE — 96372 THER/PROPH/DIAG INJ SC/IM: CPT

## 2023-03-10 PROCEDURE — 6370000000 HC RX 637 (ALT 250 FOR IP): Performed by: STUDENT IN AN ORGANIZED HEALTH CARE EDUCATION/TRAINING PROGRAM

## 2023-03-10 PROCEDURE — 36415 COLL VENOUS BLD VENIPUNCTURE: CPT

## 2023-03-10 PROCEDURE — 85610 PROTHROMBIN TIME: CPT

## 2023-03-10 PROCEDURE — 93971 EXTREMITY STUDY: CPT

## 2023-03-10 PROCEDURE — 72131 CT LUMBAR SPINE W/O DYE: CPT

## 2023-03-10 RX ORDER — OXYCODONE HYDROCHLORIDE AND ACETAMINOPHEN 5; 325 MG/1; MG/1
1 TABLET ORAL EVERY 6 HOURS PRN
Qty: 12 TABLET | Refills: 0 | Status: SHIPPED | OUTPATIENT
Start: 2023-03-10 | End: 2023-03-13

## 2023-03-10 RX ORDER — OXYCODONE HYDROCHLORIDE 5 MG/1
5 TABLET ORAL ONCE
Status: COMPLETED | OUTPATIENT
Start: 2023-03-10 | End: 2023-03-10

## 2023-03-10 RX ORDER — ORPHENADRINE CITRATE 30 MG/ML
60 INJECTION INTRAMUSCULAR; INTRAVENOUS ONCE
Status: COMPLETED | OUTPATIENT
Start: 2023-03-10 | End: 2023-03-10

## 2023-03-10 RX ADMIN — OXYCODONE 5 MG: 5 TABLET ORAL at 07:40

## 2023-03-10 RX ADMIN — OXYCODONE 5 MG: 5 TABLET ORAL at 11:49

## 2023-03-10 RX ADMIN — ORPHENADRINE CITRATE 60 MG: 30 INJECTION INTRAMUSCULAR; INTRAVENOUS at 07:40

## 2023-03-10 ASSESSMENT — ENCOUNTER SYMPTOMS
BACK PAIN: 1
SHORTNESS OF BREATH: 0
COUGH: 0
ABDOMINAL PAIN: 0
NAUSEA: 0
EYE PAIN: 0
VOMITING: 0
EYE DISCHARGE: 0
SINUS PRESSURE: 0
EYE REDNESS: 0
DIARRHEA: 0
SORE THROAT: 0
WHEEZING: 0

## 2023-03-10 ASSESSMENT — PAIN DESCRIPTION - DESCRIPTORS
DESCRIPTORS: ACHING;DISCOMFORT;THROBBING
DESCRIPTORS: ACHING;SPASM;DISCOMFORT

## 2023-03-10 ASSESSMENT — PAIN SCALES - GENERAL
PAINLEVEL_OUTOF10: 8
PAINLEVEL_OUTOF10: 6

## 2023-03-10 ASSESSMENT — PAIN DESCRIPTION - LOCATION
LOCATION: LEG
LOCATION: LEG;BACK

## 2023-03-10 ASSESSMENT — PAIN DESCRIPTION - ORIENTATION
ORIENTATION: LEFT
ORIENTATION: LEFT

## 2023-03-10 ASSESSMENT — LIFESTYLE VARIABLES: HOW OFTEN DO YOU HAVE A DRINK CONTAINING ALCOHOL: NEVER

## 2023-03-10 NOTE — ED PROVIDER NOTES
HPI   This is a 47-year-old male here for evaluation of left lower back pain, thigh pain. Patient reporting that he has been having back pain for about a week and since yesterday evening he is having upper thigh pain. No numbness or weakness. No saddle paresthesias or anesthesias. He denies any bowel or bladder incontinence. No fevers or chills. No chest pain or shortness of breath. He does have significant past medical history for antiphospholipid syndrome, lupus anticoagulant disorder and history of DVT in the left lower extremity. He is on Coumadin and states his goal INR is between 2.5 and 3.5. He had it checked a few weeks ago and he says it was 3.7. He denies any known specific injuries. Patient states what mainly brought him in to the emergency department today is the left thigh pain which started last night and he is concerned he may have another blood clot. Review of Systems   Constitutional:  Negative for chills and fever. HENT:  Negative for ear pain, sinus pressure and sore throat. Eyes:  Negative for pain, discharge and redness. Respiratory:  Negative for cough, shortness of breath and wheezing. Cardiovascular:  Negative for chest pain. Gastrointestinal:  Negative for abdominal pain, diarrhea, nausea and vomiting. Genitourinary:  Negative for dysuria and frequency. Musculoskeletal:  Positive for back pain. Negative for arthralgias. Left thigh pain   Skin:  Negative for rash and wound. Neurological:  Negative for weakness and headaches. Hematological:  Negative for adenopathy. All other systems reviewed and are negative. Physical Exam  Vitals and nursing note reviewed. Constitutional:       Appearance: He is well-developed. HENT:      Head: Normocephalic and atraumatic. Eyes:      Conjunctiva/sclera: Conjunctivae normal.   Cardiovascular:      Rate and Rhythm: Normal rate and regular rhythm.       Pulses:           Dorsalis pedis pulses are 2+ on the left side. Posterior tibial pulses are 2+ on the left side. Heart sounds: Normal heart sounds. No murmur heard. Pulmonary:      Effort: Pulmonary effort is normal. No respiratory distress. Breath sounds: Normal breath sounds. No wheezing or rales. Abdominal:      General: Bowel sounds are normal.      Palpations: Abdomen is soft. Tenderness: There is no abdominal tenderness. There is no guarding or rebound. Musculoskeletal:         General: No tenderness or deformity. Cervical back: Normal range of motion and neck supple. Comments: No lower extremity edema. Full range of motion to the bilateral hips and knees with flexion and extension. There is tenderness to palpation at the left SI area. No overlying redness or swelling noted. Reproduction of pain with left-sided straight leg raising. Skin:     General: Skin is warm and dry. Neurological:      Mental Status: He is alert and oriented to person, place, and time. Cranial Nerves: No cranial nerve deficit. Coordination: Coordination normal.        Procedures     MDM  Differential diagnosis includes radiculopathy, DVT, strain, cauda equina less likely    Patient here for evaluation of left lower extremity pain. Ultrasound imaging reveals nonocclusive left superficial femoral vein DVT. He has good distal pulses, no appreciable swelling or skin discoloration or changes. I did obtain lumbar CT scan imaging as he was complaining of back pain and he was scheduled for imaging as an outpatient today showing lumbar degenerative changes no acute osseous findings. I believe his back pain at this time is secondary to musculoskeletal problem as the pain is all reproducible, I do not believe at this time he has a higher clot possibly in the iliofemoral region as he is having no abdominal pain and once again his back pain is all reproducible and positive pain with straight leg raise.   His INR is in the therapeutic range at 3.2.  I called vascular surgery and discussed the case, he believes patient can be safely discharged on his Coumadin. Patient was given oxycodone here for pain as NSAIDs are contraindicated due to his anticoagulation. At this time he was given referral to vascular surgery for follow-up. He is having no chest pain or shortness of breath and no abdominal pain. His back pain did improve. No tachycardia, no tachypnea or hypoxia, no further work-up was performed to evaluate for PE. I did have shared decision-making with him and I did discuss with him that if he does developed any skin discoloration changes, leg swelling, chest pain, shortness of breath he needs to return back immediately he is understanding agreeable with plan. ED Course as of 03/10/23 1157   Fri Mar 10, 2023   1116 Spoke with Dr. Leopoldo Mayer for vascular surgery patient is okay to be discharged and continue on his Coumadin. He believes this may be a false positive on the ultrasound due to it being at an a noncompressible site [FG]      ED Course User Index  [FG] Yessi Ferro DO     Medications   orphenadrine (NORFLEX) injection 60 mg (60 mg IntraMUSCular Given 3/10/23 0740)   oxyCODONE (ROXICODONE) immediate release tablet 5 mg (5 mg Oral Given 3/10/23 0740)   oxyCODONE (ROXICODONE) immediate release tablet 5 mg (5 mg Oral Given 3/10/23 1149)       ED Course as of 03/10/23 1157   Fri Mar 10, 2023   1116 Spoke with Dr. Leopoldo Mayer for vascular surgery patient is okay to be discharged and continue on his Coumadin. He believes this may be a false positive on the ultrasound due to it being at an a noncompressible site [FG]      ED Course User Index  [FG] Yessi Ferro,        --------------------------------------------- PAST HISTORY ---------------------------------------------  Past Medical History:  has a past medical history of DVT (deep venous thrombosis) (HCC) and Lupus anticoagulant disorder ([facility] [address]     Past Surgical History:  has a past surgical history that includes hernia repair; Cholecystectomy; and Aortic valve replacement (01/2019). Social History:  reports that he has never smoked. He has never used smokeless tobacco. He reports that he does not currently use alcohol. He reports that he does not use drugs. Family History: family history is not on file. The patients home medications have been reviewed. Allergies: Patient has no known allergies. -------------------------------------------------- RESULTS -------------------------------------------------  Labs:  Results for orders placed or performed during the hospital encounter of 03/10/23   Protime-INR   Result Value Ref Range    Protime 35.0 (H) 9.3 - 12.4 sec    INR 3.2        Radiology:  CT LUMBAR SPINE WO CONTRAST   Final Result   No acute osseous findings. Lumbar degenerative disease as described. US DUP LOWER EXTREMITY LEFT EVELIO   Final Result   Nonocclusive left superficial femoral vein DVT.             ------------------------- NURSING NOTES AND VITALS REVIEWED ---------------------------  Date / Time Roomed:  3/10/2023  7:14 AM  ED Bed Assignment:  04/04    The nursing notes within the ED encounter and vital signs as below have been reviewed. /85   Pulse 99   Temp 98 °F (36.7 °C)   Resp 16   SpO2 97%   Oxygen Saturation Interpretation: Normal  --------------------------------- ADDITIONAL PROVIDER NOTES ---------------------------------  At this time the patient is without objective evidence of an acute process requiring hospitalization or inpatient management. They have remained hemodynamically stable throughout their entire ED visit and are stable for discharge with outpatient follow-up. The plan has been discussed in detail and they are aware of the specific conditions for emergent return, as well as the importance of follow-up.       New Prescriptions    OXYCODONE-ACETAMINOPHEN (PERCOCET) 5-325 MG PER TABLET    Take 1 tablet by mouth every 6 hours as needed for Pain for up to 3 days. Intended supply: 3 days. Take lowest dose possible to manage pain Max Daily Amount: 4 tablets       Diagnosis:  1. Acute deep vein thrombosis (DVT) of femoral vein of left lower extremity (HCC)        Disposition:  Patient's disposition: Discharge to home  Patient's condition is stable.        Yessi Ferro DO  Resident  03/10/23 8008

## 2023-03-10 NOTE — DISCHARGE INSTRUCTIONS
You develop any chest pain, shortness of breath please return back to emergency department. If you develop any leg swelling, the skin coloration changes please return back as well. Otherwise follow-up with the vascular surgeon.

## 2023-03-27 ENCOUNTER — TELEPHONE (OUTPATIENT)
Dept: HEMATOLOGY | Age: 53
End: 2023-03-27

## 2023-03-27 NOTE — TELEPHONE ENCOUNTER
called patient and left them a voicemail along with our office number for patient to call and schedule a new patient appt with dr Cristofer Pendleton, referral from Dr Anne Route  Electronically signed by Juan Carlos Qiu MA on 3/27/2023 at 1:54 PM

## 2023-03-29 ENCOUNTER — TELEPHONE (OUTPATIENT)
Dept: HEMATOLOGY | Age: 53
End: 2023-03-29

## 2023-03-29 NOTE — TELEPHONE ENCOUNTER
LVM with patient to let him know that Dr Aubrey Peoples said ok for him to be put on his schedule for colonoscopy.  Asked patient for return call so we can see what date he wanted us to schedule him  Electronically signed by Mari Turcios MA on 3/29/2023 at 9:07 AM

## 2023-04-04 ENCOUNTER — PREP FOR PROCEDURE (OUTPATIENT)
Dept: HEMATOLOGY | Age: 53
End: 2023-04-04

## 2023-04-04 ENCOUNTER — TELEPHONE (OUTPATIENT)
Dept: HEMATOLOGY | Age: 53
End: 2023-04-04

## 2023-04-04 PROBLEM — Z12.11 SPECIAL SCREENING FOR MALIGNANT NEOPLASMS, COLON: Status: ACTIVE | Noted: 2023-04-04

## 2023-04-04 NOTE — TELEPHONE ENCOUNTER
I called the patients insurance [address] [place] [address] and spoke with Lyudmila Michael who stated that cpt code 77904 does not require a prior auth. The patient is scheduled for his colonoscopy on 04/25/23 [facility] at 2:00pm. I called and spoke with the patient and gave him the above time, date, location and instructions for his procedure. I also emailed him a copy of the colonoscopy prep to [email or web address], and told the patient if he does have any questions after reviewing it to call our office. The patient did state that he does take coumadin. I made him aware that he should start holding this on 04/18/2023. He did state that he was going to reach out to Dr Anthony Bella and ask him if he would need to bridge his coumadin prior to his procedure.   At this time all questions were answered and the patient verbalized understanding  Electronically signed by Faraz Malin MA on 4/4/2023 at 10:39 AM'

## 2023-04-24 ENCOUNTER — ANESTHESIA EVENT (OUTPATIENT)
Dept: ENDOSCOPY | Age: 53
End: 2023-04-24
Payer: COMMERCIAL

## 2023-04-24 RX ORDER — ENOXAPARIN SODIUM 100 MG/ML
INJECTION SUBCUTANEOUS 2 TIMES DAILY
COMMUNITY

## 2023-04-24 NOTE — PROGRESS NOTES
[facility] PRE-ADMISSION TESTING INSTRUCTIONS    The Preadmission Testing patient is instructed accordingly using the following criteria (check applicable):    ARRIVAL INSTRUCTIONS:  [x] Parking the day of Surgery is located in the Main Entrance lot. Upon entering the door, make an immediate right to the surgery reception desk    [x] Bring photo ID and insurance card    [] Bring in a copy of Living will or Durable Power of  papers. [x] Please be sure to arrange for responsible adult to provide transportation to and from the hospital    [x] Please arrange for responsible adult to be with you for the 24 hour period post procedure due to having anesthesia    [x] If you awake am of surgery not feeling well or have temperature >100 please call [phone]    GENERAL INSTRUCTIONS:    [x] Nothing by mouth after midnight, including gum, candy, mints or water    [x] You may brush your teeth, but do not swallow any water    [x] Take medications as instructed with 1-2 oz of water    [x] Stop herbal supplements and vitamins 5 days prior to procedure    [x] Follow preop dosing of blood thinners per physician instructions    [] Take 1/2 dose of evening insulin, but no insulin after midnight    [] No oral diabetic medications after midnight    [] If diabetic and have low blood sugar or feel symptomatic, take 1-2oz apple juice only    [] Bring inhalers day of surgery    [] Bring C-PAP/ Bi-Pap day of surgery    [] Bring urine specimen day of surgery    [x] Shower or bath with soap, lather and rinse well, AM of Surgery, no lotion, powders or creams to surgical site    [] Follow bowel prep as instructed per surgeon    [x] No tobacco products within 24 hours of surgery     [x] No alcohol or illegal drug use within 24 hours of surgery.     [x] Jewelry, body piercing's, eyeglasses, contact lenses and dentures are not permitted into surgery (bring cases)      [] Please do not wear any nail polish,

## 2023-04-24 NOTE — ANESTHESIA PRE PROCEDURE
with ECG of 18-DEC-2021 12:45,  No significant change was found     Neuro/Psych:   (+) seizures (Last seizure 2021): no interval change,              ROS comment: Partial idiopathic epilepsy with seizures of localized onset  Excessive daytime somnolence  Bruxism GI/Hepatic/Renal:            ROS comment: 1.2cm pancreatic body cyst.   Endo/Other:    (+) blood dyscrasia (Coumadin): anticoagulation therapy:., .          Pt had no PAT visit        ROS comment: Antiphospholipid syndrome  Chronically anticoagulated with Coumadin LD last Tuesday bridged with Lovenox BID dosing LD this am. Abdominal:         (-) obese       Vascular:   + DVT, . Other Findings:           Anesthesia Plan      MAC     ASA 3       Induction: intravenous. Anesthetic plan and risks discussed with patient. Plan discussed with CRNA.                     Rg Singh DO   4/24/2023

## 2023-04-25 ENCOUNTER — HOSPITAL ENCOUNTER (OUTPATIENT)
Age: 53
Setting detail: OUTPATIENT SURGERY
Discharge: HOME | End: 2023-04-25
Attending: TRANSPLANT SURGERY | Admitting: TRANSPLANT SURGERY
Payer: COMMERCIAL

## 2023-04-25 ENCOUNTER — ANESTHESIA (OUTPATIENT)
Dept: ENDOSCOPY | Age: 53
End: 2023-04-25
Payer: COMMERCIAL

## 2023-04-25 VITALS
TEMPERATURE: 97.7 F | SYSTOLIC BLOOD PRESSURE: 110 MMHG | HEART RATE: 69 BPM | DIASTOLIC BLOOD PRESSURE: 72 MMHG | HEIGHT: 75 IN | BODY MASS INDEX: 29.22 KG/M2 | OXYGEN SATURATION: 93 % | WEIGHT: 235 LBS | RESPIRATION RATE: 18 BRPM

## 2023-04-25 DIAGNOSIS — Z12.11 SPECIAL SCREENING FOR MALIGNANT NEOPLASMS, COLON: ICD-10-CM

## 2023-04-25 DIAGNOSIS — D36.9 ADENOMA: Primary | ICD-10-CM

## 2023-04-25 PROCEDURE — 2720000010 HC SURG SUPPLY STERILE: Performed by: TRANSPLANT SURGERY

## 2023-04-25 PROCEDURE — 3700000000 HC ANESTHESIA ATTENDED CARE: Performed by: TRANSPLANT SURGERY

## 2023-04-25 PROCEDURE — 3609010400 HC COLONOSCOPY POLYPECTOMY HOT BIOPSY: Performed by: TRANSPLANT SURGERY

## 2023-04-25 PROCEDURE — 3700000001 HC ADD 15 MINUTES (ANESTHESIA): Performed by: TRANSPLANT SURGERY

## 2023-04-25 PROCEDURE — 6360000002 HC RX W HCPCS: Performed by: NURSE ANESTHETIST, CERTIFIED REGISTERED

## 2023-04-25 PROCEDURE — 2709999900 HC NON-CHARGEABLE SUPPLY: Performed by: TRANSPLANT SURGERY

## 2023-04-25 PROCEDURE — 88305 TISSUE EXAM BY PATHOLOGIST: CPT

## 2023-04-25 PROCEDURE — 7100000010 HC PHASE II RECOVERY - FIRST 15 MIN: Performed by: TRANSPLANT SURGERY

## 2023-04-25 PROCEDURE — 7100000011 HC PHASE II RECOVERY - ADDTL 15 MIN: Performed by: TRANSPLANT SURGERY

## 2023-04-25 PROCEDURE — 2580000003 HC RX 258: Performed by: NURSE ANESTHETIST, CERTIFIED REGISTERED

## 2023-04-25 PROCEDURE — 45385 COLONOSCOPY W/LESION REMOVAL: CPT | Performed by: TRANSPLANT SURGERY

## 2023-04-25 RX ORDER — SODIUM CHLORIDE 0.9 % (FLUSH) 0.9 %
5-40 SYRINGE (ML) INJECTION PRN
Status: DISCONTINUED | OUTPATIENT
Start: 2023-04-25 | End: 2023-04-25 | Stop reason: HOSPADM

## 2023-04-25 RX ORDER — PROPOFOL 10 MG/ML
INJECTION, EMULSION INTRAVENOUS PRN
Status: DISCONTINUED | OUTPATIENT
Start: 2023-04-25 | End: 2023-04-25 | Stop reason: SDUPTHER

## 2023-04-25 RX ORDER — SODIUM CHLORIDE 0.9 % (FLUSH) 0.9 %
5-40 SYRINGE (ML) INJECTION EVERY 12 HOURS SCHEDULED
Status: DISCONTINUED | OUTPATIENT
Start: 2023-04-25 | End: 2023-04-25 | Stop reason: HOSPADM

## 2023-04-25 RX ORDER — SODIUM CHLORIDE 9 MG/ML
INJECTION, SOLUTION INTRAVENOUS CONTINUOUS PRN
Status: DISCONTINUED | OUTPATIENT
Start: 2023-04-25 | End: 2023-04-25 | Stop reason: SDUPTHER

## 2023-04-25 RX ORDER — SODIUM CHLORIDE 9 MG/ML
25 INJECTION, SOLUTION INTRAVENOUS PRN
Status: DISCONTINUED | OUTPATIENT
Start: 2023-04-25 | End: 2023-04-25 | Stop reason: HOSPADM

## 2023-04-25 RX ADMIN — SODIUM CHLORIDE: 9 INJECTION, SOLUTION INTRAVENOUS at 14:28

## 2023-04-25 RX ADMIN — SODIUM CHLORIDE: 9 INJECTION, SOLUTION INTRAVENOUS at 14:07

## 2023-04-25 RX ADMIN — PROPOFOL 600 MG: 10 INJECTION, EMULSION INTRAVENOUS at 14:08

## 2023-04-25 ASSESSMENT — ENCOUNTER SYMPTOMS
VOMITING: 0
BLOOD IN STOOL: 0
PHOTOPHOBIA: 0
DIARRHEA: 0
EYE PAIN: 0
ABDOMINAL PAIN: 0
BACK PAIN: 0
SHORTNESS OF BREATH: 0
NAUSEA: 0
CONSTIPATION: 0
EYE DISCHARGE: 0

## 2023-04-25 ASSESSMENT — LIFESTYLE VARIABLES: SMOKING_STATUS: 0

## 2023-04-25 ASSESSMENT — PAIN - FUNCTIONAL ASSESSMENT: PAIN_FUNCTIONAL_ASSESSMENT: 0-10

## 2023-04-25 ASSESSMENT — PAIN DESCRIPTION - LOCATION: LOCATION: ABDOMEN

## 2023-04-25 ASSESSMENT — PAIN SCALES - GENERAL: PAINLEVEL_OUTOF10: 0

## 2023-04-25 NOTE — DISCHARGE INSTRUCTIONS
POST-PROCEDURE INSTRUCTIONS FOR COLONOSCOPY    [facility]  4/25/2023    You underwent an colonoscopy today with polypectomy (snare cautery) x 12    You were found to have 5 polyps in the sigmoid colon, 2 in the transverse and 5 polyps in the descending colon all removed with hot snare cautery. Two were larger than 1cm. You may experience: Increased amount of gas. Slight abdominal bloating and/or cramping. Drowsiness and/or forgetfulness. Although I used cautery, with your need to restart blood thinners you may have some bleeding    Diet:  You may resume your normal diet. Plan: You may resume your prescribed medications. Await pathology. I will have you follow up with my colleague Dr. Sukhdev Montes for a repeat colonoscopy in 3 months  Restart coumadin this evening    Activity: no driving today. Ok to resume regular activity starting tomorrow    Follow-up: Please call [phone] with any questions or concerns and to schedule a follow up visit in 2-3 weeks with Dr. Kym Claros.

## 2023-04-25 NOTE — H&P
Hepatobiliary and Pancreatic Surgery Attending History and Physical    Patient's Name/Date of Birth: Thai Nicole /1970 (72 y.o.)    Date: April 25, 2023     CC:screening colonoscopy    HPI:  Patient is well known to me due to him having a pancreatic cyst.  He now presents for a screening colonoscopy. He denies any abdominal pain or weight loss. He is moving his bowels daily. Past Medical History:   Diagnosis Date    Clotting disorder ([facility] [address]     DVT (deep venous thrombosis) (HCC)     Hypertension     Lupus anticoagulant disorder (HCC)     Nocturnal hypoxemia     DEMETRIUS (obstructive sleep apnea)     Oxygen dependent     2 L/M NC at night only    Seizure disorder [facility])     Patient is not aware of the cause of the seizures - last was about 12/2021       Past Surgical History:   Procedure Laterality Date    AORTIC VALVE REPLACEMENT  01/2019    CHOLECYSTECTOMY      HERNIA REPAIR      x 2       No current facility-administered medications for this encounter. Current Outpatient Medications   Medication Sig Dispense Refill    enoxaparin (LOVENOX) 100 MG/ML Inject into the skin 2 times daily 100 mg BID - Patient reports he received instructions to take Lovenox up to the day of surgery and resume Coumadin 4/26/2023      beclomethasone (QVAR REDIHALER) 40 MCG/ACT AERB inhaler Inhale 1 puff into the lungs in the morning and 1 puff in the evening. 1 each 5    Brivaracetam (BRIVIACT) 50 MG tablet Take 1 tablet by mouth 2 times daily for 30 days. 60 tablet 11    beclomethasone (QVAR REDIHALER) 40 MCG/ACT AERB inhaler Inhale 2 puffs into the lungs in the morning and 2 puffs in the evening. 3 each 3    aspirin 81 MG EC tablet Take 1 tablet by mouth daily      vitamin D3 (CHOLECALCIFEROL) 125 MCG (5000 UT) TABS tablet Vitamin D3 125 mcg (5,000 unit) tablet   Take 1 tablet every day by oral route.       metoprolol succinate (TOPROL XL) 50 MG extended release tablet 1 tablet every morning      warfarin (COUMADIN) 7.5 MG

## 2023-04-25 NOTE — ANESTHESIA POSTPROCEDURE EVALUATION
Department of Anesthesiology  Postprocedure Note    Patient: Tee Velez  MRN: [id number]  YOB: 1970  Date of evaluation: 4/25/2023      Procedure Summary     Date: 04/25/23 Room / Location: [place] [address] / [facility]    Anesthesia Start: 8012 Anesthesia Stop: 1450    Procedures:       COLONOSCOPY WITH BIOPSY      COLONOSCOPY POLYPECTOMY SNARE/COLD BIOPSY Diagnosis:       Special screening for malignant neoplasms, colon      (Special screening for malignant neoplasms, colon [Z12.11])    Surgeons: Justa Damon III, MD Responsible Provider: Mukund Elise DO    Anesthesia Type: MAC ASA Status: 3          Anesthesia Type: MAC    Michelle Phase I: Michelle Score: 10    Michelle Phase II:        Anesthesia Post Evaluation    Patient location during evaluation: bedside  Patient participation: complete - patient participated  Level of consciousness: awake  Pain score: 1  Airway patency: patent  Nausea & Vomiting: no vomiting and no nausea  Complications: no  Cardiovascular status: hemodynamically stable  Respiratory status: acceptable  Hydration status: stable

## 2023-04-25 NOTE — OP NOTE
to Dr. Albaro Holt for his next colonoscopy    Electronically signed by Jessica Kay MD on 4/25/23 at 2:51 PM EDT

## 2023-04-28 ENCOUNTER — TELEPHONE (OUTPATIENT)
Dept: HEMATOLOGY | Age: 53
End: 2023-04-28

## 2023-04-28 ENCOUNTER — HOSPITAL ENCOUNTER (OUTPATIENT)
Dept: CT IMAGING | Age: 53
Discharge: HOME | End: 2023-04-28
Payer: COMMERCIAL

## 2023-04-28 DIAGNOSIS — D36.9 TUBULAR ADENOMA: Primary | ICD-10-CM

## 2023-04-28 DIAGNOSIS — R50.9 FEVER, UNSPECIFIED FEVER CAUSE: ICD-10-CM

## 2023-04-28 DIAGNOSIS — D36.9 TUBULAR ADENOMA: ICD-10-CM

## 2023-04-28 PROCEDURE — 6360000004 HC RX CONTRAST MEDICATION: Performed by: RADIOLOGY

## 2023-04-28 PROCEDURE — 2580000003 HC RX 258: Performed by: RADIOLOGY

## 2023-04-28 PROCEDURE — 74177 CT ABD & PELVIS W/CONTRAST: CPT

## 2023-04-28 RX ORDER — SODIUM CHLORIDE 0.9 % (FLUSH) 0.9 %
10 SYRINGE (ML) INJECTION PRN
Status: COMPLETED | OUTPATIENT
Start: 2023-04-28 | End: 2023-04-28

## 2023-04-28 RX ADMIN — Medication 10 ML: at 15:06

## 2023-04-28 RX ADMIN — IOPAMIDOL 80 ML: 755 INJECTION, SOLUTION INTRAVENOUS at 15:06

## 2023-04-28 NOTE — TELEPHONE ENCOUNTER
Patient called in to report a fever of 103 last evening and not feeling well today. He had a colonoscopy on Tuesday. He denies any abdominal pain or any other symptoms other than just generalized not feeling well. I spoke to Dr. Karin Morales and he would like a STAT CT of the abdomen and pelvis since he had the recent  colonoscopy. I called and got patient scheduled today at [facility] at 2:00pm.  I called the patient back and he was made aware of the CT that was ordered. He was instructed to arrive at [facility] at 1:00pm, not to eat or drink anything until CT is completed. He is aware of the radiology registration desk is located. He verbalized understanding and he confirmed this appt.     Electronically signed by Lara Rossi RN on 4/28/2023 at 11:53 AM

## 2023-05-01 ENCOUNTER — OFFICE VISIT (OUTPATIENT)
Dept: NEUROLOGY | Age: 53
End: 2023-05-01
Payer: COMMERCIAL

## 2023-05-01 VITALS
RESPIRATION RATE: 18 BRPM | TEMPERATURE: 98.4 F | BODY MASS INDEX: 29.37 KG/M2 | SYSTOLIC BLOOD PRESSURE: 141 MMHG | OXYGEN SATURATION: 97 % | DIASTOLIC BLOOD PRESSURE: 85 MMHG | HEIGHT: 75 IN | HEART RATE: 76 BPM

## 2023-05-01 DIAGNOSIS — R56.9 NOCTURNAL SEIZURE (HCC): Primary | ICD-10-CM

## 2023-05-01 DIAGNOSIS — G47.33 OSA (OBSTRUCTIVE SLEEP APNEA): ICD-10-CM

## 2023-05-01 PROCEDURE — 99214 OFFICE O/P EST MOD 30 MIN: CPT | Performed by: NURSE PRACTITIONER

## 2023-05-01 NOTE — PROGRESS NOTES
[facility]. Tex Dowell M.D., F.A.C.P. Leticia Horvath, DNP, APRN, ACNS-BC  Estephania Walters. Gearold Ormond, MSN, APRN-FNP-C  Ap Infante, MSN, APRN-FNP-C  RENE Sams, PA-C  Jose Lindsey, MSN, APRN-FNP-C  [address], [facility] [place], [address]  Phone: [phone]  Fax: [phone]         Melchor Arce is a 45 y.o. right handed male     Patient is a hospital follow-up for seizures    He is accompanied by his wife today    Presented to ED for evaluation of an episode of shaking from sleep. The patient's wife had recorded video of the event prompting his admission. Prior to the beginning of the video she states he had had extension of all limbs followed by flexion of the arms with shaking. The video then demonstrates stertorous breathing with blood from a bit on the interior of the lower lip running down his cheek. He began trying to sit up, but was still disoriented. After this the patient reportedly wandered the house somewhat confusedly for about 15 minutes. The patient had just been discharged home from the hospital the day prior. The initial suspicion had been for sleep apnea, but the patient had some memory loss around this episode as well as the one prompting the current admission. A prior episode of generalized convulsions from sleep also occurred in October 2022, but this was thought to be related to an infection at that time. This admission he is also noted to have an abrasion from the bite on his interior lower lip as well as bilateral humeral fractures proximally. He has no history of seizures. No history of birth complications, developmental delay, learning disabilities, concussion with LOC, stroke, CNS neoplasm or infection. MRI brain completed during his hospital stay showed slightly smaller left parietal area but no acute findings. EEG was normal.  Patient was started on Keppra 1000 mg twice daily and reports no seizures.

## 2023-05-04 PROBLEM — R04.2 COUGH WITH HEMOPTYSIS: Status: ACTIVE | Noted: 2023-05-04

## 2023-05-04 PROBLEM — R93.89 ABNORMAL CHEST CT: Status: ACTIVE | Noted: 2023-05-04

## 2023-05-04 PROBLEM — Z12.11 SPECIAL SCREENING FOR MALIGNANT NEOPLASMS, COLON: Status: RESOLVED | Noted: 2023-04-04 | Resolved: 2023-05-04

## 2023-07-06 ENCOUNTER — TELEPHONE (OUTPATIENT)
Dept: GASTROENTEROLOGY | Age: 53
End: 2023-07-06

## 2023-07-06 ENCOUNTER — PREP FOR PROCEDURE (OUTPATIENT)
Dept: GASTROENTEROLOGY | Age: 53
End: 2023-07-06

## 2023-07-06 PROBLEM — Z86.010 PERSONAL HISTORY OF COLONIC POLYPS: Status: ACTIVE | Noted: 2023-07-06

## 2023-07-06 PROBLEM — Z86.0100 PERSONAL HISTORY OF COLONIC POLYPS: Status: ACTIVE | Noted: 2023-07-06

## 2023-07-06 NOTE — TELEPHONE ENCOUNTER
Prior Authorization Form:      DEMOGRAPHICS:                     Patient Name:  Misbah Crawford  Patient :  1970            Insurance:  Payor: Gladys Brown / Plan: Beba Oreilly / Product Type: *No Product type* /   Insurance ID Number:    Payer/Plan Subscr  Sex Relation Sub. Ins. ID Effective Group Num   1. [address] 1970 Male Self [id number]                                   [address]   2.  Sheldon Putt Misbah Crawford 1970 Male Self [id number]                                    [address]         DIAGNOSIS & PROCEDURE:                       Procedure/Operation: Colonoscopy           CPT Code: 40499    Diagnosis:  Personal hx of colon polyps    ICD10 Code: Z86.010    Location:  [address] [place] [address]    Surgeon:  Dr. Tj Blank     SCHEDULING INFORMATION:                          Date: 2023    Time: TBD              Anesthesia:  MAC/TIVA                                                       Status:  Outpatient            Electronically signed by Billy Arroyo MA on 2023 at 7:55 AM

## 2023-07-06 NOTE — TELEPHONE ENCOUNTER
PAT called regarding patient coumadin directions. Spoke with Alo Montana CNP . She said patient is to hold his Coumadin 5 days prior to procedure and then wait 24 hours before starting the Lovenox injections. He is not to take a Lovenox injection the morning of procedure and post op will give directions for when he is to resume his coumadin. Left message on patient voice mail to call office for these directions since he was asking also, Electronically signed by Maya Snider LPN on 3/2/9214 at 4:65 PM        Unable to reach patient after two phones calls. Left detailed message on how to do his medications prior to procedure and to call if has questions. Electronically signed by Maya Snider LPN on 4/9/8182 at 6:05 PM

## 2023-07-17 ENCOUNTER — ANESTHESIA EVENT (OUTPATIENT)
Dept: ENDOSCOPY | Age: 53
End: 2023-07-17

## 2023-07-17 ENCOUNTER — TELEPHONE (OUTPATIENT)
Dept: GASTROENTEROLOGY | Age: 53
End: 2023-07-17

## 2023-07-17 NOTE — TELEPHONE ENCOUNTER
Patient called. Started with Afib last night. HR is around 84  Has had afib off and on for a few years. Saw PCP this morning. Recommended that the patient call our office because he is scheduled for a colonoscopy tomorrow. Patient is currently on Lovenox. Dr. Bear Ronquillo notified. He is agreeable to proceed with the colonoscopy. He advises the patient take his cardiac medications before leaving for the hospital and to hold Lovenox in the morning. Patient verbalized understanding of above instructions.

## 2023-07-18 ENCOUNTER — ANESTHESIA (OUTPATIENT)
Dept: ENDOSCOPY | Age: 53
End: 2023-07-18

## 2023-07-18 ASSESSMENT — LIFESTYLE VARIABLES: SMOKING_STATUS: 0

## 2023-07-18 NOTE — ANESTHESIA PRE PROCEDURE
Department of Anesthesiology  Preprocedure Note       Name:  Nova Curtis   Age:  45 y.o.  :  1970                                          MRN:  [id number]         Date:  2023      Surgeon: Tawana Antonio):  Estuardo Murillo MD    Procedure: Procedure(s):  COLONOSCOPY DIAGNOSTIC    Medications prior to admission:   Prior to Admission medications    Medication Sig Start Date End Date Taking? Authorizing Provider   enoxaparin (LOVENOX) 100 MG/ML Hold coumadin as directed. Inject 2 times daily prior to procedure, hold injection the morning of procedure. 23   TERRIE Ramos CNP   enoxaparin (LOVENOX) 100 MG/ML Inject into the skin 2 times daily 100 mg BID - Patient reports he received instructions to take Lovenox up to the day of surgery and resume Coumadin 2023  Patient not taking: Reported on 2023    Historical Provider, MD   Brivaracetam (BRIVIACT) 50 MG tablet Take 1 tablet by mouth 2 times daily for 30 days.  22  TERRIE May CNP   beclomethasone (QVAR REDIHALER) 40 MCG/ACT AERB inhaler Inhale 2 puffs into the lungs in the morning and 2 puffs in the evening. 22   Alma Lindquist DO   aspirin 81 MG EC tablet Take 1 tablet by mouth daily    Historical Provider, MD   vitamin D3 (CHOLECALCIFEROL) 125 MCG (5000 UT) TABS tablet every other day    Historical Provider, MD   metoprolol succinate (TOPROL XL) 50 MG extended release tablet 1 tablet every morning 22   Historical Provider, MD   warfarin (COUMADIN) 7.5 MG tablet Take 1 tablet by mouth Five times weekly on , Monday, Tuesday, Thursday and Friday    Historical Provider, MD   dilTIAZem (CARDIZEM CD) 120 MG extended release capsule Take 1 capsule by mouth every evening    Historical Provider, MD   fluticasone (FLONASE) 50 MCG/ACT nasal spray 2 sprays in each nostril bid  Patient taking differently: Taking prn 19   Fernando Anna DO       Current medications:    No current outpatient medications

## 2023-08-05 PROBLEM — R04.2 COUGH WITH HEMOPTYSIS: Status: RESOLVED | Noted: 2023-05-04 | Resolved: 2023-08-05

## 2023-08-05 PROBLEM — R05.3 COVID-19 LONG HAULER MANIFESTING CHRONIC COUGH: Status: RESOLVED | Noted: 2022-09-30 | Resolved: 2023-08-05

## 2023-08-05 PROBLEM — U07.1 PNEUMONIA DUE TO COVID-19 VIRUS: Status: RESOLVED | Noted: 2023-03-10 | Resolved: 2023-08-05

## 2023-08-05 PROBLEM — J12.82 PNEUMONIA DUE TO COVID-19 VIRUS: Status: RESOLVED | Noted: 2023-03-10 | Resolved: 2023-08-05

## 2023-08-05 PROBLEM — U09.9 COVID-19 LONG HAULER MANIFESTING CHRONIC COUGH: Status: RESOLVED | Noted: 2022-09-30 | Resolved: 2023-08-05

## 2023-09-11 ENCOUNTER — APPOINTMENT (OUTPATIENT)
Dept: GENERAL RADIOLOGY | Age: 53
DRG: 309 | End: 2023-09-11
Payer: COMMERCIAL

## 2023-09-11 ENCOUNTER — HOSPITAL ENCOUNTER (INPATIENT)
Age: 53
LOS: 2 days | Discharge: HOME | DRG: 309 | End: 2023-09-14
Attending: EMERGENCY MEDICINE | Admitting: INTERNAL MEDICINE
Payer: COMMERCIAL

## 2023-09-11 DIAGNOSIS — M25.40 JOINT SWELLING: Primary | ICD-10-CM

## 2023-09-11 DIAGNOSIS — Z86.2 HISTORY OF BLOOD CLOTTING DISORDER: ICD-10-CM

## 2023-09-11 DIAGNOSIS — Z79.01 CHRONIC ANTICOAGULATION: ICD-10-CM

## 2023-09-11 DIAGNOSIS — Z86.79 HISTORY OF ATRIAL FIBRILLATION: ICD-10-CM

## 2023-09-11 DIAGNOSIS — R26.2 DIFFICULTY IN WALKING INVOLVING ANKLE AND FOOT JOINT: ICD-10-CM

## 2023-09-11 DIAGNOSIS — R80.9 PROTEINURIA, UNSPECIFIED TYPE: ICD-10-CM

## 2023-09-11 DIAGNOSIS — N17.9 AKI (ACUTE KIDNEY INJURY) (HCC): ICD-10-CM

## 2023-09-11 DIAGNOSIS — D50.9 MICROCYTIC ANEMIA: ICD-10-CM

## 2023-09-11 DIAGNOSIS — R70.0 ELEVATED SED RATE: ICD-10-CM

## 2023-09-11 LAB
ALBUMIN SERPL-MCNC: 3.4 G/DL (ref 3.5–5.2)
ALP SERPL-CCNC: 50 U/L (ref 40–129)
ALT SERPL-CCNC: 13 U/L (ref 0–40)
ANION GAP SERPL CALCULATED.3IONS-SCNC: 12 MMOL/L (ref 7–16)
AST SERPL-CCNC: 16 U/L (ref 0–39)
BASOPHILS # BLD: 0 K/UL (ref 0–0.2)
BASOPHILS NFR BLD: 0 % (ref 0–2)
BILIRUB SERPL-MCNC: 0.2 MG/DL (ref 0–1.2)
BILIRUB UR QL STRIP: NEGATIVE
BNP SERPL-MCNC: 997 PG/ML (ref 0–125)
BUN SERPL-MCNC: 29 MG/DL (ref 6–20)
CALCIUM SERPL-MCNC: 8.7 MG/DL (ref 8.6–10.2)
CHLORIDE SERPL-SCNC: 102 MMOL/L (ref 98–107)
CLARITY UR: CLEAR
CO2 SERPL-SCNC: 20 MMOL/L (ref 22–29)
COLOR UR: YELLOW
CREAT SERPL-MCNC: 1.5 MG/DL (ref 0.7–1.2)
EOSINOPHIL # BLD: 0.09 K/UL (ref 0.05–0.5)
EOSINOPHILS RELATIVE PERCENT: 3 % (ref 0–6)
ERYTHROCYTE [DISTWIDTH] IN BLOOD BY AUTOMATED COUNT: 17.8 % (ref 11.5–15)
ERYTHROCYTE [SEDIMENTATION RATE] IN BLOOD BY WESTERGREN METHOD: 41 MM/HR (ref 0–15)
GFR SERPL CREATININE-BSD FRML MDRD: 56 ML/MIN/1.73M2
GLUCOSE SERPL-MCNC: 92 MG/DL (ref 74–99)
GLUCOSE UR STRIP-MCNC: NEGATIVE MG/DL
HCT VFR BLD AUTO: 33.8 % (ref 37–54)
HGB BLD-MCNC: 10.6 G/DL (ref 12.5–16.5)
HGB UR QL STRIP.AUTO: ABNORMAL
INR PPP: 3.4
KETONES UR STRIP-MCNC: NEGATIVE MG/DL
LEUKOCYTE ESTERASE UR QL STRIP: NEGATIVE
LYMPHOCYTES NFR BLD: 0.37 K/UL (ref 1.5–4)
LYMPHOCYTES RELATIVE PERCENT: 11 % (ref 20–42)
MCH RBC QN AUTO: 23.2 PG (ref 26–35)
MCHC RBC AUTO-ENTMCNC: 31.4 G/DL (ref 32–34.5)
MCV RBC AUTO: 74 FL (ref 80–99.9)
MONOCYTES NFR BLD: 0.22 K/UL (ref 0.1–0.95)
MONOCYTES NFR BLD: 6 % (ref 2–12)
NEUTROPHILS NFR BLD: 80 % (ref 43–80)
NEUTS SEG NFR BLD: 2.72 K/UL (ref 1.8–7.3)
NITRITE UR QL STRIP: NEGATIVE
PH UR STRIP: 6 [PH] (ref 5–9)
PLATELET # BLD AUTO: 278 K/UL (ref 130–450)
PMV BLD AUTO: 9.7 FL (ref 7–12)
POTASSIUM SERPL-SCNC: 4.7 MMOL/L (ref 3.5–5)
PROT SERPL-MCNC: 6.5 G/DL (ref 6.4–8.3)
PROT UR STRIP-MCNC: >=300 MG/DL
PROTHROMBIN TIME: 37.4 SEC (ref 9.3–12.4)
RBC # BLD AUTO: 4.57 M/UL (ref 3.8–5.8)
RBC # BLD: ABNORMAL 10*6/UL
RBC #/AREA URNS HPF: ABNORMAL /HPF
SODIUM SERPL-SCNC: 134 MMOL/L (ref 132–146)
SP GR UR STRIP: 1.02 (ref 1–1.03)
TROPONIN I SERPL HS-MCNC: 27 NG/L (ref 0–11)
UROBILINOGEN UR STRIP-ACNC: 0.2 EU/DL (ref 0–1)
WBC #/AREA URNS HPF: ABNORMAL /HPF
WBC OTHER # BLD: 3.4 K/UL (ref 4.5–11.5)

## 2023-09-11 PROCEDURE — 85610 PROTHROMBIN TIME: CPT

## 2023-09-11 PROCEDURE — 84300 ASSAY OF URINE SODIUM: CPT

## 2023-09-11 PROCEDURE — 99285 EMERGENCY DEPT VISIT HI MDM: CPT

## 2023-09-11 PROCEDURE — 84484 ASSAY OF TROPONIN QUANT: CPT

## 2023-09-11 PROCEDURE — 93005 ELECTROCARDIOGRAM TRACING: CPT | Performed by: NURSE PRACTITIONER

## 2023-09-11 PROCEDURE — 81001 URINALYSIS AUTO W/SCOPE: CPT

## 2023-09-11 PROCEDURE — 85652 RBC SED RATE AUTOMATED: CPT

## 2023-09-11 PROCEDURE — 71046 X-RAY EXAM CHEST 2 VIEWS: CPT

## 2023-09-11 PROCEDURE — 82570 ASSAY OF URINE CREATININE: CPT

## 2023-09-11 PROCEDURE — 80053 COMPREHEN METABOLIC PANEL: CPT

## 2023-09-11 PROCEDURE — 85025 COMPLETE CBC W/AUTO DIFF WBC: CPT

## 2023-09-11 PROCEDURE — 83880 ASSAY OF NATRIURETIC PEPTIDE: CPT

## 2023-09-11 ASSESSMENT — PAIN SCALES - GENERAL: PAINLEVEL_OUTOF10: 8

## 2023-09-12 ENCOUNTER — APPOINTMENT (OUTPATIENT)
Dept: ULTRASOUND IMAGING | Age: 53
DRG: 309 | End: 2023-09-12
Payer: COMMERCIAL

## 2023-09-12 PROBLEM — D50.9 MICROCYTIC ANEMIA: Status: ACTIVE | Noted: 2023-09-12

## 2023-09-12 PROBLEM — I48.91 ATRIAL FIBRILLATION WITH RVR (HCC): Status: ACTIVE | Noted: 2023-09-12

## 2023-09-12 PROBLEM — I48.0 PAF (PAROXYSMAL ATRIAL FIBRILLATION) (HCC): Status: ACTIVE | Noted: 2023-09-12

## 2023-09-12 PROBLEM — M25.40 SWELLING OF MULTIPLE JOINTS: Status: ACTIVE | Noted: 2023-09-12

## 2023-09-12 LAB
ANION GAP SERPL CALCULATED.3IONS-SCNC: 10 MMOL/L (ref 7–16)
BASOPHILS # BLD: 0.02 K/UL (ref 0–0.2)
BASOPHILS NFR BLD: 1 % (ref 0–2)
BUN SERPL-MCNC: 27 MG/DL (ref 6–20)
CALCIUM SERPL-MCNC: 8.3 MG/DL (ref 8.6–10.2)
CHLORIDE SERPL-SCNC: 105 MMOL/L (ref 98–107)
CO2 SERPL-SCNC: 18 MMOL/L (ref 22–29)
CREAT SERPL-MCNC: 1.3 MG/DL (ref 0.7–1.2)
CREAT UR-MCNC: 66.8 MG/DL (ref 40–278)
EOSINOPHIL # BLD: 0 K/UL (ref 0.05–0.5)
EOSINOPHILS RELATIVE PERCENT: 0 % (ref 0–6)
ERYTHROCYTE [DISTWIDTH] IN BLOOD BY AUTOMATED COUNT: 17.6 % (ref 11.5–15)
GFR SERPL CREATININE-BSD FRML MDRD: >60 ML/MIN/1.73M2
GLUCOSE SERPL-MCNC: 96 MG/DL (ref 74–99)
HBA1C MFR BLD: 5.8 % (ref 4–5.6)
HCT VFR BLD AUTO: 32.2 % (ref 37–54)
HGB BLD-MCNC: 9.8 G/DL (ref 12.5–16.5)
INR PPP: 4.4
LYMPHOCYTES NFR BLD: 0.4 K/UL (ref 1.5–4)
LYMPHOCYTES RELATIVE PERCENT: 17 % (ref 20–42)
MCH RBC QN AUTO: 22.4 PG (ref 26–35)
MCHC RBC AUTO-ENTMCNC: 30.4 G/DL (ref 32–34.5)
MCV RBC AUTO: 73.5 FL (ref 80–99.9)
MONOCYTES NFR BLD: 0.11 K/UL (ref 0.1–0.95)
MONOCYTES NFR BLD: 4 % (ref 2–12)
MYELOCYTES ABSOLUTE COUNT: 0.02 K/UL
MYELOCYTES: 1 %
NEUTROPHILS NFR BLD: 77 % (ref 43–80)
NEUTS SEG NFR BLD: 1.85 K/UL (ref 1.8–7.3)
PLATELET # BLD AUTO: 253 K/UL (ref 130–450)
PMV BLD AUTO: 9.6 FL (ref 7–12)
POTASSIUM SERPL-SCNC: 4.7 MMOL/L (ref 3.5–5)
PROTHROMBIN TIME: 49.5 SEC (ref 9.3–12.4)
RBC # BLD AUTO: 4.38 M/UL (ref 3.8–5.8)
RBC # BLD: ABNORMAL 10*6/UL
SODIUM SERPL-SCNC: 133 MMOL/L (ref 132–146)
SODIUM UR-SCNC: 49 MMOL/L
TROPONIN I SERPL HS-MCNC: 24 NG/L (ref 0–11)
TROPONIN I SERPL HS-MCNC: 24 NG/L (ref 0–11)
TSH SERPL DL<=0.05 MIU/L-ACNC: 2.05 UIU/ML (ref 0.27–4.2)
URATE SERPL-MCNC: 7.3 MG/DL (ref 3.4–7)
WBC OTHER # BLD: 2.4 K/UL (ref 4.5–11.5)

## 2023-09-12 PROCEDURE — 2060000000 HC ICU INTERMEDIATE R&B

## 2023-09-12 PROCEDURE — 2580000003 HC RX 258: Performed by: NURSE PRACTITIONER

## 2023-09-12 PROCEDURE — 84550 ASSAY OF BLOOD/URIC ACID: CPT

## 2023-09-12 PROCEDURE — 87040 BLOOD CULTURE FOR BACTERIA: CPT

## 2023-09-12 PROCEDURE — 94640 AIRWAY INHALATION TREATMENT: CPT

## 2023-09-12 PROCEDURE — 80048 BASIC METABOLIC PNL TOTAL CA: CPT

## 2023-09-12 PROCEDURE — 2500000003 HC RX 250 WO HCPCS

## 2023-09-12 PROCEDURE — 2500000003 HC RX 250 WO HCPCS: Performed by: NURSE PRACTITIONER

## 2023-09-12 PROCEDURE — 84484 ASSAY OF TROPONIN QUANT: CPT

## 2023-09-12 PROCEDURE — 6370000000 HC RX 637 (ALT 250 FOR IP): Performed by: NURSE PRACTITIONER

## 2023-09-12 PROCEDURE — 83036 HEMOGLOBIN GLYCOSYLATED A1C: CPT

## 2023-09-12 PROCEDURE — 85025 COMPLETE CBC W/AUTO DIFF WBC: CPT

## 2023-09-12 PROCEDURE — 93970 EXTREMITY STUDY: CPT

## 2023-09-12 PROCEDURE — 85610 PROTHROMBIN TIME: CPT

## 2023-09-12 PROCEDURE — 84443 ASSAY THYROID STIM HORMONE: CPT

## 2023-09-12 PROCEDURE — 99223 1ST HOSP IP/OBS HIGH 75: CPT | Performed by: INTERNAL MEDICINE

## 2023-09-12 PROCEDURE — 6360000002 HC RX W HCPCS: Performed by: NURSE PRACTITIONER

## 2023-09-12 PROCEDURE — 6370000000 HC RX 637 (ALT 250 FOR IP): Performed by: INTERNAL MEDICINE

## 2023-09-12 RX ORDER — DILTIAZEM HYDROCHLORIDE 120 MG/1
120 CAPSULE, COATED, EXTENDED RELEASE ORAL EVERY EVENING
Status: DISCONTINUED | OUTPATIENT
Start: 2023-09-12 | End: 2023-09-14 | Stop reason: HOSPADM

## 2023-09-12 RX ORDER — FLUTICASONE PROPIONATE 50 MCG
2 SPRAY, SUSPENSION (ML) NASAL DAILY PRN
COMMUNITY

## 2023-09-12 RX ORDER — SODIUM CHLORIDE 0.9 % (FLUSH) 0.9 %
5-40 SYRINGE (ML) INJECTION PRN
Status: DISCONTINUED | OUTPATIENT
Start: 2023-09-12 | End: 2023-09-14 | Stop reason: HOSPADM

## 2023-09-12 RX ORDER — ACETAMINOPHEN 325 MG/1
650 TABLET ORAL EVERY 6 HOURS PRN
Status: DISCONTINUED | OUTPATIENT
Start: 2023-09-12 | End: 2023-09-14 | Stop reason: HOSPADM

## 2023-09-12 RX ORDER — VITAMIN B COMPLEX
5000 TABLET ORAL EVERY OTHER DAY
Status: DISCONTINUED | OUTPATIENT
Start: 2023-09-12 | End: 2023-09-14 | Stop reason: HOSPADM

## 2023-09-12 RX ORDER — ACETAMINOPHEN 650 MG/1
650 SUPPOSITORY RECTAL EVERY 6 HOURS PRN
Status: DISCONTINUED | OUTPATIENT
Start: 2023-09-12 | End: 2023-09-14 | Stop reason: HOSPADM

## 2023-09-12 RX ORDER — ONDANSETRON 2 MG/ML
4 INJECTION INTRAMUSCULAR; INTRAVENOUS EVERY 6 HOURS PRN
Status: DISCONTINUED | OUTPATIENT
Start: 2023-09-12 | End: 2023-09-14 | Stop reason: HOSPADM

## 2023-09-12 RX ORDER — METOPROLOL SUCCINATE 25 MG/1
50 TABLET, EXTENDED RELEASE ORAL ONCE
Status: COMPLETED | OUTPATIENT
Start: 2023-09-12 | End: 2023-09-12

## 2023-09-12 RX ORDER — PREDNISONE 5 MG/1
5 TABLET ORAL 2 TIMES DAILY
COMMUNITY

## 2023-09-12 RX ORDER — DILTIAZEM HYDROCHLORIDE 5 MG/ML
5 INJECTION INTRAVENOUS ONCE
Status: COMPLETED | OUTPATIENT
Start: 2023-09-12 | End: 2023-09-12

## 2023-09-12 RX ORDER — DILTIAZEM HYDROCHLORIDE 5 MG/ML
10 INJECTION INTRAVENOUS ONCE
Status: COMPLETED | OUTPATIENT
Start: 2023-09-12 | End: 2023-09-12

## 2023-09-12 RX ORDER — BISACODYL 5 MG/1
5 TABLET, DELAYED RELEASE ORAL DAILY PRN
Status: DISCONTINUED | OUTPATIENT
Start: 2023-09-12 | End: 2023-09-14 | Stop reason: HOSPADM

## 2023-09-12 RX ORDER — 0.9 % SODIUM CHLORIDE 0.9 %
500 INTRAVENOUS SOLUTION INTRAVENOUS ONCE
Status: CANCELLED | OUTPATIENT
Start: 2023-09-12 | End: 2023-09-12

## 2023-09-12 RX ORDER — ONDANSETRON 4 MG/1
4 TABLET, ORALLY DISINTEGRATING ORAL EVERY 8 HOURS PRN
Status: DISCONTINUED | OUTPATIENT
Start: 2023-09-12 | End: 2023-09-14 | Stop reason: HOSPADM

## 2023-09-12 RX ORDER — METOPROLOL SUCCINATE 25 MG/1
50 TABLET, EXTENDED RELEASE ORAL EVERY MORNING
Status: DISCONTINUED | OUTPATIENT
Start: 2023-09-12 | End: 2023-09-12

## 2023-09-12 RX ORDER — METOPROLOL SUCCINATE 100 MG/1
100 TABLET, EXTENDED RELEASE ORAL EVERY MORNING
Status: DISCONTINUED | OUTPATIENT
Start: 2023-09-13 | End: 2023-09-14 | Stop reason: HOSPADM

## 2023-09-12 RX ORDER — BUDESONIDE 0.25 MG/2ML
250 INHALANT ORAL 2 TIMES DAILY
Status: DISCONTINUED | OUTPATIENT
Start: 2023-09-12 | End: 2023-09-14 | Stop reason: HOSPADM

## 2023-09-12 RX ORDER — PREDNISONE 5 MG/1
10 TABLET ORAL 2 TIMES DAILY
Status: DISCONTINUED | OUTPATIENT
Start: 2023-09-12 | End: 2023-09-14 | Stop reason: HOSPADM

## 2023-09-12 RX ORDER — SODIUM CHLORIDE 0.9 % (FLUSH) 0.9 %
5-40 SYRINGE (ML) INJECTION EVERY 12 HOURS SCHEDULED
Status: DISCONTINUED | OUTPATIENT
Start: 2023-09-12 | End: 2023-09-14 | Stop reason: HOSPADM

## 2023-09-12 RX ORDER — ASPIRIN 81 MG/1
81 TABLET ORAL DAILY
Status: DISCONTINUED | OUTPATIENT
Start: 2023-09-12 | End: 2023-09-14 | Stop reason: HOSPADM

## 2023-09-12 RX ORDER — SODIUM CHLORIDE 9 MG/ML
INJECTION, SOLUTION INTRAVENOUS PRN
Status: DISCONTINUED | OUTPATIENT
Start: 2023-09-12 | End: 2023-09-14 | Stop reason: HOSPADM

## 2023-09-12 RX ADMIN — METOPROLOL SUCCINATE 50 MG: 25 TABLET, EXTENDED RELEASE ORAL at 11:39

## 2023-09-12 RX ADMIN — DILTIAZEM HYDROCHLORIDE 5 MG: 5 INJECTION INTRAVENOUS at 10:37

## 2023-09-12 RX ADMIN — DILTIAZEM HYDROCHLORIDE 10 MG: 5 INJECTION INTRAVENOUS at 04:53

## 2023-09-12 RX ADMIN — SODIUM CHLORIDE 15 MG/HR: 900 INJECTION, SOLUTION INTRAVENOUS at 18:50

## 2023-09-12 RX ADMIN — SODIUM CHLORIDE, PRESERVATIVE FREE 10 ML: 5 INJECTION INTRAVENOUS at 09:39

## 2023-09-12 RX ADMIN — SODIUM CHLORIDE 15 MG/HR: 900 INJECTION, SOLUTION INTRAVENOUS at 16:21

## 2023-09-12 RX ADMIN — SODIUM CHLORIDE 5 MG/HR: 900 INJECTION, SOLUTION INTRAVENOUS at 10:56

## 2023-09-12 RX ADMIN — METOPROLOL SUCCINATE 50 MG: 25 TABLET, EXTENDED RELEASE ORAL at 09:58

## 2023-09-12 RX ADMIN — BUDESONIDE 250 MCG: 0.25 SUSPENSION RESPIRATORY (INHALATION) at 21:09

## 2023-09-12 RX ADMIN — PREDNISONE 10 MG: 5 TABLET ORAL at 19:39

## 2023-09-12 RX ADMIN — ASPIRIN 81 MG: 81 TABLET, COATED ORAL at 09:39

## 2023-09-12 ASSESSMENT — LIFESTYLE VARIABLES
HOW MANY STANDARD DRINKS CONTAINING ALCOHOL DO YOU HAVE ON A TYPICAL DAY: PATIENT DOES NOT DRINK
HOW OFTEN DO YOU HAVE A DRINK CONTAINING ALCOHOL: NEVER

## 2023-09-12 NOTE — H&P
hospital problems. *    Patient is a 73-year-old male admitted to [facility] for  Swelling of multiple joints, multiple underlying rheumatological issues including hypercoagulable state  -Coumadin therapy with caution-hold today as INR is elevated to 4.4  -Monitor labs  -Check CRP sed rate and DEIRDRE  -Rheumatology consulted however no inpatient coverage  -Uric acid 7.3  -Please see HPI for further information  -Continue p.o. steroids for now for joint aches and pains, will increase dose to 20 p.o. twice daily for relief of joint aches and pains    Atrial fibrillation with RVR with known history of AVR, on Coumadin therapy  -Monitor labs  -Continue warfarin, pharmacy dosing  -Started on Cardizem drip  -Home metoprolol increased by cardiology  -Echo  -Appreciate cardiology input    DEVAN  -Monitor labs  -29/1.5 > 27/1.3  -Baseline creatinine 1.2  -Avoid nephrotoxins  -Nephro following      Medication for other comorbidities continue as appropriate dose adjustment as necessary.     DVT prophylaxis Warfarin   PT OT  Discharge planning      Code status: Full  Requires Inpatient level of care    Penny Levine MD

## 2023-09-12 NOTE — ED NOTES
Pt pump giving error code channel replaced and restarted at previous rate. Pt bp on 15 min intervals.       Patricia Lu RN  09/12/23 8472

## 2023-09-13 LAB
ANION GAP SERPL CALCULATED.3IONS-SCNC: 12 MMOL/L (ref 7–16)
BACTERIA URNS QL MICRO: ABNORMAL
BASOPHILS # BLD: 0.02 K/UL (ref 0–0.2)
BASOPHILS NFR BLD: 0 % (ref 0–2)
BILIRUB UR QL STRIP: NEGATIVE
BUN SERPL-MCNC: 25 MG/DL (ref 6–20)
CALCIUM SERPL-MCNC: 8.7 MG/DL (ref 8.6–10.2)
CHLORIDE SERPL-SCNC: 102 MMOL/L (ref 98–107)
CHOLEST SERPL-MCNC: 117 MG/DL
CLARITY UR: CLEAR
CO2 SERPL-SCNC: 19 MMOL/L (ref 22–29)
COLOR UR: YELLOW
CREAT SERPL-MCNC: 1.4 MG/DL (ref 0.7–1.2)
CRP SERPL HS-MCNC: 62 MG/L (ref 0–5)
EKG ATRIAL RATE: 70 BPM
EKG P AXIS: 53 DEGREES
EKG P-R INTERVAL: 144 MS
EKG Q-T INTERVAL: 402 MS
EKG QRS DURATION: 104 MS
EKG QTC CALCULATION (BAZETT): 434 MS
EKG R AXIS: 48 DEGREES
EKG T AXIS: 35 DEGREES
EKG VENTRICULAR RATE: 70 BPM
EOSINOPHIL # BLD: 0.02 K/UL (ref 0.05–0.5)
EOSINOPHILS RELATIVE PERCENT: 0 % (ref 0–6)
ERYTHROCYTE [DISTWIDTH] IN BLOOD BY AUTOMATED COUNT: 17.6 % (ref 11.5–15)
GFR SERPL CREATININE-BSD FRML MDRD: 58 ML/MIN/1.73M2
GLUCOSE SERPL-MCNC: 121 MG/DL (ref 74–99)
GLUCOSE UR STRIP-MCNC: NEGATIVE MG/DL
HCT VFR BLD AUTO: 35.3 % (ref 37–54)
HDLC SERPL-MCNC: 24 MG/DL
HGB BLD-MCNC: 11 G/DL (ref 12.5–16.5)
HGB UR QL STRIP.AUTO: ABNORMAL
IMM GRANULOCYTES # BLD AUTO: 0.04 K/UL (ref 0–0.58)
IMM GRANULOCYTES NFR BLD: 1 % (ref 0–5)
INR PPP: 4.7
KETONES UR STRIP-MCNC: NEGATIVE MG/DL
LDLC SERPL CALC-MCNC: 61 MG/DL
LEUKOCYTE ESTERASE UR QL STRIP: NEGATIVE
LYMPHOCYTES NFR BLD: 0.88 K/UL (ref 1.5–4)
LYMPHOCYTES RELATIVE PERCENT: 17 % (ref 20–42)
MCH RBC QN AUTO: 23 PG (ref 26–35)
MCHC RBC AUTO-ENTMCNC: 31.2 G/DL (ref 32–34.5)
MCV RBC AUTO: 73.7 FL (ref 80–99.9)
MONOCYTES NFR BLD: 0.18 K/UL (ref 0.1–0.95)
MONOCYTES NFR BLD: 3 % (ref 2–12)
NEUTROPHILS NFR BLD: 78 % (ref 43–80)
NEUTS SEG NFR BLD: 4.11 K/UL (ref 1.8–7.3)
NITRITE UR QL STRIP: NEGATIVE
PH UR STRIP: 6 [PH] (ref 5–9)
PLATELET # BLD AUTO: 270 K/UL (ref 130–450)
PMV BLD AUTO: 9.8 FL (ref 7–12)
POTASSIUM SERPL-SCNC: 4.9 MMOL/L (ref 3.5–5)
PROT UR STRIP-MCNC: >=300 MG/DL
PROTHROMBIN TIME: 52.6 SEC (ref 9.3–12.4)
RBC # BLD AUTO: 4.79 M/UL (ref 3.8–5.8)
RBC #/AREA URNS HPF: ABNORMAL /HPF
SODIUM SERPL-SCNC: 133 MMOL/L (ref 132–146)
SP GR UR STRIP: 1.02 (ref 1–1.03)
TRIGL SERPL-MCNC: 161 MG/DL
UROBILINOGEN UR STRIP-ACNC: 0.2 EU/DL (ref 0–1)
VLDLC SERPL CALC-MCNC: 32 MG/DL
WBC #/AREA URNS HPF: ABNORMAL /HPF
WBC OTHER # BLD: 5.3 K/UL (ref 4.5–11.5)
YEAST URNS QL MICRO: PRESENT

## 2023-09-13 PROCEDURE — 2580000003 HC RX 258: Performed by: NURSE PRACTITIONER

## 2023-09-13 PROCEDURE — 2500000003 HC RX 250 WO HCPCS: Performed by: NURSE PRACTITIONER

## 2023-09-13 PROCEDURE — 2060000000 HC ICU INTERMEDIATE R&B

## 2023-09-13 PROCEDURE — 6360000002 HC RX W HCPCS: Performed by: NURSE PRACTITIONER

## 2023-09-13 PROCEDURE — 86140 C-REACTIVE PROTEIN: CPT

## 2023-09-13 PROCEDURE — 84156 ASSAY OF PROTEIN URINE: CPT

## 2023-09-13 PROCEDURE — 94640 AIRWAY INHALATION TREATMENT: CPT

## 2023-09-13 PROCEDURE — 93306 TTE W/DOPPLER COMPLETE: CPT

## 2023-09-13 PROCEDURE — 82043 UR ALBUMIN QUANTITATIVE: CPT

## 2023-09-13 PROCEDURE — 86256 FLUORESCENT ANTIBODY TITER: CPT

## 2023-09-13 PROCEDURE — 81001 URINALYSIS AUTO W/SCOPE: CPT

## 2023-09-13 PROCEDURE — 80061 LIPID PANEL: CPT

## 2023-09-13 PROCEDURE — 80048 BASIC METABOLIC PNL TOTAL CA: CPT

## 2023-09-13 PROCEDURE — 99233 SBSQ HOSP IP/OBS HIGH 50: CPT | Performed by: INTERNAL MEDICINE

## 2023-09-13 PROCEDURE — 93010 ELECTROCARDIOGRAM REPORT: CPT | Performed by: INTERNAL MEDICINE

## 2023-09-13 PROCEDURE — 85652 RBC SED RATE AUTOMATED: CPT

## 2023-09-13 PROCEDURE — 6360000004 HC RX CONTRAST MEDICATION: Performed by: NURSE PRACTITIONER

## 2023-09-13 PROCEDURE — 6370000000 HC RX 637 (ALT 250 FOR IP): Performed by: INTERNAL MEDICINE

## 2023-09-13 PROCEDURE — 86160 COMPLEMENT ANTIGEN: CPT

## 2023-09-13 PROCEDURE — 6370000000 HC RX 637 (ALT 250 FOR IP): Performed by: NURSE PRACTITIONER

## 2023-09-13 PROCEDURE — 86039 ANTINUCLEAR ANTIBODIES (ANA): CPT

## 2023-09-13 PROCEDURE — 86431 RHEUMATOID FACTOR QUANT: CPT

## 2023-09-13 PROCEDURE — 86038 ANTINUCLEAR ANTIBODIES: CPT

## 2023-09-13 PROCEDURE — 83516 IMMUNOASSAY NONANTIBODY: CPT

## 2023-09-13 PROCEDURE — 82570 ASSAY OF URINE CREATININE: CPT

## 2023-09-13 PROCEDURE — 86225 DNA ANTIBODY NATIVE: CPT

## 2023-09-13 PROCEDURE — 80074 ACUTE HEPATITIS PANEL: CPT

## 2023-09-13 PROCEDURE — 85610 PROTHROMBIN TIME: CPT

## 2023-09-13 PROCEDURE — 85025 COMPLETE CBC W/AUTO DIFF WBC: CPT

## 2023-09-13 RX ORDER — HYDROCODONE BITARTRATE AND ACETAMINOPHEN 10; 325 MG/1; MG/1
1 TABLET ORAL EVERY 4 HOURS PRN
Status: DISCONTINUED | OUTPATIENT
Start: 2023-09-13 | End: 2023-09-14 | Stop reason: HOSPADM

## 2023-09-13 RX ADMIN — METOPROLOL SUCCINATE 100 MG: 100 TABLET, EXTENDED RELEASE ORAL at 08:14

## 2023-09-13 RX ADMIN — SODIUM CHLORIDE 15 MG/HR: 900 INJECTION, SOLUTION INTRAVENOUS at 01:33

## 2023-09-13 RX ADMIN — PREDNISONE 10 MG: 5 TABLET ORAL at 20:18

## 2023-09-13 RX ADMIN — SODIUM CHLORIDE 12.5 MG/HR: 900 INJECTION, SOLUTION INTRAVENOUS at 22:33

## 2023-09-13 RX ADMIN — ASPIRIN 81 MG: 81 TABLET, COATED ORAL at 08:14

## 2023-09-13 RX ADMIN — ACETAMINOPHEN 650 MG: 325 TABLET ORAL at 08:14

## 2023-09-13 RX ADMIN — SODIUM CHLORIDE 15 MG/HR: 900 INJECTION, SOLUTION INTRAVENOUS at 08:19

## 2023-09-13 RX ADMIN — PERFLUTREN 1.5 ML: 6.52 INJECTION, SUSPENSION INTRAVENOUS at 14:23

## 2023-09-13 RX ADMIN — SODIUM CHLORIDE 15 MG/HR: 900 INJECTION, SOLUTION INTRAVENOUS at 15:26

## 2023-09-13 RX ADMIN — SODIUM CHLORIDE, PRESERVATIVE FREE 10 ML: 5 INJECTION INTRAVENOUS at 08:15

## 2023-09-13 RX ADMIN — BUDESONIDE 250 MCG: 0.25 SUSPENSION RESPIRATORY (INHALATION) at 08:01

## 2023-09-13 RX ADMIN — PREDNISONE 10 MG: 5 TABLET ORAL at 08:14

## 2023-09-13 ASSESSMENT — PAIN DESCRIPTION - ORIENTATION: ORIENTATION: OTHER (COMMENT)

## 2023-09-13 ASSESSMENT — PAIN DESCRIPTION - LOCATION: LOCATION: HEAD

## 2023-09-13 ASSESSMENT — PAIN SCALES - GENERAL
PAINLEVEL_OUTOF10: 0
PAINLEVEL_OUTOF10: 5

## 2023-09-13 ASSESSMENT — PAIN DESCRIPTION - DESCRIPTORS: DESCRIPTORS: ACHING

## 2023-09-13 ASSESSMENT — PAIN DESCRIPTION - FREQUENCY: FREQUENCY: INTERMITTENT

## 2023-09-13 ASSESSMENT — PAIN DESCRIPTION - PAIN TYPE: TYPE: ACUTE PAIN

## 2023-09-13 NOTE — PLAN OF CARE
Problem: ABCDS Injury Assessment  Goal: Absence of physical injury  Outcome: Progressing     Problem: Discharge Planning  Goal: Discharge to home or other facility with appropriate resources  Outcome: Progressing     Problem: Pain  Goal: Verbalizes/displays adequate comfort level or baseline comfort level  Outcome: Progressing     Problem: Cardiovascular - Adult  Goal: Maintains optimal cardiac output and hemodynamic stability  Outcome: Progressing  Goal: Absence of cardiac dysrhythmias or at baseline  Outcome: Progressing

## 2023-09-13 NOTE — CARE COORDINATION
Social Work discharge [facility] with  pt for discharge planning.,  He lives with wife and 2 children in 2 story. Pt reporting independence with walking and adls. He drives. Pt said he does not need PT OT here. He said they only dme he has is Home 02 2L FOR USE AT [address] [facility] from [facility] set up by the [place]. Pt said his PCP is Dr Bryant Irwin, but he also uses the [facility] for more expensive prescriptions. Pt denied needs for discharge planning other than to see Rheumatologist.   Plan is home.    Electronically signed by Colene Juan on 9/13/2023 at 1:46 PM

## 2023-09-14 ENCOUNTER — APPOINTMENT (OUTPATIENT)
Dept: ULTRASOUND IMAGING | Age: 53
DRG: 309 | End: 2023-09-14
Payer: COMMERCIAL

## 2023-09-14 VITALS
WEIGHT: 235.5 LBS | TEMPERATURE: 97.6 F | OXYGEN SATURATION: 98 % | DIASTOLIC BLOOD PRESSURE: 86 MMHG | RESPIRATION RATE: 18 BRPM | SYSTOLIC BLOOD PRESSURE: 146 MMHG | HEIGHT: 75 IN | BODY MASS INDEX: 29.28 KG/M2 | HEART RATE: 81 BPM

## 2023-09-14 LAB
ALBUMIN SERPL-MCNC: 2.9 G/DL (ref 3.5–5.2)
ALP SERPL-CCNC: 44 U/L (ref 40–129)
ALT SERPL-CCNC: 10 U/L (ref 0–40)
ANION GAP SERPL CALCULATED.3IONS-SCNC: 10 MMOL/L (ref 7–16)
AST SERPL-CCNC: 12 U/L (ref 0–39)
BASOPHILS # BLD: 0.01 K/UL (ref 0–0.2)
BASOPHILS NFR BLD: 0 % (ref 0–2)
BILIRUB SERPL-MCNC: <0.2 MG/DL (ref 0–1.2)
BUN SERPL-MCNC: 25 MG/DL (ref 6–20)
C3 SERPL-MCNC: 72 MG/DL (ref 90–180)
C4 SERPL-MCNC: 13 MG/DL (ref 10–40)
CALCIUM SERPL-MCNC: 8.3 MG/DL (ref 8.6–10.2)
CHLORIDE SERPL-SCNC: 105 MMOL/L (ref 98–107)
CO2 SERPL-SCNC: 20 MMOL/L (ref 22–29)
CREAT SERPL-MCNC: 1.4 MG/DL (ref 0.7–1.2)
CREAT UR-MCNC: 110.4 MG/DL (ref 40–278)
CREAT UR-MCNC: 111.4 MG/DL (ref 40–278)
EOSINOPHIL # BLD: 0.01 K/UL (ref 0.05–0.5)
EOSINOPHILS RELATIVE PERCENT: 0 % (ref 0–6)
ERYTHROCYTE [DISTWIDTH] IN BLOOD BY AUTOMATED COUNT: 17.7 % (ref 11.5–15)
ERYTHROCYTE [SEDIMENTATION RATE] IN BLOOD BY WESTERGREN METHOD: 48 MM/HR (ref 0–15)
GFR SERPL CREATININE-BSD FRML MDRD: 59 ML/MIN/1.73M2
GLUCOSE SERPL-MCNC: 136 MG/DL (ref 74–99)
HAV IGM SERPL QL IA: NONREACTIVE
HBV CORE IGM SERPL QL IA: NONREACTIVE
HBV SURFACE AG SERPL QL IA: NONREACTIVE
HCT VFR BLD AUTO: 29.5 % (ref 37–54)
HCV AB SERPL QL IA: NONREACTIVE
HGB BLD-MCNC: 9.3 G/DL (ref 12.5–16.5)
IMM GRANULOCYTES # BLD AUTO: 0.03 K/UL (ref 0–0.58)
IMM GRANULOCYTES NFR BLD: 1 % (ref 0–5)
INR PPP: 2.7
LYMPHOCYTES NFR BLD: 0.61 K/UL (ref 1.5–4)
LYMPHOCYTES RELATIVE PERCENT: 13 % (ref 20–42)
MAGNESIUM SERPL-MCNC: 2 MG/DL (ref 1.6–2.6)
MCH RBC QN AUTO: 23.1 PG (ref 26–35)
MCHC RBC AUTO-ENTMCNC: 31.5 G/DL (ref 32–34.5)
MCV RBC AUTO: 73.2 FL (ref 80–99.9)
MICROALBUMIN UR-MCNC: 3591 MG/L (ref 0–19)
MICROALBUMIN/CREAT UR-RTO: 3252 MCG/MG CREAT (ref 0–30)
MONOCYTES NFR BLD: 0.23 K/UL (ref 0.1–0.95)
MONOCYTES NFR BLD: 5 % (ref 2–12)
NEUTROPHILS NFR BLD: 82 % (ref 43–80)
NEUTS SEG NFR BLD: 3.92 K/UL (ref 1.8–7.3)
PHOSPHATE SERPL-MCNC: 3.5 MG/DL (ref 2.5–4.5)
PLATELET # BLD AUTO: 244 K/UL (ref 130–450)
PMV BLD AUTO: 9.6 FL (ref 7–12)
POTASSIUM SERPL-SCNC: 4.8 MMOL/L (ref 3.5–5)
PROT SERPL-MCNC: 5.9 G/DL (ref 6.4–8.3)
PROTHROMBIN TIME: 30 SEC (ref 9.3–12.4)
RBC # BLD AUTO: 4.03 M/UL (ref 3.8–5.8)
RHEUMATOID FACT SER NEPH-ACNC: <10 IU/ML (ref 0–13)
SODIUM SERPL-SCNC: 135 MMOL/L (ref 132–146)
TOTAL PROTEIN, URINE: 483 MG/DL (ref 0–12)
URINE TOTAL PROTEIN CREATININE RATIO: 4.33 (ref 0–0.2)
WBC OTHER # BLD: 4.8 K/UL (ref 4.5–11.5)

## 2023-09-14 PROCEDURE — 85610 PROTHROMBIN TIME: CPT

## 2023-09-14 PROCEDURE — 83735 ASSAY OF MAGNESIUM: CPT

## 2023-09-14 PROCEDURE — 36415 COLL VENOUS BLD VENIPUNCTURE: CPT

## 2023-09-14 PROCEDURE — 6370000000 HC RX 637 (ALT 250 FOR IP): Performed by: NURSE PRACTITIONER

## 2023-09-14 PROCEDURE — 94640 AIRWAY INHALATION TREATMENT: CPT

## 2023-09-14 PROCEDURE — P9047 ALBUMIN (HUMAN), 25%, 50ML: HCPCS | Performed by: FAMILY MEDICINE

## 2023-09-14 PROCEDURE — 6370000000 HC RX 637 (ALT 250 FOR IP): Performed by: INTERNAL MEDICINE

## 2023-09-14 PROCEDURE — 85025 COMPLETE CBC W/AUTO DIFF WBC: CPT

## 2023-09-14 PROCEDURE — 6360000002 HC RX W HCPCS: Performed by: NURSE PRACTITIONER

## 2023-09-14 PROCEDURE — 80053 COMPREHEN METABOLIC PANEL: CPT

## 2023-09-14 PROCEDURE — 99233 SBSQ HOSP IP/OBS HIGH 50: CPT | Performed by: INTERNAL MEDICINE

## 2023-09-14 PROCEDURE — 84100 ASSAY OF PHOSPHORUS: CPT

## 2023-09-14 PROCEDURE — 76770 US EXAM ABDO BACK WALL COMP: CPT

## 2023-09-14 PROCEDURE — 6360000002 HC RX W HCPCS: Performed by: FAMILY MEDICINE

## 2023-09-14 PROCEDURE — 2580000003 HC RX 258: Performed by: NURSE PRACTITIONER

## 2023-09-14 RX ORDER — WARFARIN SODIUM 5 MG/1
7.5 TABLET ORAL EVERY EVENING
Qty: 30 TABLET | Refills: 3 | Status: SHIPPED | OUTPATIENT
Start: 2023-09-14

## 2023-09-14 RX ORDER — ENOXAPARIN SODIUM 150 MG/ML
1 INJECTION SUBCUTANEOUS 2 TIMES DAILY
Qty: 21.9 ML | Refills: 0 | Status: SHIPPED | OUTPATIENT
Start: 2023-09-14 | End: 2023-09-29

## 2023-09-14 RX ORDER — ALBUMIN (HUMAN) 12.5 G/50ML
50 SOLUTION INTRAVENOUS ONCE
Status: COMPLETED | OUTPATIENT
Start: 2023-09-14 | End: 2023-09-14

## 2023-09-14 RX ORDER — AMIODARONE HYDROCHLORIDE 200 MG/1
400 TABLET ORAL 2 TIMES DAILY
Status: DISCONTINUED | OUTPATIENT
Start: 2023-09-14 | End: 2023-09-14 | Stop reason: HOSPADM

## 2023-09-14 RX ORDER — ENOXAPARIN SODIUM 150 MG/ML
1 INJECTION SUBCUTANEOUS 2 TIMES DAILY
Status: DISCONTINUED | OUTPATIENT
Start: 2023-09-14 | End: 2023-09-14 | Stop reason: HOSPADM

## 2023-09-14 RX ORDER — AMIODARONE HYDROCHLORIDE 400 MG/1
400 TABLET ORAL 2 TIMES DAILY
Qty: 60 TABLET | Refills: 5 | Status: SHIPPED | OUTPATIENT
Start: 2023-09-14 | End: 2023-10-14

## 2023-09-14 RX ORDER — WARFARIN SODIUM 5 MG/1
5 TABLET ORAL
Status: COMPLETED | OUTPATIENT
Start: 2023-09-14 | End: 2023-09-14

## 2023-09-14 RX ORDER — METOPROLOL SUCCINATE 100 MG/1
100 TABLET, EXTENDED RELEASE ORAL EVERY MORNING
Qty: 30 TABLET | Refills: 3 | Status: SHIPPED | OUTPATIENT
Start: 2023-09-15

## 2023-09-14 RX ADMIN — ASPIRIN 81 MG: 81 TABLET, COATED ORAL at 10:46

## 2023-09-14 RX ADMIN — WARFARIN SODIUM 5 MG: 5 TABLET ORAL at 17:27

## 2023-09-14 RX ADMIN — BUDESONIDE 250 MCG: 0.25 SUSPENSION RESPIRATORY (INHALATION) at 09:33

## 2023-09-14 RX ADMIN — AMIODARONE HYDROCHLORIDE 400 MG: 200 TABLET ORAL at 10:45

## 2023-09-14 RX ADMIN — Medication 5000 UNITS: at 10:46

## 2023-09-14 RX ADMIN — METOPROLOL SUCCINATE 100 MG: 100 TABLET, EXTENDED RELEASE ORAL at 10:46

## 2023-09-14 RX ADMIN — PREDNISONE 10 MG: 5 TABLET ORAL at 10:46

## 2023-09-14 RX ADMIN — SODIUM CHLORIDE, PRESERVATIVE FREE 10 ML: 5 INJECTION INTRAVENOUS at 11:00

## 2023-09-14 RX ADMIN — ALBUMIN (HUMAN) 50 G: 0.25 INJECTION, SOLUTION INTRAVENOUS at 10:58

## 2023-09-14 NOTE — DISCHARGE SUMMARY
[facility]   Discharge summary   Patient ID:  Martinez Steele  [id number]  52 y.o.  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)    Antiphospholipid syndrome (HCC)    DEMETRIUS (obstructive sleep apnea)    Excessive daytime sleepiness    Bruxism    Nocturnal oxygen desaturation    Abnormal chest CT    Personal history of colonic polyps    Joint swelling    Atrial fibrillation with RVR (HCC)    PAF (paroxysmal atrial fibrillation) (HCC)    Microcytic anemia       Discharge Diagnoses: PAF, atrial fibrillation with RVR, joint swelling, hypercoagulable state, supratherapeutic INR,     Consults: cardiology and nephrology    Procedures: none    Hospital Course: Patient is a 49-year-old male with past medical history of clotting disorder, DVT on warfarin, HTN, lupus, DEMETRIUS, seizures, PAF on warfarin who presents to the emergency room for chest pain and joint swelling. Patient states that he has been having ongoing chest pain with joint swelling for the last 6 weeks in which he was put on prednisone by his PCP and instructed to follow-up with rheumatology however has not been able to obtain an appointment. On arrival to emergency patient chest x-ray which was negative anything acute. Labs revealed an DEVAN of 29/1.5, proBNP of 97, troponin 27-14-14, leukopenia 3.4, anemia 10.6/3.8, INR 3.4, with hematuria noted on UA. Patient was found to be in atrial fibrillation with RVR in which cardiology was consulted for further evaluation. Patient admitted immediate plan for further work-up and treatment on 9/11/2023.  His hospital course and problems progressed as

## 2023-09-15 LAB
ANA PAT SER IF-IMP: ABNORMAL
ANA SER QL IA: POSITIVE
ANA TITER: >=640
ANTI DNA DOUBLE STRANDED: POSITIVE
ANTI DNA TITER: >=640

## 2023-09-17 LAB
GBM AB SER-ACNC: <1.9 AU/ML (ref 0–7)
MICROORGANISM SPEC CULT: NORMAL
MICROORGANISM SPEC CULT: NORMAL
SERVICE CMNT-IMP: NORMAL
SERVICE CMNT-IMP: NORMAL
SPECIMEN DESCRIPTION: NORMAL
SPECIMEN DESCRIPTION: NORMAL

## 2023-09-18 LAB
DEPRECATED S PNEUM 1 IGG SER-MCNC: 18 AU/ML (ref 0–19)
SERINE PROTEASE 3, IGG: 0 AU/ML (ref 0–19)

## 2023-09-27 ENCOUNTER — HOSPITAL ENCOUNTER (OUTPATIENT)
Age: 53
Discharge: HOME | End: 2023-09-27
Payer: COMMERCIAL

## 2023-09-27 LAB
25(OH)D3 SERPL-MCNC: 34 NG/ML (ref 30–100)
ANION GAP SERPL CALCULATED.3IONS-SCNC: 10 MMOL/L (ref 7–16)
BACTERIA URNS QL MICRO: ABNORMAL
BILIRUB UR QL STRIP: NEGATIVE
BUN SERPL-MCNC: 34 MG/DL (ref 6–20)
CALCIUM SERPL-MCNC: 8.5 MG/DL (ref 8.6–10.2)
CHLORIDE SERPL-SCNC: 103 MMOL/L (ref 98–107)
CLARITY UR: CLEAR
CO2 SERPL-SCNC: 21 MMOL/L (ref 22–29)
COLOR UR: YELLOW
CREAT SERPL-MCNC: 1.4 MG/DL (ref 0.7–1.2)
CRP SERPL HS-MCNC: 8 MG/L (ref 0–5)
ERYTHROCYTE [DISTWIDTH] IN BLOOD BY AUTOMATED COUNT: 18 % (ref 11.5–15)
ERYTHROCYTE [SEDIMENTATION RATE] IN BLOOD BY WESTERGREN METHOD: 41 MM/HR (ref 0–15)
GFR SERPL CREATININE-BSD FRML MDRD: 59 ML/MIN/1.73M2
GLUCOSE SERPL-MCNC: 122 MG/DL (ref 74–99)
GLUCOSE UR STRIP-MCNC: NEGATIVE MG/DL
HCT VFR BLD AUTO: 28.9 % (ref 37–54)
HGB BLD-MCNC: 9.1 G/DL (ref 12.5–16.5)
HGB UR QL STRIP.AUTO: ABNORMAL
KETONES UR STRIP-MCNC: NEGATIVE MG/DL
LEUKOCYTE ESTERASE UR QL STRIP: NEGATIVE
MCH RBC QN AUTO: 22.8 PG (ref 26–35)
MCHC RBC AUTO-ENTMCNC: 31.5 G/DL (ref 32–34.5)
MCV RBC AUTO: 72.4 FL (ref 80–99.9)
NITRITE UR QL STRIP: NEGATIVE
PH UR STRIP: 6 [PH] (ref 5–9)
PLATELET # BLD AUTO: 269 K/UL (ref 130–450)
PMV BLD AUTO: 9.1 FL (ref 7–12)
POTASSIUM SERPL-SCNC: 4.6 MMOL/L (ref 3.5–5)
PROT UR STRIP-MCNC: >=300 MG/DL
PTH-INTACT SERPL-MCNC: 29.9 PG/ML (ref 15–65)
RBC # BLD AUTO: 3.99 M/UL (ref 3.8–5.8)
RBC #/AREA URNS HPF: ABNORMAL /HPF
SODIUM SERPL-SCNC: 134 MMOL/L (ref 132–146)
SP GR UR STRIP: 1.02 (ref 1–1.03)
URATE SERPL-MCNC: 6.4 MG/DL (ref 3.4–7)
UROBILINOGEN UR STRIP-ACNC: 0.2 EU/DL (ref 0–1)
WBC #/AREA URNS HPF: ABNORMAL /HPF
WBC OTHER # BLD: 3.9 K/UL (ref 4.5–11.5)

## 2023-09-27 PROCEDURE — 36415 COLL VENOUS BLD VENIPUNCTURE: CPT

## 2023-09-27 PROCEDURE — 87086 URINE CULTURE/COLONY COUNT: CPT

## 2023-09-27 PROCEDURE — 86140 C-REACTIVE PROTEIN: CPT

## 2023-09-27 PROCEDURE — 85652 RBC SED RATE AUTOMATED: CPT

## 2023-09-27 PROCEDURE — 85027 COMPLETE CBC AUTOMATED: CPT

## 2023-09-27 PROCEDURE — 83970 ASSAY OF PARATHORMONE: CPT

## 2023-09-27 PROCEDURE — 82306 VITAMIN D 25 HYDROXY: CPT

## 2023-09-27 PROCEDURE — 80048 BASIC METABOLIC PNL TOTAL CA: CPT

## 2023-09-27 PROCEDURE — 84550 ASSAY OF BLOOD/URIC ACID: CPT

## 2023-09-27 PROCEDURE — 81001 URINALYSIS AUTO W/SCOPE: CPT

## 2023-09-28 LAB
MICROORGANISM SPEC CULT: NO GROWTH
SPECIMEN DESCRIPTION: NORMAL

## 2023-10-05 ENCOUNTER — HOSPITAL ENCOUNTER (OUTPATIENT)
Dept: CT IMAGING | Age: 53
Discharge: HOME | End: 2023-10-07
Payer: COMMERCIAL

## 2023-10-05 VITALS
TEMPERATURE: 97.6 F | SYSTOLIC BLOOD PRESSURE: 139 MMHG | RESPIRATION RATE: 20 BRPM | HEART RATE: 78 BPM | DIASTOLIC BLOOD PRESSURE: 82 MMHG | OXYGEN SATURATION: 96 %

## 2023-10-05 DIAGNOSIS — R52 PAIN: ICD-10-CM

## 2023-10-05 DIAGNOSIS — D68.62 LA (LUPUS ANTICOAGULANT) DISORDER (HCC): ICD-10-CM

## 2023-10-05 LAB
ANION GAP SERPL CALCULATED.3IONS-SCNC: 7 MMOL/L (ref 7–16)
BASOPHILS # BLD: 0.01 K/UL (ref 0–0.2)
BASOPHILS NFR BLD: 0 % (ref 0–2)
BUN SERPL-MCNC: 22 MG/DL (ref 6–20)
CALCIUM SERPL-MCNC: 8.5 MG/DL (ref 8.6–10.2)
CHLORIDE SERPL-SCNC: 105 MMOL/L (ref 98–107)
CO2 SERPL-SCNC: 23 MMOL/L (ref 22–29)
CREAT SERPL-MCNC: 1.4 MG/DL (ref 0.7–1.2)
EOSINOPHIL # BLD: 0.02 K/UL (ref 0.05–0.5)
EOSINOPHILS RELATIVE PERCENT: 0 % (ref 0–6)
ERYTHROCYTE [DISTWIDTH] IN BLOOD BY AUTOMATED COUNT: 19.5 % (ref 11.5–15)
GFR SERPL CREATININE-BSD FRML MDRD: 59 ML/MIN/1.73M2
GLUCOSE SERPL-MCNC: 114 MG/DL (ref 74–99)
HCT VFR BLD AUTO: 31.4 % (ref 37–54)
HGB BLD-MCNC: 9.7 G/DL (ref 12.5–16.5)
IMM GRANULOCYTES # BLD AUTO: 0.05 K/UL (ref 0–0.58)
IMM GRANULOCYTES NFR BLD: 1 % (ref 0–5)
INR PPP: 1.1
LYMPHOCYTES NFR BLD: 0.76 K/UL (ref 1.5–4)
LYMPHOCYTES RELATIVE PERCENT: 17 % (ref 20–42)
MCH RBC QN AUTO: 23.4 PG (ref 26–35)
MCHC RBC AUTO-ENTMCNC: 30.9 G/DL (ref 32–34.5)
MCV RBC AUTO: 75.7 FL (ref 80–99.9)
MONOCYTES NFR BLD: 0.28 K/UL (ref 0.1–0.95)
MONOCYTES NFR BLD: 6 % (ref 2–12)
NEUTROPHILS NFR BLD: 76 % (ref 43–80)
NEUTS SEG NFR BLD: 3.47 K/UL (ref 1.8–7.3)
PLATELET # BLD AUTO: 143 K/UL (ref 130–450)
PMV BLD AUTO: 10.1 FL (ref 7–12)
POTASSIUM SERPL-SCNC: 4.6 MMOL/L (ref 3.5–5)
PROTHROMBIN TIME: 12.4 SEC (ref 9.3–12.4)
RBC # BLD AUTO: 4.15 M/UL (ref 3.8–5.8)
SODIUM SERPL-SCNC: 135 MMOL/L (ref 132–146)
WBC OTHER # BLD: 4.6 K/UL (ref 4.5–11.5)

## 2023-10-05 PROCEDURE — 36415 COLL VENOUS BLD VENIPUNCTURE: CPT

## 2023-10-05 PROCEDURE — 7100000011 HC PHASE II RECOVERY - ADDTL 15 MIN

## 2023-10-05 PROCEDURE — 6370000000 HC RX 637 (ALT 250 FOR IP): Performed by: RADIOLOGY

## 2023-10-05 PROCEDURE — 50200 RENAL BIOPSY PERQ: CPT

## 2023-10-05 PROCEDURE — 2500000003 HC RX 250 WO HCPCS: Performed by: RADIOLOGY

## 2023-10-05 PROCEDURE — 2709999900 CT GUIDED NEEDLE PLACEMENT

## 2023-10-05 PROCEDURE — 80048 BASIC METABOLIC PNL TOTAL CA: CPT

## 2023-10-05 PROCEDURE — 6360000002 HC RX W HCPCS: Performed by: RADIOLOGY

## 2023-10-05 PROCEDURE — 77012 CT SCAN FOR NEEDLE BIOPSY: CPT

## 2023-10-05 PROCEDURE — 85610 PROTHROMBIN TIME: CPT

## 2023-10-05 PROCEDURE — 85025 COMPLETE CBC W/AUTO DIFF WBC: CPT

## 2023-10-05 PROCEDURE — 7100000010 HC PHASE II RECOVERY - FIRST 15 MIN

## 2023-10-05 PROCEDURE — 74150 CT ABDOMEN W/O CONTRAST: CPT

## 2023-10-05 RX ORDER — HYDRALAZINE HYDROCHLORIDE 20 MG/ML
INJECTION INTRAMUSCULAR; INTRAVENOUS PRN
Status: COMPLETED | OUTPATIENT
Start: 2023-10-05 | End: 2023-10-05

## 2023-10-05 RX ORDER — AMLODIPINE BESYLATE 5 MG/1
5 TABLET ORAL ONCE
Status: COMPLETED | OUTPATIENT
Start: 2023-10-05 | End: 2023-10-05

## 2023-10-05 RX ORDER — FENTANYL CITRATE 50 UG/ML
INJECTION, SOLUTION INTRAMUSCULAR; INTRAVENOUS PRN
Status: COMPLETED | OUTPATIENT
Start: 2023-10-05 | End: 2023-10-05

## 2023-10-05 RX ORDER — HYDROMORPHONE HYDROCHLORIDE 1 MG/ML
1 INJECTION, SOLUTION INTRAMUSCULAR; INTRAVENOUS; SUBCUTANEOUS ONCE
Status: COMPLETED | OUTPATIENT
Start: 2023-10-05 | End: 2023-10-05

## 2023-10-05 RX ORDER — KETOROLAC TROMETHAMINE 30 MG/ML
30 INJECTION, SOLUTION INTRAMUSCULAR; INTRAVENOUS ONCE
Status: COMPLETED | OUTPATIENT
Start: 2023-10-05 | End: 2023-10-05

## 2023-10-05 RX ORDER — DIPHENHYDRAMINE HYDROCHLORIDE 50 MG/ML
INJECTION INTRAMUSCULAR; INTRAVENOUS PRN
Status: COMPLETED | OUTPATIENT
Start: 2023-10-05 | End: 2023-10-05

## 2023-10-05 RX ORDER — LABETALOL HYDROCHLORIDE 5 MG/ML
5 INJECTION, SOLUTION INTRAVENOUS ONCE
Status: COMPLETED | OUTPATIENT
Start: 2023-10-05 | End: 2023-10-05

## 2023-10-05 RX ORDER — MIDAZOLAM HYDROCHLORIDE 2 MG/2ML
INJECTION, SOLUTION INTRAMUSCULAR; INTRAVENOUS PRN
Status: COMPLETED | OUTPATIENT
Start: 2023-10-05 | End: 2023-10-05

## 2023-10-05 RX ORDER — LABETALOL HYDROCHLORIDE 5 MG/ML
INJECTION, SOLUTION INTRAVENOUS PRN
Status: COMPLETED | OUTPATIENT
Start: 2023-10-05 | End: 2023-10-05

## 2023-10-05 RX ORDER — AMLODIPINE BESYLATE 5 MG/1
5 TABLET ORAL DAILY
Status: ON HOLD | COMMUNITY

## 2023-10-05 RX ORDER — ENOXAPARIN SODIUM 300 MG/3ML
1 INJECTION INTRAVENOUS; SUBCUTANEOUS 2 TIMES DAILY
Status: ON HOLD | COMMUNITY

## 2023-10-05 RX ADMIN — HYDROMORPHONE HYDROCHLORIDE 1 MG: 1 INJECTION, SOLUTION INTRAMUSCULAR; INTRAVENOUS; SUBCUTANEOUS at 13:00

## 2023-10-05 RX ADMIN — HYDRALAZINE HYDROCHLORIDE 5 MG: 20 INJECTION INTRAMUSCULAR; INTRAVENOUS at 11:05

## 2023-10-05 RX ADMIN — LABETALOL HYDROCHLORIDE 5 MG: 5 INJECTION INTRAVENOUS at 13:13

## 2023-10-05 RX ADMIN — FENTANYL CITRATE 50 MCG: 50 INJECTION, SOLUTION INTRAMUSCULAR; INTRAVENOUS at 10:50

## 2023-10-05 RX ADMIN — HYDRALAZINE HYDROCHLORIDE 5 MG: 20 INJECTION INTRAMUSCULAR; INTRAVENOUS at 10:43

## 2023-10-05 RX ADMIN — FENTANYL CITRATE 25 MCG: 50 INJECTION, SOLUTION INTRAMUSCULAR; INTRAVENOUS at 10:41

## 2023-10-05 RX ADMIN — HYDRALAZINE HYDROCHLORIDE 5 MG: 20 INJECTION INTRAMUSCULAR; INTRAVENOUS at 11:12

## 2023-10-05 RX ADMIN — Medication 1 KIT: at 11:13

## 2023-10-05 RX ADMIN — HYDRALAZINE HYDROCHLORIDE 5 MG: 20 INJECTION INTRAMUSCULAR; INTRAVENOUS at 10:48

## 2023-10-05 RX ADMIN — AMLODIPINE BESYLATE 5 MG: 5 TABLET ORAL at 13:33

## 2023-10-05 RX ADMIN — LABETALOL HYDROCHLORIDE 10 MG: 5 INJECTION INTRAVENOUS at 10:51

## 2023-10-05 RX ADMIN — KETOROLAC TROMETHAMINE 30 MG: 30 INJECTION, SOLUTION INTRAMUSCULAR; INTRAVENOUS at 11:56

## 2023-10-05 RX ADMIN — MIDAZOLAM HYDROCHLORIDE 0.5 MG: 1 INJECTION, SOLUTION INTRAMUSCULAR; INTRAVENOUS at 10:40

## 2023-10-05 RX ADMIN — FENTANYL CITRATE 25 MCG: 50 INJECTION, SOLUTION INTRAMUSCULAR; INTRAVENOUS at 11:01

## 2023-10-05 RX ADMIN — DIPHENHYDRAMINE HYDROCHLORIDE 25 MG: 50 INJECTION, SOLUTION INTRAMUSCULAR; INTRAVENOUS at 10:42

## 2023-10-05 ASSESSMENT — PAIN SCALES - GENERAL
PAINLEVEL_OUTOF10: 4
PAINLEVEL_OUTOF10: 7
PAINLEVEL_OUTOF10: 7

## 2023-10-05 NOTE — PROCEDURES
1125Patient returned from renal biopsy, reported that surgiflow was used, pt may experience mild discomfort temporarily . Dressing checked, clean, dry, and intact. Patient stable. No s/s of complications noted or reported. C/o mild discomfort at present Vitals will be checked q 15min, see flow sheets. 1155 Dr Jaylan Gonzalez at bedside, pt reported pain 7/10, Dr Jaylan Gonzalez ordered one time dose of Toradol. Notified Dr Jaylan Gonzalez of pt CRT/GFR, ok to give one time dose. 1230 pt still complaint of 7/10, states pain is now radiating to RLQ of abd. Notfified Dr. Jaylan Gonzalez, pt will be getting CT scan    1245 left for CT    1300 return from CT, admin 1mg dilaudid per Dr. Kyle Lake order. Pt to remain on BR for 2 more hours, flat or lying right side        Patient eating and drinking well with no s/s of complications noted or reported. Patient discharged, site was checked with every set of vitals. Site clean dry and intact. Discharge papers reviewed with patient, questions answered, discharge paper signed. Patient taken to door via ***. Patient in stable condition, no s/s of complications noted or reported.

## 2023-10-05 NOTE — PROCEDURES
1225   Discussed pt continued discomfort right lower abdomen after medicated with Toradol and Dr Danyell Stone will re examine pt in CT scan. 1235   Dilaudid 1mg ordered.

## 2023-10-05 NOTE — BRIEF OP NOTE
Brief Postoperative Note      Patient: Leatha Cole  YOB: 1970  MRN: [id number]    Date of Procedure: 10/5/2023    Malignant hypertension    Post-Op Diagnosis: Same       CT renal biopsy    JAEL Verdugo    Assistant:  * No surgical staff found *    Anesthesia: * Moderate sedation*    Estimated Blood Loss (mL): Minimal    Complications: Bleeding    Specimens:   Kidney    Implants:  * No implants in log *      Drains: * No LDAs found *    Findings: Very hypertensive . In spite of many meds. Needs procedure.        Electronically signed by Radha Chowdhury MD on 10/5/2023 at 1:05 PM

## 2023-10-05 NOTE — PRE SEDATION
MD Breonna   Brivaracetam (BRIVIACT) 50 MG tablet Take 1 tablet by mouth 2 times daily. ProviderBreonna MD   fluticasone (FLONASE) 50 MCG/ACT nasal spray 2 sprays by Each Nostril route daily as needed for Rhinitis or Allergies    Breonna Pérez MD   predniSONE (DELTASONE) 5 MG tablet Take 1 tablet by mouth 2 times daily    Breonna Pérez MD   OXYGEN Inhale 2 L/min into the lungs nightly    Breonna Pérez MD   aspirin 81 MG EC tablet Take 1 tablet by mouth every morning    Breonna Pérez MD   vitamin D3 (CHOLECALCIFEROL) 125 MCG (5000 UT) TABS tablet Take 1 tablet by mouth every other day    Breonna Pérez MD     Coumadin Use Last 7 Days:  no  Antiplatelet drug therapy use last 7 days: no  Other anticoagulant use last 7 days: no  Additional Medication Information:  N/A      Pre-Sedation Documentation and Exam:   I have personally completed a history, physical exam & review of systems for this patient (see notes).     Mallampati Airway Assessment:  normal    Prior History of Anesthesia Complications:   none    ASA Classification:  Class 3 - A patient with severe systemic disease that limits activity but is not incapacitating    Sedation/ Anesthesia Plan:   intravenous sedation    Medications Planned:   fentanyl intravenously    Patient is an appropriate candidate for plan of sedation: yes    Electronically signed by Patricio Brennan MD on 10/5/2023 at 1:04 PM

## 2023-10-06 RX ORDER — KETOROLAC TROMETHAMINE 10 MG/1
10 TABLET, FILM COATED ORAL EVERY 6 HOURS PRN
Qty: 20 TABLET | Refills: 0 | Status: ON HOLD | OUTPATIENT
Start: 2023-10-06 | End: 2024-10-05

## 2023-10-08 ENCOUNTER — APPOINTMENT (OUTPATIENT)
Dept: CT IMAGING | Age: 53
DRG: 811 | End: 2023-10-08
Payer: COMMERCIAL

## 2023-10-08 ENCOUNTER — APPOINTMENT (OUTPATIENT)
Dept: ULTRASOUND IMAGING | Age: 53
DRG: 811 | End: 2023-10-08
Payer: COMMERCIAL

## 2023-10-08 ENCOUNTER — HOSPITAL ENCOUNTER (INPATIENT)
Age: 53
LOS: 5 days | Discharge: ANOTHER HOSPITAL | DRG: 811 | End: 2023-10-13
Attending: EMERGENCY MEDICINE
Payer: COMMERCIAL

## 2023-10-08 ENCOUNTER — APPOINTMENT (OUTPATIENT)
Dept: INTERVENTIONAL RADIOLOGY/VASCULAR | Age: 53
DRG: 811 | End: 2023-10-08
Payer: COMMERCIAL

## 2023-10-08 ENCOUNTER — APPOINTMENT (OUTPATIENT)
Dept: GENERAL RADIOLOGY | Age: 53
DRG: 811 | End: 2023-10-08
Payer: COMMERCIAL

## 2023-10-08 DIAGNOSIS — D62 ACUTE BLOOD LOSS ANEMIA: ICD-10-CM

## 2023-10-08 DIAGNOSIS — N17.9 AKI (ACUTE KIDNEY INJURY) (HCC): ICD-10-CM

## 2023-10-08 DIAGNOSIS — J81.0 ACUTE PULMONARY EDEMA (HCC): ICD-10-CM

## 2023-10-08 DIAGNOSIS — J96.01 ACUTE RESPIRATORY FAILURE WITH HYPOXIA (HCC): Primary | ICD-10-CM

## 2023-10-08 DIAGNOSIS — J18.9 PNEUMONIA OF BOTH LUNGS DUE TO INFECTIOUS ORGANISM, UNSPECIFIED PART OF LUNG: ICD-10-CM

## 2023-10-08 LAB
AADO2: 314 MMHG
AADO2: 530.1 MMHG
AADO2: 552.7 MMHG
ALBUMIN SERPL-MCNC: 2.8 G/DL (ref 3.5–5.2)
ALP SERPL-CCNC: 42 U/L (ref 40–129)
ALT SERPL-CCNC: 10 U/L (ref 0–40)
ANION GAP SERPL CALCULATED.3IONS-SCNC: 11 MMOL/L (ref 7–16)
ANION GAP SERPL CALCULATED.3IONS-SCNC: 14 MMOL/L (ref 7–16)
AST SERPL-CCNC: 17 U/L (ref 0–39)
B.E.: -7 MMOL/L (ref -3–3)
B.E.: -7 MMOL/L (ref -3–3)
B.E.: -7.4 MMOL/L (ref -3–3)
B.E.: -8.7 MMOL/L (ref -3–3)
BACTERIA URNS QL MICRO: ABNORMAL
BASOPHILS # BLD: 0 K/UL (ref 0–0.2)
BASOPHILS # BLD: 0.01 K/UL (ref 0–0.2)
BASOPHILS NFR BLD: 0 % (ref 0–2)
BASOPHILS NFR BLD: 0 % (ref 0–2)
BILIRUB SERPL-MCNC: 0.4 MG/DL (ref 0–1.2)
BILIRUB UR QL STRIP: NEGATIVE
BNP SERPL-MCNC: 4480 PG/ML (ref 0–125)
BUN SERPL-MCNC: 36 MG/DL (ref 6–20)
BUN SERPL-MCNC: 40 MG/DL (ref 6–20)
CALCIUM SERPL-MCNC: 7.5 MG/DL (ref 8.6–10.2)
CALCIUM SERPL-MCNC: 8.4 MG/DL (ref 8.6–10.2)
CHLORIDE SERPL-SCNC: 100 MMOL/L (ref 98–107)
CHLORIDE SERPL-SCNC: 102 MMOL/L (ref 98–107)
CLARITY UR: CLEAR
CLOT ANGLE.KAOLIN INDUCED BLD RES TEG: 63.1 DEG (ref 53–70)
CO2 SERPL-SCNC: 15 MMOL/L (ref 22–29)
CO2 SERPL-SCNC: 18 MMOL/L (ref 22–29)
COHB: 1.3 % (ref 0–1.5)
COHB: 1.5 % (ref 0–1.5)
COHB: 1.5 % (ref 0–1.5)
COHB: 1.6 % (ref 0–1.5)
COLOR UR: YELLOW
COMMENT: ABNORMAL
CREAT SERPL-MCNC: 2.1 MG/DL (ref 0.7–1.2)
CREAT SERPL-MCNC: 2.3 MG/DL (ref 0.7–1.2)
CREAT UR-MCNC: 97.9 MG/DL (ref 40–278)
CRITICAL: ABNORMAL
DATE ANALYZED: ABNORMAL
DATE OF COLLECTION: ABNORMAL
EOSINOPHIL # BLD: 0 K/UL (ref 0.05–0.5)
EOSINOPHIL # BLD: 0.02 K/UL (ref 0.05–0.5)
EOSINOPHILS RELATIVE PERCENT: 0 % (ref 0–6)
EOSINOPHILS RELATIVE PERCENT: 0 % (ref 0–6)
EPL-TEG: 0.1 % (ref 0–15)
ERYTHROCYTE [DISTWIDTH] IN BLOOD BY AUTOMATED COUNT: 19.8 % (ref 11.5–15)
ERYTHROCYTE [DISTWIDTH] IN BLOOD BY AUTOMATED COUNT: 19.9 % (ref 11.5–15)
FIO2: 100 %
FIO2: 100 %
FIO2: 60 %
FLUAV RNA RESP QL NAA+PROBE: NOT DETECTED
FLUBV RNA RESP QL NAA+PROBE: NOT DETECTED
G-TEG: 14.4 KDYN/CM2 (ref 4.5–11)
GFR SERPL CREATININE-BSD FRML MDRD: 33 ML/MIN/1.73M2
GFR SERPL CREATININE-BSD FRML MDRD: 36 ML/MIN/1.73M2
GLUCOSE SERPL-MCNC: 106 MG/DL (ref 74–99)
GLUCOSE SERPL-MCNC: 123 MG/DL (ref 74–99)
GLUCOSE UR STRIP-MCNC: NEGATIVE MG/DL
HCO3: 15.5 MMOL/L (ref 22–26)
HCO3: 17 MMOL/L (ref 22–26)
HCO3: 17.1 MMOL/L (ref 22–26)
HCO3: 17.5 MMOL/L (ref 22–26)
HCT VFR BLD AUTO: 16.9 % (ref 37–54)
HCT VFR BLD AUTO: 17.7 % (ref 37–54)
HCT VFR BLD AUTO: 17.8 % (ref 37–54)
HCT VFR BLD AUTO: 20.3 % (ref 37–54)
HCT VFR BLD AUTO: 21.1 % (ref 37–54)
HGB BLD-MCNC: 5.3 G/DL (ref 12.5–16.5)
HGB BLD-MCNC: 5.5 G/DL (ref 12.5–16.5)
HGB BLD-MCNC: 5.5 G/DL (ref 12.5–16.5)
HGB BLD-MCNC: 6.3 G/DL (ref 12.5–16.5)
HGB BLD-MCNC: 6.7 G/DL (ref 12.5–16.5)
HGB UR QL STRIP.AUTO: ABNORMAL
HHB: 1.8 % (ref 0–5)
HHB: 2.3 % (ref 0–5)
HHB: 2.9 % (ref 0–5)
HHB: 8.4 % (ref 0–5)
IMM GRANULOCYTES # BLD AUTO: 0.13 K/UL (ref 0–0.58)
IMM GRANULOCYTES NFR BLD: 2 % (ref 0–5)
INR PPP: 1.5
INR PPP: 1.7
INR PPP: 1.7
KETONES UR STRIP-MCNC: NEGATIVE MG/DL
KINETICS TEG: 1.9 MIN (ref 1–3)
LAB: ABNORMAL
LACTATE BLDV-SCNC: 1 MMOL/L (ref 0.5–1.9)
LACTATE BLDV-SCNC: 1.4 MMOL/L (ref 0.5–2.2)
LACTATE BLDV-SCNC: 1.5 MMOL/L (ref 0.5–2.2)
LEUKOCYTE ESTERASE UR QL STRIP: ABNORMAL
LY30 (LYSIS) TEG: 0.1 % (ref 0–8)
LYMPHOCYTES NFR BLD: 0.33 K/UL (ref 1.5–4)
LYMPHOCYTES NFR BLD: 0.79 K/UL (ref 1.5–4)
LYMPHOCYTES RELATIVE PERCENT: 12 % (ref 20–42)
LYMPHOCYTES RELATIVE PERCENT: 8 % (ref 20–42)
Lab: 1018
Lab: 1349
Lab: 2154
Lab: 2350
MA (MAX CLOT) TEG: 74.2 MM (ref 50–70)
MAGNESIUM SERPL-MCNC: 1.7 MG/DL (ref 1.6–2.6)
MCH RBC QN AUTO: 23.4 PG (ref 26–35)
MCH RBC QN AUTO: 24.4 PG (ref 26–35)
MCHC RBC AUTO-ENTMCNC: 30.9 G/DL (ref 32–34.5)
MCHC RBC AUTO-ENTMCNC: 31.1 G/DL (ref 32–34.5)
MCV RBC AUTO: 75.3 FL (ref 80–99.9)
MCV RBC AUTO: 79.1 FL (ref 80–99.9)
METAMYELOCYTES ABSOLUTE COUNT: 0.07 K/UL (ref 0–0.12)
METAMYELOCYTES: 2 % (ref 0–1)
METHB: 0.2 % (ref 0–1.5)
METHB: 0.5 % (ref 0–1.5)
METHB: 0.6 % (ref 0–1.5)
METHB: 0.8 % (ref 0–1.5)
MODE: ABNORMAL
MONOCYTES NFR BLD: 0.18 K/UL (ref 0.1–0.95)
MONOCYTES NFR BLD: 0.31 K/UL (ref 0.1–0.95)
MONOCYTES NFR BLD: 4 % (ref 2–12)
MONOCYTES NFR BLD: 5 % (ref 2–12)
NEGATIVE BASE EXCESS, ART: 6.4 MMOL/L
NEUTROPHILS NFR BLD: 82 % (ref 43–80)
NEUTROPHILS NFR BLD: 86 % (ref 43–80)
NEUTS SEG NFR BLD: 3.62 K/UL (ref 1.8–7.3)
NEUTS SEG NFR BLD: 5.57 K/UL (ref 1.8–7.3)
NITRITE UR QL STRIP: NEGATIVE
NUCLEATED RED BLOOD CELLS: 3 PER 100 WBC
O2 CONTENT: 10.3 ML/DL
O2 CONTENT: 10.6 ML/DL
O2 CONTENT: 7.8 ML/DL
O2 CONTENT: 8.4 ML/DL
O2 DELIVERY DEVICE: ABNORMAL
O2 SATURATION: 91.4 % (ref 92–98.5)
O2 SATURATION: 97 % (ref 92–98.5)
O2 SATURATION: 97.7 % (ref 92–98.5)
O2 SATURATION: 98.2 % (ref 92–98.5)
O2HB: 89.2 % (ref 94–97)
O2HB: 95.3 % (ref 94–97)
O2HB: 96 % (ref 94–97)
O2HB: 96.1 % (ref 94–97)
OPERATOR ID: 8214
OPERATOR ID: ABNORMAL
PARTIAL THROMBOPLASTIN TIME: 48.6 SEC (ref 24.5–35.1)
PARTIAL THROMBOPLASTIN TIME: 56.6 SEC (ref 24.5–35.1)
PATIENT TEMP: 37 C
PCO2: 27.1 MMHG (ref 35–45)
PCO2: 27.9 MMHG (ref 35–45)
PCO2: 29.3 MMHG (ref 35–45)
PCO2: 30.9 MMHG (ref 35–45)
PEEP/CPAP: 8 CMH2O
PEEP/CPAP: 8 CMH2O
PFO2: 1.08 MMHG/%
PFO2: 1.11 MMHG/%
PFO2: 1.27 MMHG/%
PH BLOOD GAS: 7.37 (ref 7.35–7.45)
PH BLOOD GAS: 7.38 (ref 7.35–7.45)
PH BLOOD GAS: 7.38 (ref 7.35–7.45)
PH BLOOD GAS: 7.4 (ref 7.35–7.45)
PH UR STRIP: 6 [PH] (ref 5–9)
PHOSPHATE SERPL-MCNC: 3.5 MG/DL (ref 2.5–4.5)
PIP: 12 CMH2O
PLATELET # BLD AUTO: 174 K/UL (ref 130–450)
PLATELET, FLUORESCENCE: 145 K/UL (ref 130–450)
PMV BLD AUTO: 10.2 FL (ref 7–12)
PMV BLD AUTO: 10.9 FL (ref 7–12)
PO2: 108.2 MMHG (ref 75–100)
PO2: 127 MMHG (ref 75–100)
PO2: 66.6 MMHG (ref 75–100)
PO2: 94.7 MMHG (ref 75–100)
POC HCO3: 17.5 MMOL/L (ref 22–26)
POC O2 SATURATION: 99.6 % (ref 92–98.5)
POC PCO2: 26.3 MM HG (ref 35–45)
POC PH: 7.43 (ref 7.35–7.45)
POC PO2: 174.8 MM HG (ref 80–100)
POTASSIUM SERPL-SCNC: 5.1 MMOL/L (ref 3.5–5)
POTASSIUM SERPL-SCNC: 5.2 MMOL/L (ref 3.5–5)
PROT SERPL-MCNC: 6 G/DL (ref 6.4–8.3)
PROT UR STRIP-MCNC: >=300 MG/DL
PROTHROMBIN TIME: 17 SEC (ref 9.3–12.4)
PROTHROMBIN TIME: 18.4 SEC (ref 9.3–12.4)
PROTHROMBIN TIME: 18.8 SEC (ref 9.3–12.4)
PS: 12 CMH20
RBC # BLD AUTO: 2.25 M/UL (ref 3.8–5.8)
RBC # BLD AUTO: 2.35 M/UL (ref 3.8–5.8)
RBC # BLD: ABNORMAL 10*6/UL
RBC #/AREA URNS HPF: ABNORMAL /HPF
REACTION TIME TEG: 7.3 MIN (ref 5–10)
RI(T): 4.17
RI(T): 4.71
RI(T): 5.11
SAMPLE SITE: ABNORMAL
SARS-COV-2 RNA RESP QL NAA+PROBE: NOT DETECTED
SODIUM SERPL-SCNC: 129 MMOL/L (ref 132–146)
SODIUM SERPL-SCNC: 131 MMOL/L (ref 132–146)
SOURCE, BLOOD GAS: ABNORMAL
SOURCE: NORMAL
SP GR UR STRIP: 1.02 (ref 1–1.03)
SPECIMEN DESCRIPTION: NORMAL
THB: 6.1 G/DL (ref 11.5–16.5)
THB: 6.1 G/DL (ref 11.5–16.5)
THB: 7.5 G/DL (ref 11.5–16.5)
THB: 7.6 G/DL (ref 11.5–16.5)
TIME ANALYZED: 1040
TIME ANALYZED: 1355
TIME ANALYZED: 2157
TIME ANALYZED: 2357
TROPONIN I SERPL HS-MCNC: 47 NG/L (ref 0–11)
TROPONIN I SERPL HS-MCNC: 52 NG/L (ref 0–11)
UROBILINOGEN UR STRIP-ACNC: 0.2 EU/DL (ref 0–1)
UUN UR-MCNC: 512 MG/DL (ref 800–1666)
WBC #/AREA URNS HPF: ABNORMAL /HPF
WBC OTHER # BLD: 4.2 K/UL (ref 4.5–11.5)
WBC OTHER # BLD: 6.8 K/UL (ref 4.5–11.5)

## 2023-10-08 PROCEDURE — 80053 COMPREHEN METABOLIC PANEL: CPT

## 2023-10-08 PROCEDURE — 37244 VASC EMBOLIZE/OCCLUDE BLEED: CPT

## 2023-10-08 PROCEDURE — 74176 CT ABD & PELVIS W/O CONTRAST: CPT

## 2023-10-08 PROCEDURE — 84540 ASSAY OF URINE/UREA-N: CPT

## 2023-10-08 PROCEDURE — 96376 TX/PRO/DX INJ SAME DRUG ADON: CPT

## 2023-10-08 PROCEDURE — 2500000003 HC RX 250 WO HCPCS: Performed by: INTERNAL MEDICINE

## 2023-10-08 PROCEDURE — 03HY32Z INSERTION OF MONITORING DEVICE INTO UPPER ARTERY, PERCUTANEOUS APPROACH: ICD-10-PCS

## 2023-10-08 PROCEDURE — 86923 COMPATIBILITY TEST ELECTRIC: CPT

## 2023-10-08 PROCEDURE — 0BH17EZ INSERTION OF ENDOTRACHEAL AIRWAY INTO TRACHEA, VIA NATURAL OR ARTIFICIAL OPENING: ICD-10-PCS

## 2023-10-08 PROCEDURE — 85390 FIBRINOLYSINS SCREEN I&R: CPT

## 2023-10-08 PROCEDURE — 81001 URINALYSIS AUTO W/SCOPE: CPT

## 2023-10-08 PROCEDURE — P9017 PLASMA 1 DONOR FRZ W/IN 8 HR: HCPCS

## 2023-10-08 PROCEDURE — 6370000000 HC RX 637 (ALT 250 FOR IP): Performed by: CLINICAL NURSE SPECIALIST

## 2023-10-08 PROCEDURE — 2500000003 HC RX 250 WO HCPCS

## 2023-10-08 PROCEDURE — 86850 RBC ANTIBODY SCREEN: CPT

## 2023-10-08 PROCEDURE — 6360000002 HC RX W HCPCS: Performed by: INTERNAL MEDICINE

## 2023-10-08 PROCEDURE — B41F1ZZ FLUOROSCOPY OF RIGHT LOWER EXTREMITY ARTERIES USING LOW OSMOLAR CONTRAST: ICD-10-PCS

## 2023-10-08 PROCEDURE — 82570 ASSAY OF URINE CREATININE: CPT

## 2023-10-08 PROCEDURE — 04V93DZ RESTRICTION OF RIGHT RENAL ARTERY WITH INTRALUMINAL DEVICE, PERCUTANEOUS APPROACH: ICD-10-PCS

## 2023-10-08 PROCEDURE — 96366 THER/PROPH/DIAG IV INF ADDON: CPT

## 2023-10-08 PROCEDURE — 96365 THER/PROPH/DIAG IV INF INIT: CPT

## 2023-10-08 PROCEDURE — 96367 TX/PROPH/DG ADDL SEQ IV INF: CPT

## 2023-10-08 PROCEDURE — 2580000003 HC RX 258

## 2023-10-08 PROCEDURE — 6360000002 HC RX W HCPCS: Performed by: RADIOLOGY

## 2023-10-08 PROCEDURE — B4161ZZ FLUOROSCOPY OF RIGHT RENAL ARTERY USING LOW OSMOLAR CONTRAST: ICD-10-PCS

## 2023-10-08 PROCEDURE — 6360000002 HC RX W HCPCS

## 2023-10-08 PROCEDURE — 2500000003 HC RX 250 WO HCPCS: Performed by: EMERGENCY MEDICINE

## 2023-10-08 PROCEDURE — 85014 HEMATOCRIT: CPT

## 2023-10-08 PROCEDURE — 87636 SARSCOV2 & INF A&B AMP PRB: CPT

## 2023-10-08 PROCEDURE — 85018 HEMOGLOBIN: CPT

## 2023-10-08 PROCEDURE — 6370000000 HC RX 637 (ALT 250 FOR IP)

## 2023-10-08 PROCEDURE — 6370000000 HC RX 637 (ALT 250 FOR IP): Performed by: RADIOLOGY

## 2023-10-08 PROCEDURE — 86920 COMPATIBILITY TEST SPIN: CPT

## 2023-10-08 PROCEDURE — 85610 PROTHROMBIN TIME: CPT

## 2023-10-08 PROCEDURE — 6360000002 HC RX W HCPCS: Performed by: EMERGENCY MEDICINE

## 2023-10-08 PROCEDURE — 2580000003 HC RX 258: Performed by: STUDENT IN AN ORGANIZED HEALTH CARE EDUCATION/TRAINING PROGRAM

## 2023-10-08 PROCEDURE — 99285 EMERGENCY DEPT VISIT HI MDM: CPT

## 2023-10-08 PROCEDURE — 2500000003 HC RX 250 WO HCPCS: Performed by: RADIOLOGY

## 2023-10-08 PROCEDURE — 86900 BLOOD TYPING SEROLOGIC ABO: CPT

## 2023-10-08 PROCEDURE — 93005 ELECTROCARDIOGRAM TRACING: CPT | Performed by: EMERGENCY MEDICINE

## 2023-10-08 PROCEDURE — 99291 CRITICAL CARE FIRST HOUR: CPT | Performed by: INTERNAL MEDICINE

## 2023-10-08 PROCEDURE — 6360000004 HC RX CONTRAST MEDICATION: Performed by: RADIOLOGY

## 2023-10-08 PROCEDURE — 82803 BLOOD GASES ANY COMBINATION: CPT

## 2023-10-08 PROCEDURE — P9016 RBC LEUKOCYTES REDUCED: HCPCS

## 2023-10-08 PROCEDURE — 71250 CT THORAX DX C-: CPT

## 2023-10-08 PROCEDURE — 84100 ASSAY OF PHOSPHORUS: CPT

## 2023-10-08 PROCEDURE — 36430 TRANSFUSION BLD/BLD COMPNT: CPT

## 2023-10-08 PROCEDURE — 83735 ASSAY OF MAGNESIUM: CPT

## 2023-10-08 PROCEDURE — 80048 BASIC METABOLIC PNL TOTAL CA: CPT

## 2023-10-08 PROCEDURE — 85347 COAGULATION TIME ACTIVATED: CPT

## 2023-10-08 PROCEDURE — 96375 TX/PRO/DX INJ NEW DRUG ADDON: CPT

## 2023-10-08 PROCEDURE — 71045 X-RAY EXAM CHEST 1 VIEW: CPT

## 2023-10-08 PROCEDURE — 2000000000 HC ICU R&B

## 2023-10-08 PROCEDURE — C1889 IMPLANT/INSERT DEVICE, NOC: HCPCS

## 2023-10-08 PROCEDURE — 83605 ASSAY OF LACTIC ACID: CPT

## 2023-10-08 PROCEDURE — 83880 ASSAY OF NATRIURETIC PEPTIDE: CPT

## 2023-10-08 PROCEDURE — 2580000003 HC RX 258: Performed by: EMERGENCY MEDICINE

## 2023-10-08 PROCEDURE — 85576 BLOOD PLATELET AGGREGATION: CPT

## 2023-10-08 PROCEDURE — 84484 ASSAY OF TROPONIN QUANT: CPT

## 2023-10-08 PROCEDURE — 0B9G8ZX DRAINAGE OF LEFT UPPER LUNG LOBE, VIA NATURAL OR ARTIFICIAL OPENING ENDOSCOPIC, DIAGNOSTIC: ICD-10-PCS

## 2023-10-08 PROCEDURE — 86901 BLOOD TYPING SEROLOGIC RH(D): CPT

## 2023-10-08 PROCEDURE — 0TB03ZX EXCISION OF RIGHT KIDNEY, PERCUTANEOUS APPROACH, DIAGNOSTIC: ICD-10-PCS

## 2023-10-08 PROCEDURE — 6360000002 HC RX W HCPCS: Performed by: STUDENT IN AN ORGANIZED HEALTH CARE EDUCATION/TRAINING PROGRAM

## 2023-10-08 PROCEDURE — 36253 INS CATH REN ART 2ND+ UNILAT: CPT

## 2023-10-08 PROCEDURE — 5A1935Z RESPIRATORY VENTILATION, LESS THAN 24 CONSECUTIVE HOURS: ICD-10-PCS

## 2023-10-08 PROCEDURE — 94660 CPAP INITIATION&MGMT: CPT

## 2023-10-08 PROCEDURE — 85384 FIBRINOGEN ACTIVITY: CPT

## 2023-10-08 PROCEDURE — 85730 THROMBOPLASTIN TIME PARTIAL: CPT

## 2023-10-08 PROCEDURE — 82805 BLOOD GASES W/O2 SATURATION: CPT

## 2023-10-08 PROCEDURE — C1769 GUIDE WIRE: HCPCS

## 2023-10-08 PROCEDURE — 99292 CRITICAL CARE ADDL 30 MIN: CPT | Performed by: INTERNAL MEDICINE

## 2023-10-08 PROCEDURE — 86927 PLASMA FRESH FROZEN: CPT

## 2023-10-08 PROCEDURE — 36620 INSERTION CATHETER ARTERY: CPT | Performed by: INTERNAL MEDICINE

## 2023-10-08 PROCEDURE — 87040 BLOOD CULTURE FOR BACTERIA: CPT

## 2023-10-08 PROCEDURE — 93005 ELECTROCARDIOGRAM TRACING: CPT | Performed by: FAMILY MEDICINE

## 2023-10-08 PROCEDURE — 85025 COMPLETE CBC W/AUTO DIFF WBC: CPT

## 2023-10-08 PROCEDURE — 2580000003 HC RX 258: Performed by: INTERNAL MEDICINE

## 2023-10-08 RX ORDER — ACETAMINOPHEN 650 MG/1
650 SUPPOSITORY RECTAL ONCE
Status: COMPLETED | OUTPATIENT
Start: 2023-10-08 | End: 2023-10-08

## 2023-10-08 RX ORDER — 0.9 % SODIUM CHLORIDE 0.9 %
500 INTRAVENOUS SOLUTION INTRAVENOUS ONCE
Status: COMPLETED | OUTPATIENT
Start: 2023-10-08 | End: 2023-10-08

## 2023-10-08 RX ORDER — HYDRALAZINE HYDROCHLORIDE 20 MG/ML
10 INJECTION INTRAMUSCULAR; INTRAVENOUS EVERY 4 HOURS PRN
Status: DISCONTINUED | OUTPATIENT
Start: 2023-10-08 | End: 2023-10-12

## 2023-10-08 RX ORDER — LABETALOL HYDROCHLORIDE 5 MG/ML
INJECTION, SOLUTION INTRAVENOUS PRN
Status: COMPLETED | OUTPATIENT
Start: 2023-10-08 | End: 2023-10-08

## 2023-10-08 RX ORDER — SODIUM CHLORIDE 9 MG/ML
INJECTION, SOLUTION INTRAVENOUS PRN
Status: DISCONTINUED | OUTPATIENT
Start: 2023-10-08 | End: 2023-10-09

## 2023-10-08 RX ORDER — ACETAMINOPHEN 650 MG/1
650 SUPPOSITORY RECTAL ONCE
Status: DISCONTINUED | OUTPATIENT
Start: 2023-10-08 | End: 2023-10-11

## 2023-10-08 RX ORDER — ACETAMINOPHEN 325 MG/1
650 TABLET ORAL EVERY 6 HOURS PRN
Status: DISCONTINUED | OUTPATIENT
Start: 2023-10-08 | End: 2023-10-13 | Stop reason: HOSPADM

## 2023-10-08 RX ORDER — MIDAZOLAM HYDROCHLORIDE 2 MG/2ML
INJECTION, SOLUTION INTRAMUSCULAR; INTRAVENOUS PRN
Status: COMPLETED | OUTPATIENT
Start: 2023-10-08 | End: 2023-10-08

## 2023-10-08 RX ORDER — ACETAMINOPHEN 500 MG
1000 TABLET ORAL ONCE
Status: DISCONTINUED | OUTPATIENT
Start: 2023-10-08 | End: 2023-10-08

## 2023-10-08 RX ORDER — LABETALOL HYDROCHLORIDE 5 MG/ML
10 INJECTION, SOLUTION INTRAVENOUS ONCE
Status: COMPLETED | OUTPATIENT
Start: 2023-10-08 | End: 2023-10-08

## 2023-10-08 RX ORDER — ALBUTEROL SULFATE 2.5 MG/3ML
2.5 SOLUTION RESPIRATORY (INHALATION)
Status: DISCONTINUED | OUTPATIENT
Start: 2023-10-08 | End: 2023-10-13 | Stop reason: HOSPADM

## 2023-10-08 RX ORDER — AMLODIPINE BESYLATE 5 MG/1
5 TABLET ORAL DAILY
Status: DISCONTINUED | OUTPATIENT
Start: 2023-10-08 | End: 2023-10-13 | Stop reason: HOSPADM

## 2023-10-08 RX ORDER — ACETAMINOPHEN 650 MG/1
650 SUPPOSITORY RECTAL EVERY 6 HOURS PRN
Status: DISCONTINUED | OUTPATIENT
Start: 2023-10-08 | End: 2023-10-13 | Stop reason: HOSPADM

## 2023-10-08 RX ORDER — FENTANYL CITRATE 50 UG/ML
INJECTION, SOLUTION INTRAMUSCULAR; INTRAVENOUS PRN
Status: COMPLETED | OUTPATIENT
Start: 2023-10-08 | End: 2023-10-08

## 2023-10-08 RX ORDER — SODIUM CHLORIDE 0.9 % (FLUSH) 0.9 %
5-40 SYRINGE (ML) INJECTION PRN
Status: DISCONTINUED | OUTPATIENT
Start: 2023-10-08 | End: 2023-10-13 | Stop reason: HOSPADM

## 2023-10-08 RX ORDER — LORAZEPAM 2 MG/ML
INJECTION INTRAMUSCULAR
Status: COMPLETED
Start: 2023-10-08 | End: 2023-10-08

## 2023-10-08 RX ORDER — ACETAMINOPHEN 325 MG/1
650 TABLET ORAL EVERY 4 HOURS PRN
Status: DISCONTINUED | OUTPATIENT
Start: 2023-10-08 | End: 2023-10-08 | Stop reason: SDUPTHER

## 2023-10-08 RX ORDER — LORAZEPAM 2 MG/ML
1 INJECTION INTRAMUSCULAR ONCE
Status: COMPLETED | OUTPATIENT
Start: 2023-10-08 | End: 2023-10-08

## 2023-10-08 RX ORDER — METOPROLOL SUCCINATE 50 MG/1
100 TABLET, EXTENDED RELEASE ORAL DAILY
Status: DISCONTINUED | OUTPATIENT
Start: 2023-10-08 | End: 2023-10-13 | Stop reason: HOSPADM

## 2023-10-08 RX ORDER — FENTANYL CITRATE 50 UG/ML
50 INJECTION, SOLUTION INTRAMUSCULAR; INTRAVENOUS ONCE
Status: COMPLETED | OUTPATIENT
Start: 2023-10-08 | End: 2023-10-08

## 2023-10-08 RX ORDER — LABETALOL HYDROCHLORIDE 5 MG/ML
INJECTION, SOLUTION INTRAVENOUS
Status: COMPLETED
Start: 2023-10-08 | End: 2023-10-08

## 2023-10-08 RX ORDER — IPRATROPIUM BROMIDE AND ALBUTEROL SULFATE 2.5; .5 MG/3ML; MG/3ML
3 SOLUTION RESPIRATORY (INHALATION) ONCE
Status: SHIPPED | OUTPATIENT
Start: 2023-10-08

## 2023-10-08 RX ORDER — LIDOCAINE HYDROCHLORIDE 20 MG/ML
INJECTION, SOLUTION INFILTRATION; PERINEURAL PRN
Status: COMPLETED | OUTPATIENT
Start: 2023-10-08 | End: 2023-10-08

## 2023-10-08 RX ORDER — LABETALOL HYDROCHLORIDE 5 MG/ML
10 INJECTION, SOLUTION INTRAVENOUS EVERY 6 HOURS
Status: DISCONTINUED | OUTPATIENT
Start: 2023-10-08 | End: 2023-10-12

## 2023-10-08 RX ORDER — SODIUM CHLORIDE 9 MG/ML
INJECTION, SOLUTION INTRAVENOUS PRN
Status: DISCONTINUED | OUTPATIENT
Start: 2023-10-08 | End: 2023-10-12

## 2023-10-08 RX ORDER — LIDOCAINE HYDROCHLORIDE AND EPINEPHRINE BITARTRATE 20; .01 MG/ML; MG/ML
INJECTION, SOLUTION SUBCUTANEOUS PRN
Status: COMPLETED | OUTPATIENT
Start: 2023-10-08 | End: 2023-10-08

## 2023-10-08 RX ORDER — LIDOCAINE HYDROCHLORIDE 10 MG/ML
INJECTION, SOLUTION INFILTRATION; PERINEURAL
Status: COMPLETED
Start: 2023-10-08 | End: 2023-10-08

## 2023-10-08 RX ORDER — HEPARIN SODIUM 10000 [USP'U]/ML
INJECTION, SOLUTION INTRAVENOUS; SUBCUTANEOUS PRN
Status: COMPLETED | OUTPATIENT
Start: 2023-10-08 | End: 2023-10-08

## 2023-10-08 RX ORDER — ONDANSETRON 4 MG/1
4 TABLET, ORALLY DISINTEGRATING ORAL EVERY 8 HOURS PRN
Status: DISCONTINUED | OUTPATIENT
Start: 2023-10-08 | End: 2023-10-13 | Stop reason: HOSPADM

## 2023-10-08 RX ORDER — ONDANSETRON 2 MG/ML
4 INJECTION INTRAMUSCULAR; INTRAVENOUS EVERY 6 HOURS PRN
Status: DISCONTINUED | OUTPATIENT
Start: 2023-10-08 | End: 2023-10-13 | Stop reason: HOSPADM

## 2023-10-08 RX ORDER — SODIUM CHLORIDE 0.9 % (FLUSH) 0.9 %
5-40 SYRINGE (ML) INJECTION EVERY 12 HOURS SCHEDULED
Status: DISCONTINUED | OUTPATIENT
Start: 2023-10-08 | End: 2023-10-13 | Stop reason: HOSPADM

## 2023-10-08 RX ORDER — METOPROLOL SUCCINATE 50 MG/1
100 TABLET, EXTENDED RELEASE ORAL EVERY MORNING
Status: DISCONTINUED | OUTPATIENT
Start: 2023-10-09 | End: 2023-10-08 | Stop reason: SDUPTHER

## 2023-10-08 RX ORDER — FUROSEMIDE 10 MG/ML
40 INJECTION INTRAMUSCULAR; INTRAVENOUS ONCE
Status: COMPLETED | OUTPATIENT
Start: 2023-10-08 | End: 2023-10-08

## 2023-10-08 RX ORDER — POLYETHYLENE GLYCOL 3350 17 G/17G
17 POWDER, FOR SOLUTION ORAL DAILY PRN
Status: DISCONTINUED | OUTPATIENT
Start: 2023-10-08 | End: 2023-10-12

## 2023-10-08 RX ADMIN — Medication 5000 UNITS: at 12:35

## 2023-10-08 RX ADMIN — SODIUM CHLORIDE 5 MG/HR: 9 INJECTION, SOLUTION INTRAVENOUS at 15:14

## 2023-10-08 RX ADMIN — LABETALOL HYDROCHLORIDE 10 MG: 5 INJECTION, SOLUTION INTRAVENOUS at 14:15

## 2023-10-08 RX ADMIN — PROTAMINE SULFATE 50 MG: 10 INJECTION, SOLUTION INTRAVENOUS at 10:57

## 2023-10-08 RX ADMIN — HYDROMORPHONE HYDROCHLORIDE 0.25 MG: 1 INJECTION, SOLUTION INTRAMUSCULAR; INTRAVENOUS; SUBCUTANEOUS at 15:19

## 2023-10-08 RX ADMIN — DEXMEDETOMIDINE 0.2 MCG/KG/HR: 100 INJECTION, SOLUTION INTRAVENOUS at 16:21

## 2023-10-08 RX ADMIN — PHYTONADIONE 10 MG: 10 INJECTION, EMULSION INTRAMUSCULAR; INTRAVENOUS; SUBCUTANEOUS at 16:18

## 2023-10-08 RX ADMIN — FENTANYL CITRATE 50 MCG: 50 INJECTION INTRAMUSCULAR; INTRAVENOUS at 07:57

## 2023-10-08 RX ADMIN — LABETALOL HYDROCHLORIDE 5 MG: 5 INJECTION INTRAVENOUS at 13:01

## 2023-10-08 RX ADMIN — FENTANYL CITRATE 25 MCG: 50 INJECTION, SOLUTION INTRAMUSCULAR; INTRAVENOUS at 11:44

## 2023-10-08 RX ADMIN — LABETALOL HYDROCHLORIDE 10 MG: 5 INJECTION INTRAVENOUS at 14:15

## 2023-10-08 RX ADMIN — LIDOCAINE HYDROCHLORIDE 10 ML: 20 INJECTION, SOLUTION INFILTRATION; PERINEURAL at 11:52

## 2023-10-08 RX ADMIN — FENTANYL CITRATE 25 MCG: 50 INJECTION, SOLUTION INTRAMUSCULAR; INTRAVENOUS at 11:48

## 2023-10-08 RX ADMIN — METOPROLOL SUCCINATE 100 MG: 50 TABLET, EXTENDED RELEASE ORAL at 14:28

## 2023-10-08 RX ADMIN — MIDAZOLAM HYDROCHLORIDE 0.5 MG: 1 INJECTION, SOLUTION INTRAMUSCULAR; INTRAVENOUS at 12:00

## 2023-10-08 RX ADMIN — SODIUM CHLORIDE 500 ML: 9 INJECTION, SOLUTION INTRAVENOUS at 08:03

## 2023-10-08 RX ADMIN — AMLODIPINE BESYLATE 5 MG: 5 TABLET ORAL at 14:28

## 2023-10-08 RX ADMIN — LABETALOL HYDROCHLORIDE 10 MG: 5 INJECTION INTRAVENOUS at 11:45

## 2023-10-08 RX ADMIN — ACETAMINOPHEN 650 MG: 325 TABLET ORAL at 21:10

## 2023-10-08 RX ADMIN — LIDOCAINE HYDROCHLORIDE,EPINEPHRINE BITARTRATE 10 ML: 20; .01 INJECTION, SOLUTION INFILTRATION; PERINEURAL at 11:46

## 2023-10-08 RX ADMIN — IOPAMIDOL 50 ML: 755 INJECTION, SOLUTION INTRAVENOUS at 12:58

## 2023-10-08 RX ADMIN — FENTANYL CITRATE 25 MCG: 50 INJECTION, SOLUTION INTRAMUSCULAR; INTRAVENOUS at 11:55

## 2023-10-08 RX ADMIN — VANCOMYCIN HYDROCHLORIDE 1500 MG: 1 INJECTION, POWDER, LYOPHILIZED, FOR SOLUTION INTRAVENOUS at 09:55

## 2023-10-08 RX ADMIN — Medication 5000 UNITS: at 12:36

## 2023-10-08 RX ADMIN — LORAZEPAM 1 MG: 2 INJECTION INTRAMUSCULAR; INTRAVENOUS at 14:20

## 2023-10-08 RX ADMIN — LABETALOL HYDROCHLORIDE 5 MG: 5 INJECTION INTRAVENOUS at 12:32

## 2023-10-08 RX ADMIN — SODIUM CHLORIDE 1000 MG: 9 INJECTION, SOLUTION INTRAVENOUS at 08:25

## 2023-10-08 RX ADMIN — MIDAZOLAM HYDROCHLORIDE 1 MG: 1 INJECTION, SOLUTION INTRAMUSCULAR; INTRAVENOUS at 12:50

## 2023-10-08 RX ADMIN — MIDAZOLAM HYDROCHLORIDE 0.5 MG: 1 INJECTION, SOLUTION INTRAMUSCULAR; INTRAVENOUS at 11:48

## 2023-10-08 RX ADMIN — FUROSEMIDE 40 MG: 10 INJECTION, SOLUTION INTRAMUSCULAR; INTRAVENOUS at 18:01

## 2023-10-08 RX ADMIN — LABETALOL HYDROCHLORIDE 10 MG: 5 INJECTION INTRAVENOUS at 14:23

## 2023-10-08 RX ADMIN — ACETAMINOPHEN 650 MG: 650 SUPPOSITORY RECTAL at 09:44

## 2023-10-08 RX ADMIN — FENTANYL CITRATE 25 MCG: 50 INJECTION, SOLUTION INTRAMUSCULAR; INTRAVENOUS at 12:00

## 2023-10-08 RX ADMIN — LIDOCAINE HYDROCHLORIDE: 10 INJECTION, SOLUTION INFILTRATION; PERINEURAL at 18:08

## 2023-10-08 RX ADMIN — CEFEPIME 2000 MG: 2 INJECTION, POWDER, FOR SOLUTION INTRAVENOUS at 06:55

## 2023-10-08 RX ADMIN — LORAZEPAM 1 MG: 2 INJECTION INTRAMUSCULAR at 14:20

## 2023-10-08 RX ADMIN — LABETALOL HYDROCHLORIDE 10 MG: 5 INJECTION INTRAVENOUS at 11:48

## 2023-10-08 RX ADMIN — MIDAZOLAM HYDROCHLORIDE 0.5 MG: 1 INJECTION, SOLUTION INTRAMUSCULAR; INTRAVENOUS at 11:55

## 2023-10-08 RX ADMIN — Medication 5000 UNITS: at 16:36

## 2023-10-08 RX ADMIN — MIDAZOLAM HYDROCHLORIDE 0.5 MG: 1 INJECTION, SOLUTION INTRAMUSCULAR; INTRAVENOUS at 11:45

## 2023-10-08 RX ADMIN — GELATIN ABSORBABLE SPONGE SIZE 100 1 EACH: MISC at 12:34

## 2023-10-08 RX ADMIN — ACETAMINOPHEN 650 MG: 325 TABLET ORAL at 16:37

## 2023-10-08 ASSESSMENT — PAIN DESCRIPTION - LOCATION
LOCATION: SHOULDER
LOCATION: SHOULDER

## 2023-10-08 ASSESSMENT — PAIN DESCRIPTION - DESCRIPTORS
DESCRIPTORS: THROBBING
DESCRIPTORS: DISCOMFORT

## 2023-10-08 ASSESSMENT — PAIN DESCRIPTION - FREQUENCY
FREQUENCY: CONTINUOUS
FREQUENCY: CONTINUOUS

## 2023-10-08 ASSESSMENT — PAIN DESCRIPTION - ONSET
ONSET: ON-GOING
ONSET: ON-GOING

## 2023-10-08 ASSESSMENT — PAIN DESCRIPTION - ORIENTATION
ORIENTATION: RIGHT
ORIENTATION: RIGHT

## 2023-10-08 ASSESSMENT — ENCOUNTER SYMPTOMS
VOMITING: 0
ABDOMINAL PAIN: 0
SHORTNESS OF BREATH: 1
NAUSEA: 0
EYE REDNESS: 0

## 2023-10-08 ASSESSMENT — PAIN - FUNCTIONAL ASSESSMENT
PAIN_FUNCTIONAL_ASSESSMENT: 0-10
PAIN_FUNCTIONAL_ASSESSMENT: NONE - DENIES PAIN

## 2023-10-08 ASSESSMENT — PAIN SCALES - GENERAL
PAINLEVEL_OUTOF10: 8
PAINLEVEL_OUTOF10: 5
PAINLEVEL_OUTOF10: 5
PAINLEVEL_OUTOF10: 8

## 2023-10-08 ASSESSMENT — PAIN DESCRIPTION - PAIN TYPE: TYPE: ACUTE PAIN

## 2023-10-08 NOTE — PROGRESS NOTES
Attempted the ultrasound of BLE for the second time today. Pt is S/P IR embolization and states he is unable to lay on his back for the test. Per Dr. Ashok Johnson, [address] [facility] to do 10/9.

## 2023-10-08 NOTE — PROGRESS NOTES
Attending Attestation Note:    I have personally seen and examined the patient in the ICU on 10/08/23. I discussed the case in detail with the resident, nursing and respiratory therapist as needed. I reviewed the pertinent laboratory studies, imaging studies, and records. Key elements of the encounter were performed by me (> 85 % time). Family is updated at the bedside as available. Key issues of the case were discussed among consultants. Past medical history, surgical history, family history, and social history are unchanged unless stated in the history of present illness. I have reviewed the patient's medications and allergies. Please see separate resident note. Acute right perinephric hematoma  Acute blood loss anemia  Anticoagulated on Lovenox/Coumadin    Acute hypoxic respiratory failure  HFpEF  DEMETRIUS not on CPAP    Patient received protamine. S/p IR embolization. Receiving blood products. Receiving IV vitamin K and FFP. Airvo high flow oxygen supplementation to maintain sats above 90%. This patient is unstable and critically ill with increased risk of clinical deterioration. There are life and organ supporting interventions that require frequent monitoring and personal assessment. There is a high possibility of sudden, clinically significant or life-threatening deterioration in this patient's condition which may require prompt therapeutic interventions. I spent 110 independent minutes of critical care time excluding procedures.     Blossom Robles MD MS  Pulmonary & [facility] [place]

## 2023-10-08 NOTE — ED NOTES
Nurse to nurse report called to [facility] ED.       Tk De Luna RN  10/08/23 1464

## 2023-10-08 NOTE — ED NOTES
Patient arrived from [facility] ED. Placed on BIPP on arrival.  Dr. Jessica Resendiz at bedside preparing for TLC. 2 units Trauma blood called for. Blood Bank notified.      Shelby Rabago RN  10/08/23 8604

## 2023-10-08 NOTE — ED NOTES
Dr. Rand Cadena states ok for patient to be transported on NRB/15L if needed.      Ty Anderson RN  10/08/23 3599

## 2023-10-08 NOTE — ED NOTES
PAS adjusting their bipap for patient. Patient not tolerating well. Attempting to adjust for patient comfort for transport.      Shelia Moya RN  10/08/23 5497

## 2023-10-08 NOTE — PROGRESS NOTES
Radiology Procedure Waiver   Name: Princess Robert  : 1970  MRN: [id number]    Date:  10/8/23    Time: 7:07 AM EDT    Benefits of immediately proceeding with Radiology exam(s) without pre-testing outweigh the risks or are not indicated as specified below and therefore the following is/are being waived:    [] Pregnancy test   [] Patients LMP on-time and regular.   [] Patient had Tubal Ligation or has other Contraception Device. [] Patient  is Menopausal or Premenarcheal.    [] Patient had Full or Partial Hysterectomy. [] Protocol for Iodine allergy    [] MRI Questionnaire     [x] BUN/Creatinine   [] Patient age w/no hx of renal dysfunction. [] Patient on Dialysis. [] Recent Normal Labs.   Electronically signed by Tracy Wadsworth DO on 10/8/23 at 7:07 AM EDT

## 2023-10-08 NOTE — ED NOTES
Pulse ox on room air upon admission 75%; O2 applied at 15liters/nonrebreather mask and pulse ox increased to 96%     Franklin Snowden RN  10/08/23 2412

## 2023-10-08 NOTE — ED PROVIDER NOTES
[address]        Pt Name: Inge Yeh  MRN: [id number]  [address] 1970  Date of evaluation: 10/8/2023  Provider: Alethea Mosley DO  PCP: Isabel Barrow DO  Note Started: 11:21 AM EDT 10/8/23    CHIEF COMPLAINT       Chief Complaint   Patient presents with    Shortness of Breath     Had kidney biopsy on Thurs because of Antiphospholipd syndrome and since that evening c/o shortness of breath , worse when lie flat        HISTORY OF PRESENT ILLNESS: 1 or more Elements   History From: Patient and EMS    Limitations to history : None    Inge Yeh is a 52 y.o. male who presents to the emergency department with chief complaint of generalized weakness and shortness of breath. Patient states that this been gradually worsening since Thursday. Patient states that he had a right kidney biopsy on Thursday with interventional radiology and since that time he has been having gradually worsening shortness of breath. He was seen at [facility] emergency department was found to be anemic and have a retroperitoneal hematoma with concern for bleeding after kidney biopsy. Patient states that he is generally just feeling quite weak and also having persistent shortness of breath that is worse when he lays flat. Patient denies any pain. Patient does state that he was instructed not to take his anticoagulation medication however he gave himself a dose of Lovenox and Coumadin yesterday. Patient denies any headache or dizziness, fever or chills, nausea or vomiting, chest pain, abdominal pain, hematuria or dysuria, constipation or diarrhea. Nursing Notes were all reviewed and agreed with or any disagreements were addressed in the HPI. REVIEW OF SYSTEMS :      Positives and Pertinent negatives as per HPI.      PAST MEDICAL HISTORY/Chronic Conditions Affecting Care      has a past medical history of Clotting disorder ([address]), DVT (deep venous thrombosis) ([address]), Hypertension, Hemoglobin 5.5 (*)     Hematocrit 17.7 (*)     MCV 75.3 (*)     MCH 23.4 (*)     MCHC 31.1 (*)     RDW 19.8 (*)     Neutrophils % 82 (*)     Lymphocytes % 12 (*)     Lymphocytes Absolute 0.79 (*)     Eosinophils Absolute 0.02 (*)     All other components within normal limits   COMPREHENSIVE METABOLIC PANEL - Abnormal; Notable for the following components:    Sodium 129 (*)     Potassium 5.2 (*)     CO2 18 (*)     Glucose 123 (*)     BUN 40 (*)     Creatinine 2.3 (*)     Est, Glom Filt Rate 33 (*)     Calcium 8.4 (*)     Total Protein 6.0 (*)     Albumin 2.8 (*)     All other components within normal limits   TROPONIN - Abnormal; Notable for the following components:    Troponin, High Sensitivity 47 (*)     All other components within normal limits   BRAIN NATRIURETIC PEPTIDE - Abnormal; Notable for the following components:    Pro-BNP 4,480 (*)     All other components within normal limits   PROTIME-INR - Abnormal; Notable for the following components:    Protime 17.0 (*)     All other components within normal limits   APTT - Abnormal; Notable for the following components:    PTT 56.6 (*)     All other components within normal limits   BLOOD GAS, ARTERIAL - Abnormal; Notable for the following components:    PCO2 27.9 (*)     HCO3 17.1 (*)     B.E. -7.0 (*)     tHb (est) 6.1 (*)     All other components within normal limits   TROPONIN - Abnormal; Notable for the following components:    Troponin, High Sensitivity 52 (*)     All other components within normal limits   HEMOGLOBIN AND HEMATOCRIT - Abnormal; Notable for the following components:    Hemoglobin 5.3 (*)     Hematocrit 16.9 (*)     All other components within normal limits   BLOOD GAS, ARTERIAL - Abnormal; Notable for the following components:    PCO2 29.3 (*)     PO2 66.6 (*)     HCO3 17.0 (*)     B.E. -7.4 (*)     O2 Sat 91.4 (*)     O2Hb 89.2 (*)     COHb 1.6 (*)     HHb 8.4 (*)     tHb (est) 6.1 (*)     All other components within normal limits   CBC

## 2023-10-08 NOTE — ED PROVIDER NOTES
[address] ENCOUNTER        Pt Name: Regan Thornton  MRN: [id number]  [address] 1970  Date of evaluation: 10/8/2023  Provider: Chad Moore DO  PCP: Ansley Guadarrama DO  Note Started: 6:14 AM EDT 10/8/23    CHIEF COMPLAINT       Chief Complaint   Patient presents with    Shortness of Breath     Had kidney biopsy on Thurs because of Antiphospholipd syndrome and since that evening c/o shortness of breath , worse when lie flat        HISTORY OF PRESENT ILLNESS: 1 or more Elements       Regan Thornton is a 52 y.o. male who presents to the emergency department with a chief complaint of shortness of breath. The history is obtained from the patient as well as the patient's medical record. 3 days ago the patient did have a kidney biopsy and evaluation for lupus. The patient states after the biopsy he became short of breath. This is worse with exertion as well as lying flat. He does have 2 L oxygen to be used as needed at night. He was found to be hypoxic 75% on room air. The patient states he did have mild amount of pain after the biopsy but this has since improved. Symptoms are moderate in severity. Nothing makes them better. Nothing makes them worse. The patient did have supplemental oxygen applied by EMS. He did arrive on 15 L nonrebreather. The patient does have a history of DVT. He does take Coumadin. He states he is off Coumadin for the procedure but was on Lovenox. He states that he did take 73 mg of Lovenox last night and 5 mg of Coumadin. Nursing Notes were all reviewed and agreed with or any disagreements were addressed in the HPI. REVIEW OF SYSTEMS :      Review of Systems   Constitutional:  Negative for fever. HENT:  Negative for congestion. Eyes:  Negative for redness. Respiratory:  Positive for shortness of breath. Cardiovascular:  Negative for chest pain.    Gastrointestinal: Negative for abdominal pain, nausea and vomiting. Genitourinary:  Negative for dysuria. Musculoskeletal:  Negative for arthralgias. Skin:  Negative for rash. Neurological:  Negative for dizziness. Psychiatric/Behavioral:  Negative for confusion. All other systems reviewed and are negative. Positives and Pertinent negatives as per HPI. SURGICAL HISTORY     Past Surgical History:   Procedure Laterality Date    AORTIC VALVE REPLACEMENT  01/2019    CHOLECYSTECTOMY      COLONOSCOPY N/A 4/25/2023    COLONOSCOPY POLYPECTOMY HOT BIOPSY performed by Jose Manuel Gates MD at [facility] ENDOSCOPY    CT BIOPSY RENAL  10/5/2023    [facility]    HERNIA REPAIR      x 2       CURRENTMEDICATIONS       Previous Medications    AMIODARONE (PACERONE) 400 MG TABLET    Take 1 tablet by mouth 2 times daily    AMLODIPINE (NORVASC) 5 MG TABLET    Take 1 tablet by mouth daily    BECLOMETHASONE (QVAR REDIHALER) 40 MCG/ACT AERB INHALER    Inhale 1 puff into the lungs 2 times daily as needed (SOB/W)    BRIVARACETAM (BRIVIACT) 50 MG TABLET    Take 1 tablet by mouth 2 times daily. ENOXAPARIN (LOVENOX) 300 MG/3ML INJECTION    Inject 1 mg/kg into the skin 2 times daily    FLUTICASONE (FLONASE) 50 MCG/ACT NASAL SPRAY    2 sprays by Each Nostril route daily as needed for Rhinitis or Allergies    KETOROLAC (TORADOL) 10 MG TABLET    Take 1 tablet by mouth every 6 hours as needed for Pain    METOPROLOL SUCCINATE (TOPROL XL) 100 MG EXTENDED RELEASE TABLET    Take 1 tablet by mouth every morning    OXYGEN    Inhale 2 L/min into the lungs nightly    PREDNISONE (DELTASONE) 5 MG TABLET    Take 1 tablet by mouth 2 times daily    VITAMIN D3 (CHOLECALCIFEROL) 125 MCG (5000 UT) TABS TABLET    Take 1 tablet by mouth every other day    WARFARIN (COUMADIN) 5 MG TABLET    Take 1.5 tablets by mouth every evening INR's MANAGED BY PCP (DR. BUNCH)       ALLERGIES     Patient has no known

## 2023-10-08 NOTE — PROGRESS NOTES
4 Eyes Skin Assessment     NAME:  Duncan Carrel  YOB: 1970  MEDICAL RECORD NUMBER:  [id number]    The patient is being assessed for  Admission    I agree that at least one RN has performed a thorough Head to Toe Skin Assessment on the patient. ALL assessment sites listed below have been assessed. Areas assessed by both nurses:    Head, Face, Ears, Shoulders, Back, Chest, Arms, Elbows, Hands, Sacrum. Buttock, Coccyx, Ischium, Legs. Feet and Heels, and Under Medical Devices         Does the Patient have a Wound?  No noted wound(s)       Cesar Prevention initiated by RN: No  Wound Care Orders initiated by RN: No    Pressure Injury (Stage 3,4, Unstageable, DTI, NWPT, and Complex wounds) if present, place Wound referral order by RN under : No    New Ostomies, if present place, Ostomy referral order under : No     Nurse 1 eSignature: Electronically signed by Criselda King RN on 10/8/23 at 3:39 PM EDT    **SHARE this note so that the co-signing nurse can place an eSignature**    Nurse 2 eSignature: Electronically signed by Gopi Gomez RN on 10/8/23 at 3:42 PM EDT

## 2023-10-08 NOTE — ED NOTES
PAS called and pt placed in will call. PAS instructed on transport needs of pt. Pt awaiting bed assignment at [facility].       Lynn Manjarrez  10/08/23 0393

## 2023-10-09 ENCOUNTER — ANESTHESIA (OUTPATIENT)
Dept: ICU | Age: 53
End: 2023-10-09
Payer: COMMERCIAL

## 2023-10-09 ENCOUNTER — APPOINTMENT (OUTPATIENT)
Dept: GENERAL RADIOLOGY | Age: 53
DRG: 811 | End: 2023-10-09
Payer: COMMERCIAL

## 2023-10-09 ENCOUNTER — ANESTHESIA EVENT (OUTPATIENT)
Dept: ICU | Age: 53
End: 2023-10-09
Payer: COMMERCIAL

## 2023-10-09 ENCOUNTER — APPOINTMENT (OUTPATIENT)
Dept: CT IMAGING | Age: 53
DRG: 811 | End: 2023-10-09
Payer: COMMERCIAL

## 2023-10-09 ENCOUNTER — APPOINTMENT (OUTPATIENT)
Dept: ULTRASOUND IMAGING | Age: 53
DRG: 811 | End: 2023-10-09
Payer: COMMERCIAL

## 2023-10-09 LAB
AADO2: 207.8 MMHG
AADO2: 259.8 MMHG
AADO2: 388.7 MMHG
AADO2: 582.5 MMHG
AADO2: 589.3 MMHG
AADO2: 591.2 MMHG
ABO/RH: NORMAL
ALBUMIN SERPL-MCNC: 2.5 G/DL (ref 3.5–5.2)
ALP SERPL-CCNC: 42 U/L (ref 40–129)
ALT SERPL-CCNC: 16 U/L (ref 0–40)
ANION GAP SERPL CALCULATED.3IONS-SCNC: 11 MMOL/L (ref 7–16)
ANION GAP SERPL CALCULATED.3IONS-SCNC: 11 MMOL/L (ref 7–16)
ANION GAP SERPL CALCULATED.3IONS-SCNC: 12 MMOL/L (ref 7–16)
ANTIBODY SCREEN: NEGATIVE
ARM BAND NUMBER: NORMAL
AST SERPL-CCNC: 31 U/L (ref 0–39)
ATYPICAL LYMPHOCYTE ABSOLUTE COUNT: 0.06 K/UL (ref 0–0.46)
ATYPICAL LYMPHOCYTES: 2 % (ref 0–4)
B PARAP IS1001 DNA NPH QL NAA+NON-PROBE: NOT DETECTED
B PERT DNA SPEC QL NAA+PROBE: NOT DETECTED
B.E.: -10.6 MMOL/L (ref -3–3)
B.E.: -4.4 MMOL/L (ref -3–3)
B.E.: -7.3 MMOL/L (ref -3–3)
B.E.: -8.1 MMOL/L (ref -3–3)
B.E.: -8.3 MMOL/L (ref -3–3)
B.E.: -8.4 MMOL/L (ref -3–3)
BASOPHILS # BLD: 0 K/UL (ref 0–0.2)
BASOPHILS NFR BLD: 0 % (ref 0–2)
BILIRUB SERPL-MCNC: 0.6 MG/DL (ref 0–1.2)
BLOOD BANK BLOOD PRODUCT EXPIRATION DATE: NORMAL
BLOOD BANK COMMENT: NORMAL
BLOOD BANK DISPENSE STATUS: NORMAL
BLOOD BANK ISBT PRODUCT BLOOD TYPE: 1700
BLOOD BANK ISBT PRODUCT BLOOD TYPE: 1700
BLOOD BANK ISBT PRODUCT BLOOD TYPE: 9500
BLOOD BANK PRODUCT CODE: NORMAL
BLOOD BANK SAMPLE EXPIRATION: NORMAL
BLOOD BANK UNIT TYPE AND RH: NORMAL
BNP SERPL-MCNC: ABNORMAL PG/ML (ref 0–125)
BPU ID: NORMAL
BUN SERPL-MCNC: 37 MG/DL (ref 6–20)
BUN SERPL-MCNC: 39 MG/DL (ref 6–20)
BUN SERPL-MCNC: 43 MG/DL (ref 6–20)
C PNEUM DNA NPH QL NAA+NON-PROBE: NOT DETECTED
CA-I BLD-SCNC: 1.12 MMOL/L (ref 1.15–1.33)
CALCIUM SERPL-MCNC: 7.7 MG/DL (ref 8.6–10.2)
CALCIUM SERPL-MCNC: 7.9 MG/DL (ref 8.6–10.2)
CALCIUM SERPL-MCNC: 8 MG/DL (ref 8.6–10.2)
CHLORIDE SERPL-SCNC: 103 MMOL/L (ref 98–107)
CHLORIDE SERPL-SCNC: 105 MMOL/L (ref 98–107)
CHLORIDE SERPL-SCNC: 99 MMOL/L (ref 98–107)
CLOT ANGLE.KAOLIN INDUCED BLD RES TEG: 58.2 DEG (ref 53–70)
CO2 SERPL-SCNC: 17 MMOL/L (ref 22–29)
CO2 SERPL-SCNC: 17 MMOL/L (ref 22–29)
CO2 SERPL-SCNC: 20 MMOL/L (ref 22–29)
COHB: 1.1 % (ref 0–1.5)
COHB: 1.5 % (ref 0–1.5)
COHB: 1.6 % (ref 0–1.5)
COHB: 2 % (ref 0–1.5)
COMPONENT: NORMAL
CREAT SERPL-MCNC: 2.4 MG/DL (ref 0.7–1.2)
CREAT SERPL-MCNC: 2.5 MG/DL (ref 0.7–1.2)
CREAT SERPL-MCNC: 2.9 MG/DL (ref 0.7–1.2)
CRITICAL: ABNORMAL
CROSSMATCH RESULT: NORMAL
CROSSMATCH RESULT: NORMAL
DATE ANALYZED: ABNORMAL
DATE OF COLLECTION: ABNORMAL
EKG ATRIAL RATE: 141 BPM
EKG ATRIAL RATE: 85 BPM
EKG P AXIS: 73 DEGREES
EKG P-R INTERVAL: 146 MS
EKG Q-T INTERVAL: 358 MS
EKG Q-T INTERVAL: 370 MS
EKG QRS DURATION: 104 MS
EKG QRS DURATION: 106 MS
EKG QTC CALCULATION (BAZETT): 440 MS
EKG QTC CALCULATION (BAZETT): 497 MS
EKG R AXIS: 13 DEGREES
EKG R AXIS: 4 DEGREES
EKG T AXIS: 49 DEGREES
EKG T AXIS: 53 DEGREES
EKG VENTRICULAR RATE: 116 BPM
EKG VENTRICULAR RATE: 85 BPM
EOSINOPHIL # BLD: 0.03 K/UL (ref 0.05–0.5)
EOSINOPHILS RELATIVE PERCENT: 1 % (ref 0–6)
EPL-TEG: 0 % (ref 0–15)
ERYTHROCYTE [DISTWIDTH] IN BLOOD BY AUTOMATED COUNT: 18.5 % (ref 11.5–15)
FIO2: 100 %
FIO2: 70 %
FIO2: 80 %
FLUAV RNA NPH QL NAA+NON-PROBE: NOT DETECTED
FLUBV RNA NPH QL NAA+NON-PROBE: NOT DETECTED
G-TEG: 17.7 KDYN/CM2 (ref 4.5–11)
GFR SERPL CREATININE-BSD FRML MDRD: 25 ML/MIN/1.73M2
GFR SERPL CREATININE-BSD FRML MDRD: 30 ML/MIN/1.73M2
GFR SERPL CREATININE-BSD FRML MDRD: 32 ML/MIN/1.73M2
GLUCOSE BLD-MCNC: 115 MG/DL (ref 74–99)
GLUCOSE BLD-MCNC: 118 MG/DL (ref 74–99)
GLUCOSE BLD-MCNC: 124 MG/DL (ref 74–99)
GLUCOSE BLD-MCNC: 128 MG/DL (ref 74–99)
GLUCOSE BLD-MCNC: 139 MG/DL (ref 74–99)
GLUCOSE BLD-MCNC: 165 MG/DL (ref 74–99)
GLUCOSE BLD-MCNC: 197 MG/DL (ref 74–99)
GLUCOSE SERPL-MCNC: 112 MG/DL (ref 74–99)
GLUCOSE SERPL-MCNC: 119 MG/DL (ref 74–99)
GLUCOSE SERPL-MCNC: 151 MG/DL (ref 74–99)
HADV DNA NPH QL NAA+NON-PROBE: NOT DETECTED
HCO3: 16.2 MMOL/L (ref 22–26)
HCO3: 17 MMOL/L (ref 22–26)
HCO3: 17.3 MMOL/L (ref 22–26)
HCO3: 19.1 MMOL/L (ref 22–26)
HCO3: 19.1 MMOL/L (ref 22–26)
HCO3: 21.1 MMOL/L (ref 22–26)
HCOV 229E RNA NPH QL NAA+NON-PROBE: NOT DETECTED
HCOV HKU1 RNA NPH QL NAA+NON-PROBE: NOT DETECTED
HCOV NL63 RNA NPH QL NAA+NON-PROBE: NOT DETECTED
HCOV OC43 RNA NPH QL NAA+NON-PROBE: NOT DETECTED
HCT VFR BLD AUTO: 20.7 % (ref 37–54)
HCT VFR BLD AUTO: 25.2 % (ref 37–54)
HGB BLD-MCNC: 6.7 G/DL (ref 12.5–16.5)
HGB BLD-MCNC: 8.2 G/DL (ref 12.5–16.5)
HHB: 0.7 % (ref 0–5)
HHB: 0.7 % (ref 0–5)
HHB: 0.8 % (ref 0–5)
HHB: 10.3 % (ref 0–5)
HHB: 10.3 % (ref 0–5)
HHB: 10.8 % (ref 0–5)
HMPV RNA NPH QL NAA+NON-PROBE: NOT DETECTED
HPIV1 RNA NPH QL NAA+NON-PROBE: NOT DETECTED
HPIV2 RNA NPH QL NAA+NON-PROBE: NOT DETECTED
HPIV3 RNA NPH QL NAA+NON-PROBE: NOT DETECTED
HPIV4 RNA NPH QL NAA+NON-PROBE: NOT DETECTED
INR PPP: 1.3
KINETICS TEG: 2.6 MIN (ref 1–3)
LAB: ABNORMAL
LACTATE BLDV-SCNC: 1 MMOL/L (ref 0.5–2.2)
LACTATE BLDV-SCNC: 1.1 MMOL/L (ref 0.5–2.2)
LACTATE BLDV-SCNC: 1.2 MMOL/L (ref 0.5–2.2)
LACTATE BLDV-SCNC: 1.3 MMOL/L (ref 0.5–2.2)
LY30 (LYSIS) TEG: 0 % (ref 0–8)
LYMPHOCYTES NFR BLD: 0.41 K/UL (ref 1.5–4)
LYMPHOCYTES RELATIVE PERCENT: 11 % (ref 20–42)
Lab: 1236
Lab: 1502
Lab: 1818
Lab: 220
Lab: 557
Lab: 839
M PNEUMO DNA NPH QL NAA+NON-PROBE: NOT DETECTED
MA (MAX CLOT) TEG: 78 MM (ref 50–70)
MAGNESIUM SERPL-MCNC: 1.8 MG/DL (ref 1.6–2.6)
MCH RBC QN AUTO: 26.2 PG (ref 26–35)
MCHC RBC AUTO-ENTMCNC: 32.5 G/DL (ref 32–34.5)
MCV RBC AUTO: 80.5 FL (ref 80–99.9)
METAMYELOCYTES ABSOLUTE COUNT: 0.19 K/UL (ref 0–0.12)
METAMYELOCYTES: 5 % (ref 0–1)
METHB: 0.3 % (ref 0–1.5)
METHB: 0.4 % (ref 0–1.5)
METHB: 0.5 % (ref 0–1.5)
METHB: 0.5 % (ref 0–1.5)
METHB: 0.6 % (ref 0–1.5)
METHB: 0.7 % (ref 0–1.5)
MODE: ABNORMAL
MODE: AC
MONOCYTES NFR BLD: 0.13 K/UL (ref 0.1–0.95)
MONOCYTES NFR BLD: 4 % (ref 2–12)
MYELOCYTES ABSOLUTE COUNT: 0.09 K/UL
MYELOCYTES: 3 %
NEUTROPHILS NFR BLD: 75 % (ref 43–80)
NEUTS SEG NFR BLD: 2.69 K/UL (ref 1.8–7.3)
NUCLEATED RED BLOOD CELLS: 6 PER 100 WBC
O2 CONTENT: 10.4 ML/DL
O2 CONTENT: 10.5 ML/DL
O2 CONTENT: 10.6 ML/DL
O2 CONTENT: 11.3 ML/DL
O2 CONTENT: 11.3 ML/DL
O2 CONTENT: 11.9 ML/DL
O2 SATURATION: 89 % (ref 92–98.5)
O2 SATURATION: 89.5 % (ref 92–98.5)
O2 SATURATION: 89.5 % (ref 92–98.5)
O2 SATURATION: 99.2 % (ref 92–98.5)
O2 SATURATION: 99.3 % (ref 92–98.5)
O2 SATURATION: 99.3 % (ref 92–98.5)
O2HB: 87.3 % (ref 94–97)
O2HB: 87.4 % (ref 94–97)
O2HB: 88 % (ref 94–97)
O2HB: 97.1 % (ref 94–97)
O2HB: 97.1 % (ref 94–97)
O2HB: 97.3 % (ref 94–97)
OPERATOR ID: 1768
OPERATOR ID: 1768
OPERATOR ID: 366
OPERATOR ID: 366
OPERATOR ID: ABNORMAL
OPERATOR ID: ABNORMAL
PATIENT TEMP: 37 C
PCO2: 34.1 MMHG (ref 35–45)
PCO2: 36.4 MMHG (ref 35–45)
PCO2: 39.8 MMHG (ref 35–45)
PCO2: 40.4 MMHG (ref 35–45)
PCO2: 42.4 MMHG (ref 35–45)
PCO2: 47 MMHG (ref 35–45)
PEEP/CPAP: 12 CMH2O
PEEP/CPAP: 15 CMH2O
PEEP/CPAP: 15 CMH2O
PFO2: 0.62 MMHG/%
PFO2: 0.63 MMHG/%
PFO2: 0.66 MMHG/%
PFO2: 2.52 MMHG/%
PFO2: 3.08 MMHG/%
PFO2: 3.29 MMHG/%
PH BLOOD GAS: 7.23 (ref 7.35–7.45)
PH BLOOD GAS: 7.23 (ref 7.35–7.45)
PH BLOOD GAS: 7.27 (ref 7.35–7.45)
PH BLOOD GAS: 7.29 (ref 7.35–7.45)
PH BLOOD GAS: 7.32 (ref 7.35–7.45)
PH BLOOD GAS: 7.33 (ref 7.35–7.45)
PHOSPHATE SERPL-MCNC: 4.3 MG/DL (ref 2.5–4.5)
PLATELET CONFIRMATION: NORMAL
PLATELET, FLUORESCENCE: 85 K/UL (ref 130–450)
PMV BLD AUTO: 10.9 FL (ref 7–12)
PO2: 230.4 MMHG (ref 75–100)
PO2: 246.1 MMHG (ref 75–100)
PO2: 252.3 MMHG (ref 75–100)
PO2: 62.3 MMHG (ref 75–100)
PO2: 62.7 MMHG (ref 75–100)
PO2: 65.7 MMHG (ref 75–100)
POTASSIUM SERPL-SCNC: 5.7 MMOL/L (ref 3.5–5)
POTASSIUM SERPL-SCNC: 5.8 MMOL/L (ref 3.5–5)
POTASSIUM SERPL-SCNC: 6.3 MMOL/L (ref 3.5–5)
PROT SERPL-MCNC: 5.5 G/DL (ref 6.4–8.3)
PROTHROMBIN TIME: 14.2 SEC (ref 9.3–12.4)
RBC # BLD AUTO: 3.13 M/UL (ref 3.8–5.8)
RBC # BLD: ABNORMAL 10*6/UL
REACTION TIME TEG: 10.6 MIN (ref 5–10)
RI(T): 0.9
RI(T): 1.06
RI(T): 1.54
RI(T): 8.87
RI(T): 9.43
RI(T): 9.46
RR MECHANICAL: 16 B/MIN
RR MECHANICAL: 18 B/MIN
RR MECHANICAL: 22 B/MIN
RSV RNA NPH QL NAA+NON-PROBE: NOT DETECTED
RV+EV RNA NPH QL NAA+NON-PROBE: NOT DETECTED
SARS-COV-2 RNA NPH QL NAA+NON-PROBE: NOT DETECTED
SODIUM SERPL-SCNC: 130 MMOL/L (ref 132–146)
SODIUM SERPL-SCNC: 132 MMOL/L (ref 132–146)
SODIUM SERPL-SCNC: 133 MMOL/L (ref 132–146)
SOURCE, BLOOD GAS: ABNORMAL
SPECIMEN DESCRIPTION: NORMAL
THB: 7.2 G/DL (ref 11.5–16.5)
THB: 7.8 G/DL (ref 11.5–16.5)
THB: 8.2 G/DL (ref 11.5–16.5)
THB: 8.4 G/DL (ref 11.5–16.5)
THB: 8.6 G/DL (ref 11.5–16.5)
THB: 9.1 G/DL (ref 11.5–16.5)
TIME ANALYZED: 1242
TIME ANALYZED: 1508
TIME ANALYZED: 1836
TIME ANALYZED: 228
TIME ANALYZED: 615
TIME ANALYZED: 841
TRANSFUSION STATUS: NORMAL
TROPONIN I SERPL HS-MCNC: 64 NG/L (ref 0–11)
UNIT DIVISION: 0
UNIT ISSUE DATE/TIME: NORMAL
UNIT TAG COMMENT: NORMAL
UNIT TAG COMMENT: NORMAL
VT MECHANICAL: 450 ML
VT MECHANICAL: 500 ML
VT MECHANICAL: 510 ML
WBC # BLD: ABNORMAL 10*3/UL
WBC OTHER # BLD: 3.6 K/UL (ref 4.5–11.5)

## 2023-10-09 PROCEDURE — 6360000002 HC RX W HCPCS: Performed by: STUDENT IN AN ORGANIZED HEALTH CARE EDUCATION/TRAINING PROGRAM

## 2023-10-09 PROCEDURE — 86927 PLASMA FRESH FROZEN: CPT

## 2023-10-09 PROCEDURE — A4216 STERILE WATER/SALINE, 10 ML: HCPCS | Performed by: STUDENT IN AN ORGANIZED HEALTH CARE EDUCATION/TRAINING PROGRAM

## 2023-10-09 PROCEDURE — 6370000000 HC RX 637 (ALT 250 FOR IP): Performed by: STUDENT IN AN ORGANIZED HEALTH CARE EDUCATION/TRAINING PROGRAM

## 2023-10-09 PROCEDURE — 2500000003 HC RX 250 WO HCPCS: Performed by: INTERNAL MEDICINE

## 2023-10-09 PROCEDURE — 31500 INSERT EMERGENCY AIRWAY: CPT

## 2023-10-09 PROCEDURE — 87205 SMEAR GRAM STAIN: CPT

## 2023-10-09 PROCEDURE — 85610 PROTHROMBIN TIME: CPT

## 2023-10-09 PROCEDURE — 83605 ASSAY OF LACTIC ACID: CPT

## 2023-10-09 PROCEDURE — 94799 UNLISTED PULMONARY SVC/PX: CPT

## 2023-10-09 PROCEDURE — 82330 ASSAY OF CALCIUM: CPT

## 2023-10-09 PROCEDURE — 84100 ASSAY OF PHOSPHORUS: CPT

## 2023-10-09 PROCEDURE — 6360000002 HC RX W HCPCS

## 2023-10-09 PROCEDURE — 71045 X-RAY EXAM CHEST 1 VIEW: CPT

## 2023-10-09 PROCEDURE — 84484 ASSAY OF TROPONIN QUANT: CPT

## 2023-10-09 PROCEDURE — 83880 ASSAY OF NATRIURETIC PEPTIDE: CPT

## 2023-10-09 PROCEDURE — 85014 HEMATOCRIT: CPT

## 2023-10-09 PROCEDURE — 82962 GLUCOSE BLOOD TEST: CPT

## 2023-10-09 PROCEDURE — C9113 INJ PANTOPRAZOLE SODIUM, VIA: HCPCS | Performed by: STUDENT IN AN ORGANIZED HEALTH CARE EDUCATION/TRAINING PROGRAM

## 2023-10-09 PROCEDURE — 31500 INSERT EMERGENCY AIRWAY: CPT | Performed by: NURSE ANESTHETIST, CERTIFIED REGISTERED

## 2023-10-09 PROCEDURE — 85025 COMPLETE CBC W/AUTO DIFF WBC: CPT

## 2023-10-09 PROCEDURE — P9047 ALBUMIN (HUMAN), 25%, 50ML: HCPCS | Performed by: STUDENT IN AN ORGANIZED HEALTH CARE EDUCATION/TRAINING PROGRAM

## 2023-10-09 PROCEDURE — 80048 BASIC METABOLIC PNL TOTAL CA: CPT

## 2023-10-09 PROCEDURE — 31624 DX BRONCHOSCOPE/LAVAGE: CPT | Performed by: INTERNAL MEDICINE

## 2023-10-09 PROCEDURE — 89051 BODY FLUID CELL COUNT: CPT

## 2023-10-09 PROCEDURE — 85390 FIBRINOLYSINS SCREEN I&R: CPT

## 2023-10-09 PROCEDURE — 6370000000 HC RX 637 (ALT 250 FOR IP)

## 2023-10-09 PROCEDURE — 87070 CULTURE OTHR SPECIMN AEROBIC: CPT

## 2023-10-09 PROCEDURE — 31624 DX BRONCHOSCOPE/LAVAGE: CPT

## 2023-10-09 PROCEDURE — 2709999900 HC NON-CHARGEABLE SUPPLY

## 2023-10-09 PROCEDURE — 93970 EXTREMITY STUDY: CPT

## 2023-10-09 PROCEDURE — 82805 BLOOD GASES W/O2 SATURATION: CPT

## 2023-10-09 PROCEDURE — 2580000003 HC RX 258

## 2023-10-09 PROCEDURE — 83735 ASSAY OF MAGNESIUM: CPT

## 2023-10-09 PROCEDURE — 2500000003 HC RX 250 WO HCPCS

## 2023-10-09 PROCEDURE — 85018 HEMOGLOBIN: CPT

## 2023-10-09 PROCEDURE — P9016 RBC LEUKOCYTES REDUCED: HCPCS

## 2023-10-09 PROCEDURE — 87102 FUNGUS ISOLATION CULTURE: CPT

## 2023-10-09 PROCEDURE — 0202U NFCT DS 22 TRGT SARS-COV-2: CPT

## 2023-10-09 PROCEDURE — 85384 FIBRINOGEN ACTIVITY: CPT

## 2023-10-09 PROCEDURE — 80053 COMPREHEN METABOLIC PANEL: CPT

## 2023-10-09 PROCEDURE — 93010 ELECTROCARDIOGRAM REPORT: CPT | Performed by: INTERNAL MEDICINE

## 2023-10-09 PROCEDURE — 2580000003 HC RX 258: Performed by: STUDENT IN AN ORGANIZED HEALTH CARE EDUCATION/TRAINING PROGRAM

## 2023-10-09 PROCEDURE — 87305 ASPERGILLUS AG IA: CPT

## 2023-10-09 PROCEDURE — 2000000000 HC ICU R&B

## 2023-10-09 PROCEDURE — 94640 AIRWAY INHALATION TREATMENT: CPT

## 2023-10-09 PROCEDURE — P9017 PLASMA 1 DONOR FRZ W/IN 8 HR: HCPCS

## 2023-10-09 PROCEDURE — 85347 COAGULATION TIME ACTIVATED: CPT

## 2023-10-09 PROCEDURE — 2580000003 HC RX 258: Performed by: INTERNAL MEDICINE

## 2023-10-09 PROCEDURE — 99291 CRITICAL CARE FIRST HOUR: CPT | Performed by: INTERNAL MEDICINE

## 2023-10-09 PROCEDURE — 87075 CULTR BACTERIA EXCEPT BLOOD: CPT

## 2023-10-09 PROCEDURE — 6360000002 HC RX W HCPCS: Performed by: INTERNAL MEDICINE

## 2023-10-09 PROCEDURE — 2500000003 HC RX 250 WO HCPCS: Performed by: STUDENT IN AN ORGANIZED HEALTH CARE EDUCATION/TRAINING PROGRAM

## 2023-10-09 PROCEDURE — 85576 BLOOD PLATELET AGGREGATION: CPT

## 2023-10-09 PROCEDURE — 94002 VENT MGMT INPAT INIT DAY: CPT

## 2023-10-09 PROCEDURE — 36430 TRANSFUSION BLD/BLD COMPNT: CPT

## 2023-10-09 RX ORDER — FUROSEMIDE 10 MG/ML
40 INJECTION INTRAMUSCULAR; INTRAVENOUS ONCE
Status: COMPLETED | OUTPATIENT
Start: 2023-10-09 | End: 2023-10-09

## 2023-10-09 RX ORDER — DEXTROSE MONOHYDRATE 100 MG/ML
INJECTION, SOLUTION INTRAVENOUS CONTINUOUS PRN
Status: DISCONTINUED | OUTPATIENT
Start: 2023-10-09 | End: 2023-10-13 | Stop reason: HOSPADM

## 2023-10-09 RX ORDER — MIDAZOLAM HYDROCHLORIDE 2 MG/2ML
4 INJECTION, SOLUTION INTRAMUSCULAR; INTRAVENOUS EVERY 4 HOURS PRN
Status: DISCONTINUED | OUTPATIENT
Start: 2023-10-09 | End: 2023-10-09

## 2023-10-09 RX ORDER — NOREPINEPHRINE BITARTRATE 0.06 MG/ML
1-100 INJECTION, SOLUTION INTRAVENOUS CONTINUOUS
Status: DISCONTINUED | OUTPATIENT
Start: 2023-10-09 | End: 2023-10-10

## 2023-10-09 RX ORDER — SODIUM CHLORIDE 9 MG/ML
INJECTION, SOLUTION INTRAVENOUS PRN
Status: DISCONTINUED | OUTPATIENT
Start: 2023-10-09 | End: 2023-10-10

## 2023-10-09 RX ORDER — MIDAZOLAM HYDROCHLORIDE 1 MG/ML
1-10 INJECTION, SOLUTION INTRAVENOUS CONTINUOUS
Status: DISCONTINUED | OUTPATIENT
Start: 2023-10-09 | End: 2023-10-09

## 2023-10-09 RX ORDER — MAGNESIUM SULFATE IN WATER 40 MG/ML
2000 INJECTION, SOLUTION INTRAVENOUS ONCE
Status: COMPLETED | OUTPATIENT
Start: 2023-10-09 | End: 2023-10-09

## 2023-10-09 RX ORDER — SUCCINYLCHOLINE CHLORIDE 20 MG/ML
INJECTION INTRAMUSCULAR; INTRAVENOUS
Status: COMPLETED
Start: 2023-10-09 | End: 2023-10-09

## 2023-10-09 RX ORDER — PROPOFOL 10 MG/ML
5-50 INJECTION, EMULSION INTRAVENOUS CONTINUOUS
Status: DISCONTINUED | OUTPATIENT
Start: 2023-10-09 | End: 2023-10-09

## 2023-10-09 RX ORDER — FENTANYL CITRATE 50 UG/ML
100 INJECTION, SOLUTION INTRAMUSCULAR; INTRAVENOUS ONCE
Status: DISCONTINUED | OUTPATIENT
Start: 2023-10-09 | End: 2023-10-13

## 2023-10-09 RX ORDER — WATER 10 ML/10ML
INJECTION INTRAMUSCULAR; INTRAVENOUS; SUBCUTANEOUS
Status: COMPLETED
Start: 2023-10-09 | End: 2023-10-09

## 2023-10-09 RX ORDER — MIDAZOLAM HYDROCHLORIDE 2 MG/2ML
4 INJECTION, SOLUTION INTRAMUSCULAR; INTRAVENOUS
Status: DISCONTINUED | OUTPATIENT
Start: 2023-10-09 | End: 2023-10-09

## 2023-10-09 RX ORDER — MIDAZOLAM HYDROCHLORIDE 2 MG/2ML
4 INJECTION, SOLUTION INTRAMUSCULAR; INTRAVENOUS ONCE
Status: DISCONTINUED | OUTPATIENT
Start: 2023-10-09 | End: 2023-10-09

## 2023-10-09 RX ORDER — FENTANYL CITRATE-0.9 % NACL/PF 10 MCG/ML
25-200 PLASTIC BAG, INJECTION (ML) INTRAVENOUS CONTINUOUS
Status: DISCONTINUED | OUTPATIENT
Start: 2023-10-09 | End: 2023-10-09

## 2023-10-09 RX ORDER — NOREPINEPHRINE BITARTRATE 0.06 MG/ML
INJECTION, SOLUTION INTRAVENOUS
Status: COMPLETED
Start: 2023-10-09 | End: 2023-10-09

## 2023-10-09 RX ORDER — POLYVINYL ALCOHOL 14 MG/ML
1 SOLUTION/ DROPS OPHTHALMIC
Status: DISCONTINUED | OUTPATIENT
Start: 2023-10-09 | End: 2023-10-13 | Stop reason: HOSPADM

## 2023-10-09 RX ORDER — LEVETIRACETAM 500 MG/5ML
500 INJECTION, SOLUTION, CONCENTRATE INTRAVENOUS EVERY 12 HOURS
Status: DISCONTINUED | OUTPATIENT
Start: 2023-10-09 | End: 2023-10-13 | Stop reason: HOSPADM

## 2023-10-09 RX ORDER — FENTANYL CITRATE 50 UG/ML
100 INJECTION, SOLUTION INTRAMUSCULAR; INTRAVENOUS ONCE
Status: COMPLETED | OUTPATIENT
Start: 2023-10-09 | End: 2023-10-09

## 2023-10-09 RX ORDER — METHYLPREDNISOLONE SODIUM SUCCINATE 125 MG/2ML
INJECTION, POWDER, LYOPHILIZED, FOR SOLUTION INTRAMUSCULAR; INTRAVENOUS
Status: DISPENSED
Start: 2023-10-09 | End: 2023-10-09

## 2023-10-09 RX ORDER — FENTANYL CITRATE-0.9 % NACL/PF 10 MCG/ML
25-200 PLASTIC BAG, INJECTION (ML) INTRAVENOUS CONTINUOUS
Status: DISCONTINUED | OUTPATIENT
Start: 2023-10-09 | End: 2023-10-10

## 2023-10-09 RX ORDER — HYDRALAZINE HYDROCHLORIDE 20 MG/ML
10 INJECTION INTRAMUSCULAR; INTRAVENOUS EVERY 4 HOURS PRN
Status: CANCELLED | OUTPATIENT
Start: 2023-10-09

## 2023-10-09 RX ORDER — DEXTROSE MONOHYDRATE 100 MG/ML
INJECTION, SOLUTION INTRAVENOUS CONTINUOUS PRN
Status: DISCONTINUED | OUTPATIENT
Start: 2023-10-09 | End: 2023-10-09

## 2023-10-09 RX ORDER — POLYVINYL ALCOHOL 14 MG/ML
1 SOLUTION/ DROPS OPHTHALMIC EVERY 4 HOURS
Status: DISCONTINUED | OUTPATIENT
Start: 2023-10-09 | End: 2023-10-09

## 2023-10-09 RX ORDER — ALBUMIN (HUMAN) 12.5 G/50ML
25 SOLUTION INTRAVENOUS ONCE
Status: COMPLETED | OUTPATIENT
Start: 2023-10-09 | End: 2023-10-09

## 2023-10-09 RX ORDER — DEXTROSE MONOHYDRATE 100 MG/ML
INJECTION, SOLUTION INTRAVENOUS CONTINUOUS PRN
Status: CANCELLED | OUTPATIENT
Start: 2023-10-09

## 2023-10-09 RX ORDER — MIDAZOLAM HYDROCHLORIDE 1 MG/ML
1-10 INJECTION, SOLUTION INTRAVENOUS CONTINUOUS
Status: DISCONTINUED | OUTPATIENT
Start: 2023-10-09 | End: 2023-10-10

## 2023-10-09 RX ORDER — ARFORMOTEROL TARTRATE 15 UG/2ML
15 SOLUTION RESPIRATORY (INHALATION)
Status: DISCONTINUED | OUTPATIENT
Start: 2023-10-09 | End: 2023-10-13 | Stop reason: HOSPADM

## 2023-10-09 RX ORDER — SODIUM CHLORIDE 9 MG/ML
INJECTION, SOLUTION INTRAVENOUS PRN
Status: DISCONTINUED | OUTPATIENT
Start: 2023-10-09 | End: 2023-10-11

## 2023-10-09 RX ORDER — BUMETANIDE 0.25 MG/ML
2 INJECTION INTRAMUSCULAR; INTRAVENOUS ONCE
Status: COMPLETED | OUTPATIENT
Start: 2023-10-09 | End: 2023-10-09

## 2023-10-09 RX ORDER — VECURONIUM BROMIDE 1 MG/ML
INJECTION, POWDER, LYOPHILIZED, FOR SOLUTION INTRAVENOUS
Status: COMPLETED
Start: 2023-10-09 | End: 2023-10-09

## 2023-10-09 RX ORDER — DEXTROSE MONOHYDRATE 100 MG/ML
INJECTION, SOLUTION INTRAVENOUS CONTINUOUS PRN
Status: DISCONTINUED | OUTPATIENT
Start: 2023-10-09 | End: 2023-10-10

## 2023-10-09 RX ORDER — INSULIN LISPRO 100 [IU]/ML
0-4 INJECTION, SOLUTION INTRAVENOUS; SUBCUTANEOUS EVERY 4 HOURS
Status: DISCONTINUED | OUTPATIENT
Start: 2023-10-09 | End: 2023-10-13 | Stop reason: HOSPADM

## 2023-10-09 RX ORDER — PROPOFOL 10 MG/ML
INJECTION, EMULSION INTRAVENOUS
Status: COMPLETED
Start: 2023-10-09 | End: 2023-10-09

## 2023-10-09 RX ORDER — CHLORHEXIDINE GLUCONATE ORAL RINSE 1.2 MG/ML
15 SOLUTION DENTAL 2 TIMES DAILY
Status: DISCONTINUED | OUTPATIENT
Start: 2023-10-09 | End: 2023-10-13 | Stop reason: HOSPADM

## 2023-10-09 RX ORDER — DEXAMETHASONE SODIUM PHOSPHATE 10 MG/ML
10 INJECTION, SOLUTION INTRAMUSCULAR; INTRAVENOUS EVERY 24 HOURS
Status: DISCONTINUED | OUTPATIENT
Start: 2023-10-15 | End: 2023-10-12

## 2023-10-09 RX ORDER — FENTANYL CITRATE 50 UG/ML
50 INJECTION, SOLUTION INTRAMUSCULAR; INTRAVENOUS EVERY 4 HOURS PRN
Status: DISCONTINUED | OUTPATIENT
Start: 2023-10-09 | End: 2023-10-09

## 2023-10-09 RX ORDER — MINERAL OIL AND WHITE PETROLATUM 150; 830 MG/G; MG/G
OINTMENT OPHTHALMIC EVERY 4 HOURS
Status: DISCONTINUED | OUTPATIENT
Start: 2023-10-09 | End: 2023-10-09

## 2023-10-09 RX ORDER — SUCCINYLCHOLINE CHLORIDE 20 MG/ML
40 INJECTION INTRAMUSCULAR; INTRAVENOUS ONCE
Status: COMPLETED | OUTPATIENT
Start: 2023-10-09 | End: 2023-10-09

## 2023-10-09 RX ORDER — BUDESONIDE 0.5 MG/2ML
0.5 INHALANT ORAL
Status: DISCONTINUED | OUTPATIENT
Start: 2023-10-09 | End: 2023-10-13 | Stop reason: HOSPADM

## 2023-10-09 RX ORDER — DEXAMETHASONE SODIUM PHOSPHATE 10 MG/ML
10 INJECTION, SOLUTION INTRAMUSCULAR; INTRAVENOUS EVERY 24 HOURS
Status: DISCONTINUED | OUTPATIENT
Start: 2023-10-14 | End: 2023-10-09

## 2023-10-09 RX ADMIN — WATER 10 ML: 1 INJECTION INTRAMUSCULAR; INTRAVENOUS; SUBCUTANEOUS at 11:25

## 2023-10-09 RX ADMIN — AMIODARONE HYDROCHLORIDE 150 MG: 50 INJECTION, SOLUTION INTRAVENOUS at 12:51

## 2023-10-09 RX ADMIN — INSULIN HUMAN 10 UNITS: 100 INJECTION, SOLUTION PARENTERAL at 16:14

## 2023-10-09 RX ADMIN — BUDESONIDE 500 MCG: 0.5 INHALANT RESPIRATORY (INHALATION) at 22:12

## 2023-10-09 RX ADMIN — POLYVINYL ALCOHOL 1 DROP: 14 SOLUTION/ DROPS OPHTHALMIC at 23:14

## 2023-10-09 RX ADMIN — DEXTROSE MONOHYDRATE 250 ML: 100 INJECTION, SOLUTION INTRAVENOUS at 16:21

## 2023-10-09 RX ADMIN — ALBUTEROL SULFATE 2.5 MG: 2.5 SOLUTION RESPIRATORY (INHALATION) at 12:00

## 2023-10-09 RX ADMIN — PIPERACILLIN AND TAZOBACTAM 4500 MG: 4; .5 INJECTION, POWDER, LYOPHILIZED, FOR SOLUTION INTRAVENOUS at 06:57

## 2023-10-09 RX ADMIN — Medication 5 MCG/MIN: at 11:23

## 2023-10-09 RX ADMIN — SUCCINYLCHOLINE CHLORIDE 40 MG: 20 INJECTION, SOLUTION INTRAMUSCULAR; INTRAVENOUS at 03:54

## 2023-10-09 RX ADMIN — DEXTROSE MONOHYDRATE 0.5 MG/MIN: 50 INJECTION, SOLUTION INTRAVENOUS at 22:36

## 2023-10-09 RX ADMIN — DEXTROSE MONOHYDRATE 0.5 MG/MIN: 50 INJECTION, SOLUTION INTRAVENOUS at 19:02

## 2023-10-09 RX ADMIN — MIDAZOLAM 4 MG: 1 INJECTION INTRAMUSCULAR; INTRAVENOUS at 02:24

## 2023-10-09 RX ADMIN — DEXTROSE MONOHYDRATE 1 MG/MIN: 50 INJECTION, SOLUTION INTRAVENOUS at 13:06

## 2023-10-09 RX ADMIN — ARFORMOTEROL TARTRATE 15 MCG: 15 SOLUTION RESPIRATORY (INHALATION) at 22:12

## 2023-10-09 RX ADMIN — POLYVINYL ALCOHOL 1 DROP: 14 SOLUTION/ DROPS OPHTHALMIC at 18:14

## 2023-10-09 RX ADMIN — METHYLPREDNISOLONE SODIUM SUCCINATE 250 MG: 125 INJECTION, POWDER, LYOPHILIZED, FOR SOLUTION INTRAMUSCULAR; INTRAVENOUS at 18:18

## 2023-10-09 RX ADMIN — CALCIUM GLUCONATE 2000 MG: 98 INJECTION, SOLUTION INTRAVENOUS at 06:34

## 2023-10-09 RX ADMIN — BUMETANIDE 2 MG: 0.25 INJECTION INTRAMUSCULAR; INTRAVENOUS at 06:25

## 2023-10-09 RX ADMIN — MIDAZOLAM HYDROCHLORIDE 4 MG: 2 INJECTION, SOLUTION INTRAMUSCULAR; INTRAVENOUS at 05:15

## 2023-10-09 RX ADMIN — FENTANYL CITRATE 100 MCG: 50 INJECTION INTRAMUSCULAR; INTRAVENOUS at 11:27

## 2023-10-09 RX ADMIN — ALBUMIN (HUMAN) 25 G: 0.25 INJECTION, SOLUTION INTRAVENOUS at 09:48

## 2023-10-09 RX ADMIN — FUROSEMIDE 40 MG: 10 INJECTION, SOLUTION INTRAMUSCULAR; INTRAVENOUS at 02:44

## 2023-10-09 RX ADMIN — FUROSEMIDE 40 MG: 10 INJECTION, SOLUTION INTRAMUSCULAR; INTRAVENOUS at 09:38

## 2023-10-09 RX ADMIN — CHLORHEXIDINE GLUCONATE 15 ML: 1.2 RINSE ORAL at 20:36

## 2023-10-09 RX ADMIN — MIDAZOLAM HYDROCHLORIDE 4 MG: 2 INJECTION, SOLUTION INTRAMUSCULAR; INTRAVENOUS at 05:30

## 2023-10-09 RX ADMIN — Medication 5 MCG/MIN: at 13:58

## 2023-10-09 RX ADMIN — MAGNESIUM SULFATE HEPTAHYDRATE 2000 MG: 40 INJECTION, SOLUTION INTRAVENOUS at 06:18

## 2023-10-09 RX ADMIN — VECURONIUM BROMIDE 10 MG: 1 INJECTION, POWDER, LYOPHILIZED, FOR SOLUTION INTRAVENOUS at 11:25

## 2023-10-09 RX ADMIN — ALBUTEROL SULFATE 2.5 MG: 2.5 SOLUTION RESPIRATORY (INHALATION) at 12:02

## 2023-10-09 RX ADMIN — PROPOFOL 10 MCG/KG/MIN: 10 INJECTION, EMULSION INTRAVENOUS at 04:01

## 2023-10-09 RX ADMIN — INSULIN HUMAN 10 UNITS: 100 INJECTION, SOLUTION PARENTERAL at 05:45

## 2023-10-09 RX ADMIN — ALBUTEROL SULFATE 2.5 MG: 2.5 SOLUTION RESPIRATORY (INHALATION) at 12:03

## 2023-10-09 RX ADMIN — POLYVINYL ALCOHOL 1 DROP: 14 SOLUTION/ DROPS OPHTHALMIC at 16:13

## 2023-10-09 RX ADMIN — Medication 10 ML: at 09:29

## 2023-10-09 RX ADMIN — Medication 10 MG/HR: at 12:00

## 2023-10-09 RX ADMIN — POLYVINYL ALCOHOL 1 DROP: 14 SOLUTION/ DROPS OPHTHALMIC at 14:00

## 2023-10-09 RX ADMIN — Medication 10 ML: at 20:36

## 2023-10-09 RX ADMIN — SUCCINYLCHOLINE CHLORIDE 40 MG: 20 INJECTION INTRAMUSCULAR; INTRAVENOUS at 03:54

## 2023-10-09 RX ADMIN — CISATRACURIUM BESYLATE 2 MCG/KG/MIN: 200 INJECTION, SOLUTION INTRAVENOUS at 21:58

## 2023-10-09 RX ADMIN — DEXAMETHASONE SODIUM PHOSPHATE 20 MG: 4 INJECTION, SOLUTION INTRAMUSCULAR; INTRAVENOUS at 09:45

## 2023-10-09 RX ADMIN — METHYLPREDNISOLONE SODIUM SUCCINATE 250 MG: 125 INJECTION, POWDER, LYOPHILIZED, FOR SOLUTION INTRAMUSCULAR; INTRAVENOUS at 23:14

## 2023-10-09 RX ADMIN — VANCOMYCIN HYDROCHLORIDE 500 MG: 500 INJECTION, POWDER, LYOPHILIZED, FOR SOLUTION INTRAVENOUS at 17:01

## 2023-10-09 RX ADMIN — MIDAZOLAM HYDROCHLORIDE 4 MG: 2 INJECTION, SOLUTION INTRAMUSCULAR; INTRAVENOUS at 05:00

## 2023-10-09 RX ADMIN — PIPERACILLIN AND TAZOBACTAM 4500 MG: 4; .5 INJECTION, POWDER, LYOPHILIZED, FOR SOLUTION INTRAVENOUS at 16:08

## 2023-10-09 RX ADMIN — Medication 5 MCG/MIN: at 05:36

## 2023-10-09 RX ADMIN — POLYVINYL ALCOHOL 1 DROP: 14 SOLUTION/ DROPS OPHTHALMIC at 22:37

## 2023-10-09 RX ADMIN — Medication 200 MCG/HR: at 19:01

## 2023-10-09 RX ADMIN — SODIUM BICARBONATE 100 MEQ: 84 INJECTION INTRAVENOUS at 10:43

## 2023-10-09 RX ADMIN — CHLORHEXIDINE GLUCONATE 15 ML: 1.2 RINSE ORAL at 09:29

## 2023-10-09 RX ADMIN — Medication 200 MCG/HR: at 13:48

## 2023-10-09 RX ADMIN — INSULIN HUMAN 10 UNITS: 100 INJECTION, SOLUTION PARENTERAL at 09:18

## 2023-10-09 RX ADMIN — DEXTROSE MONOHYDRATE 250 ML: 10 INJECTION, SOLUTION INTRAVENOUS at 09:18

## 2023-10-09 RX ADMIN — FENTANYL CITRATE 100 MCG: 50 INJECTION, SOLUTION INTRAMUSCULAR; INTRAVENOUS at 05:08

## 2023-10-09 RX ADMIN — POLYVINYL ALCOHOL 1 DROP: 14 SOLUTION/ DROPS OPHTHALMIC at 20:04

## 2023-10-09 RX ADMIN — POLYVINYL ALCOHOL 1 DROP: 14 SOLUTION/ DROPS OPHTHALMIC at 10:39

## 2023-10-09 RX ADMIN — Medication 50 MCG/HR: at 04:21

## 2023-10-09 RX ADMIN — Medication 2 MG/HR: at 04:58

## 2023-10-09 RX ADMIN — Medication 10 MG/HR: at 13:49

## 2023-10-09 RX ADMIN — FENTANYL CITRATE 50 MCG: 50 INJECTION INTRAMUSCULAR; INTRAVENOUS at 01:32

## 2023-10-09 RX ADMIN — DEXTROSE MONOHYDRATE 250 ML: 100 INJECTION, SOLUTION INTRAVENOUS at 05:44

## 2023-10-09 RX ADMIN — PANTOPRAZOLE SODIUM 40 MG: 40 INJECTION, POWDER, FOR SOLUTION INTRAVENOUS at 09:14

## 2023-10-09 RX ADMIN — SODIUM BICARBONATE 100 MEQ: 84 INJECTION INTRAVENOUS at 16:05

## 2023-10-09 RX ADMIN — CISATRACURIUM BESYLATE 2 MCG/KG/MIN: 200 INJECTION, SOLUTION INTRAVENOUS at 11:24

## 2023-10-09 RX ADMIN — ALBUTEROL SULFATE 2.5 MG: 2.5 SOLUTION RESPIRATORY (INHALATION) at 12:01

## 2023-10-09 RX ADMIN — ACETAMINOPHEN 650 MG: 325 TABLET ORAL at 06:08

## 2023-10-09 RX ADMIN — WATER 250 MG: 1 INJECTION INTRAMUSCULAR; INTRAVENOUS; SUBCUTANEOUS at 11:32

## 2023-10-09 RX ADMIN — PIPERACILLIN AND TAZOBACTAM 4500 MG: 4; .5 INJECTION, POWDER, LYOPHILIZED, FOR SOLUTION INTRAVENOUS at 23:20

## 2023-10-09 RX ADMIN — LEVETIRACETAM 500 MG: 100 INJECTION, SOLUTION INTRAVENOUS at 12:50

## 2023-10-09 RX ADMIN — SODIUM ZIRCONIUM CYCLOSILICATE 10 G: 10 POWDER, FOR SUSPENSION ORAL at 16:05

## 2023-10-09 RX ADMIN — LEVETIRACETAM 500 MG: 100 INJECTION, SOLUTION INTRAVENOUS at 23:13

## 2023-10-09 RX ADMIN — Medication 200 MCG/HR: at 09:31

## 2023-10-09 ASSESSMENT — PULMONARY FUNCTION TESTS
PIF_VALUE: 31
PIF_VALUE: 37
PIF_VALUE: 26
PIF_VALUE: 29
PIF_VALUE: 29
PIF_VALUE: 34
PIF_VALUE: 32
PIF_VALUE: 45
PIF_VALUE: 28
PIF_VALUE: 20
PIF_VALUE: 27
PIF_VALUE: 28
PIF_VALUE: 35
PIF_VALUE: 29
PIF_VALUE: 29
PIF_VALUE: 34
PIF_VALUE: 21
PIF_VALUE: 35
PIF_VALUE: 29
PIF_VALUE: 28
PIF_VALUE: 28
PIF_VALUE: 35
PIF_VALUE: 34

## 2023-10-09 ASSESSMENT — PAIN SCALES - GENERAL
PAINLEVEL_OUTOF10: 0
PAINLEVEL_OUTOF10: 0

## 2023-10-09 NOTE — CARE COORDINATION
Care Coordination:  LOS 1 day, to [place] ed SOB, recent kidney bx. Hx of home 02, but increased SOB at home since bx. 75% sat room air. Pt on coumadin Pt to IR for Ac. Right perinephric hematoma, ac blood loss anemia. s/p IR embolization, FFP RBC and Vit K ordered. Admitted MICU. initially, airvo, then intubated, fentanyl, Versed. Pt recent admission to [facility] 9/11/23- 9/14/23 per previous notes- Discharged home with wife and 2 children. Has home 02 from [facility] surgical for nighttime use only. Follows at [address] [place] [address]. Follows with DR Abdifatah Ray as well.  [facility] VM    Electronically signed by Laurita Libman, RN on 10/9/2023 at 1:29 PM

## 2023-10-09 NOTE — PROGRESS NOTES
Transport states patient is too unstable to come down for CT scans at this time. Please call CT when patient is able to come for scans.

## 2023-10-09 NOTE — PROGRESS NOTES
10/09/23 0411   Patient Observation   Pulse (!) 119   Respirations (!) 32   SpO2 (!) 83 %   Breath Sounds   Right Upper Lobe Diminished;Clear   Right Middle Lobe Diminished;Clear   Right Lower Lobe Diminished;Clear   Left Upper Lobe Diminished;Clear   Left Lower Lobe Diminished;Clear   Vent Information   Ventilator Day(s) (S)  1   Ventilator ID (S)  [id number]   Ventilator Safety Check Performed Pre-Use (S)  Yes   Ventilator Initiate (S)  Yes   Vent Mode (S)  AC/VC   $Ventilation (S)  $Initial Day   Ventilator Settings   FiO2  (S)  100 %   Vt (Set, mL) (S)  510 mL   Resp Rate (Set) (S)  16 bmp   PEEP/CPAP (cmH2O) (S)  12   Vent Patient Data (Readings)   Vt (Measured) (S)  526 mL   Peak Inspiratory Pressure (cmH2O) (S)  35 cmH2O   Rate Measured (S)  16 br/min   Minute Volume (L/min) (S)  8.21 Liters   Mean Airway Pressure (cmH2O) 18 cmH20   Plateau Pressure (cm H2O) (S)  28 cm H2O   Driving Pressure 16   I:E Ratio 1:3.70   Vent Alarm Settings   High Pressure (cmH2O) 50 cmH2O   Low Minute Volume (lpm) (S)  9 L/min   High Minute Volume (lpm) (S)  22 L/min   Low Exhaled Vt (ml) (S)  410 mL   High Exhaled Vt (ml) (S)  1000 mL   RR High (bpm) (S)  35 br/min   Apnea (secs) (S)  20 secs   ETT    Placement Date/Time: 10/09/23 (c) 8165   Placement Verified By: Auscultation;Capnometry  Preoxygenation: Yes  Mask Ventilation: Ventilated by mask (1)  Technique: Video laryngoscopy  Airway Tube Size: 8 mm  Laryngoscope: (c)   Blade Size: (c) 4  Locat. ..   $ Intubation Emergent  $ Yes   Secured At (S)  24 cm  (8)   Measured From (S)  Lips   Secured By (S)  Commercial tube tijerina   Ventilator Associated Pneumonia Bundle   Elevation of Head of Bed to 30-45 Degrees  (S)  Yes   Oral Care Completed (S)  Yes   Skin Assessment   Skin Assessment Clean, dry, & intact     Patient intubated from anesthesia placed on AC 16 510 100 12 PEEP RN/Dr Aware of settings

## 2023-10-09 NOTE — PROGRESS NOTES
Spoke with Dr. Santamaria Matters regarding concerns for respiratory status as the patient's oxygen level on FiO2 100% PAP was on 88% with labored and accessory muscle breathing. Decision to intubate per Dr. Vinicius Valencia. See MAR for medications. The patient's wife was updated on plan of care and agreed to intubation.

## 2023-10-09 NOTE — ACP (ADVANCE CARE PLANNING)
Advance Care Planning   Healthcare Decision Maker:    Primary Decision Maker: Angie Tuan - [facility] - [phone]    Click here to complete Healthcare Decision Makers including selection of the Healthcare Decision Maker Relationship (ie \"Primary\").

## 2023-10-09 NOTE — PROGRESS NOTES
Notified Dr. Mario Alberto Staley of the patient's current temperature. per Dr. Vinicius Valencia, place on cooling blanket and get fever lowered prior to administering blood. Cooling blanket ordered.

## 2023-10-09 NOTE — ANESTHESIA PROCEDURE NOTES
Airway  Date/Time: 10/9/2023 3:57 AM  Urgency: urgent    Airway not difficult    General Information and Staff    Patient location during procedure: ICU  Resident/CRNA: TERRIE Disla CRNA  Performed: resident/CRNA   Performed by: TERRIE Disla CRNA  Authorized by: TERRIE Disla CRNA      Consent for Airway (if performed for an anesthetic, see related documentation for consents)  Patient identity confirmed: per hospital policy  Consent: The procedure was performed in an emergent situation. Verbal consent not obtained. Written consent not obtained. Risks and benefits: risks, benefits and alternatives were not discussed      Code status verified:yes  Indications and Patient Condition  Indications for airway management: hypercapnia, hypoxemia, pulmonary toilet, respiratory distress and cardiovascular instability  Spontaneous ventilation: present  Sedation level: deep (Propofol 100 mg, Anectine 20 mg)  Preoxygenated: yes  Patient position: sniffing  MILS not maintained throughout  Mask difficulty assessment: vent by bag mask    Final Airway Details  Final airway type: endotracheal airway      Successful airway: ETT  Cuffed: yes   Successful intubation technique: video laryngoscopy  Facilitating devices/methods: intubating stylet  Endotracheal tube insertion site: oral  Blade type: C-Mac.   Blade size: #4 (D-blade)  ETT size (mm): 8.0  Cormack-Lehane Classification: grade IIa - partial view of glottis  Placement verified by: chest auscultation and capnometry   Measured from: teeth  ETT to teeth (cm): 24  Number of attempts at approach: 1  Ventilation between attempts: bag mask  Number of other approaches attempted: 0    Additional Comments  EtCO2 25  no

## 2023-10-09 NOTE — PROCEDURES
Arterial line placement    Procedure: Left Radial arterial line placement. Indications: Continuous monitoring of blood pressure in a patient with hypotension +/- shock, on Levophed. Anesthesia: Local infiltration of 1% lidocaine. Consent:  The patient provided verbal consent for this procedure. Technique: Time Out: Immediately prior to the procedure a \"timeout\" was called to verify the correct patient and procedure. Procedure was done using strict aseptic technique. Grabiel's test was performed and was normal. Left Radial site was cleaned with chloraprep and draped. Left Radial was identified, then Lidocaine 1% was infiltrated locally. Arterial line was inserted, a good blood flow was obtained, after which guidewire was inserted all the way with no resistance. Then the canula was inserted and needle with guidewire was withdrawn. Pulsatile bright red blood flow was observed. The canula was connected to BP monitoring apparatus and a good quality waveform was noted. Then the canula was secured with 2 stay sutures of 2-0 silk after Lidocaine infiltration, following which dressing was applied. Number of sticks: 3. Number of Kits used: 3. Complications: No immediate complication. Estimated blood loss: About 1 ml. Comment: Patient tolerated the procedure well.      Casimiro Nicole MD PGY-3  10/9/2023 7:21 AM

## 2023-10-09 NOTE — CARE COORDINATION
Case Management Assessment  Initial Evaluation    Date/Time of Evaluation: 10/9/2023 1:33 PM  Assessment Completed by: Sheri Dean RN    If patient is discharged prior to next notation, then this note serves as note for discharge by case management. Patient Name: Claudio Pitts                   YOB: 1970  Diagnosis: Acute blood loss anemia [D62]  Acute pulmonary edema ([address]) [J81.0]  DEVAN (acute kidney injury) ([address]) [N17.9]  Acute respiratory failure with hypoxia ([address]) [J96.01]  Pneumonia of both lungs due to infectious organism, unspecified part of lung [J18.9]  Acute blood loss as cause of postoperative anemia [D62]                   Date / Time: 10/8/2023  6:00 AM    Patient Admission Status: Inpatient   Readmission Risk (Low < 19, Mod (19-27), High > 27): Readmission Risk Score: 15    Current PCP: Ulysses Cooley, DO  PCP verified by CM? Yes    Chart Reviewed: Yes      History Provided by: Medical Record  Patient Orientation: Other (see comment) (intubated, sedated)    Patient Cognition: Other (see comment) (intubated, sedated)    Hospitalization in the last 30 days (Readmission):  Yes    If yes, Readmission Assessment in  Navigator will be completed. Advance Directives:      Code Status: Full Code   Patient's Primary Decision Maker is: Legal Next of Kin      Discharge Planning:    Patient lives with: Spouse/Significant Other Type of Home: House  Primary Care Giver: Family  Patient Support Systems include: Spouse/Significant Other, Children   Current Financial resources:    Current community resources:    Current services prior to admission: Oxygen Therapy            Current DME:              Type of Home Care services:  None    ADLS  Prior functional level: Independent in ADLs/IADLs  Current functional level: Other (see comment) (unknown, intubated, sedated)    PT AM-PAC:   /24  OT AM-PAC:   /24    Family can provide assistance at DC:  Yes  Would you like Case Management to discuss the discharge plan with any other family members/significant others, and if so, who? Yes (next of kin)  Plans to Return to Present Housing: Yes  Other Identified Issues/Barriers to RETURNING to current housing: unknown  Potential Assistance needed at discharge: N/A            Potential DME:    Patient expects to discharge to: [address] [place] [address] for transportation at discharge:      Financial    Payor: Nicole Pinto / Plan: Rao Harvey / Product Type: *No Product type* /     Does insurance require precert for SNF: Yes    Potential assistance Purchasing Medications: No  Meds-to-Beds request:        [id number] - [place], [address] [facility] [phone] Jw Wil[place] [phone]  [address]  [address] [place] [address]  Phone: [phone] Fax: [phone]      Notes:    Factors facilitating achievement of predicted outcomes: Family support    Barriers to discharge: Medical complications    Additional Case Management Notes: The Plan for Transition of Care is related to the following treatment goals of Acute blood loss anemia [D62]  Acute pulmonary edema (HCC) [J81.0]  DEVAN (acute kidney injury) ([address]) [N17.9]  Acute respiratory failure with hypoxia (HCC) [J96.01]  Pneumonia of both lungs due to infectious organism, unspecified part of lung [J18.9]  Acute blood loss as cause of postoperative anemia [I75]    IF APPLICABLE: The Patient and/or patient representative Felice and his family were provided with a choice of provider and agrees with the discharge plan. Freedom of choice list with basic dialogue that supports the patient's individualized plan of care/goals and shares the quality data associated with the providers was provided to:     Patient Representative Name:       The Patient and/or Patient Representative Agree with the Discharge Plan?       Rebeca Metzger RN  Case Management Department  Ph: [phone] Fax: [phone]

## 2023-10-09 NOTE — H&P
[facility]  History and Physical      CHIEF COMPLAINT:    Chief Complaint   Patient presents with    Shortness of Breath     Had kidney biopsy on Thurs because of Antiphospholipd syndrome and since that evening c/o shortness of breath , worse when lie flat         Patient of Subha Blank DO presents with:  Acute blood loss as cause of postoperative anemia    History of Present Illness:   Patient is a 25-year-old male with past medical history of a clotting disorder, DVT, hypertension, lupus anticoagulant disorder, nocturnal hypoxemia, DEMETRIUS, seizure disorder, and recent kidney biopsy and evaluation for lupus. Patient states after the biopsy he became short of breath. This is worse with exertion as well as lying flat. Patient does currently utilize 2 L O2 at night when needed. Patient was found to be hypoxic 75% on room air. Patient admits he did have mild amount of pain after the biopsy but this is since improved. Nothing makes them better. The patient did have supplemental oxygen applied via EMS. Patient arrived on 15 L nonrebreather. Patient is a Coumadin patient for history of DVTs. Patient states he is off his Coumadin for the procedure but was on Lovenox. Patient admits he did take 73 mg of Lovenox last night and 5 mg of Coumadin. ER work-up revealed a heme globin 5.5 recheck was 5.3. CMP revealed acute kidney injury with creatinine 2.3, troponin 47> 52, imaging revealed bilateral pleural effusions and pneumonia. Patient was started on IV Vanco, packed red blood cells transfusion and admitted to the ICU. REVIEW OF SYSTEMS:  Pertinent negatives are above in HPI. 10 point ROS otherwise negative.       Past Medical History:   Diagnosis Date    Clotting disorder ([address])     DVT (deep venous thrombosis) (HCC)     Hypertension     Lupus anticoagulant disorder (HCC)     Nocturnal hypoxemia     DEMETRIUS (obstructive sleep apnea)     Oxygen dependent     2 L/M NC at night only

## 2023-10-09 NOTE — OP NOTE
[facility]  Department of Pulmonary, Critical Care and Sleep Medicine  [address] [place] [address]  Department of Internal Medicine  Bronchoscopy Note     DATE OF PROCEDURE: 10/9/2023    INDICATIONS & HISTORY:  Tanner Samaniego is a 52 y.o. male with ARDS post transfusion ? TRALI with history of lupus rule out DAH. The risks, benefits, complications, treatment options and expected outcomes were discussed with the patient and appropriate care givers. The possibilities of reaction to medication, pulmonary aspiration, perforation of an organ, bleeding, failure to diagnose a condition and creating a complication requiring transfusion or operation were discussed with the patient/POA who freely signed the informed consent.         PREOPERATIVE DIAGNOSIS:    [] Lung Nodule,  [] Lung Cancer Evaluation,  [] Adenopathy,  [x] Respiratory Failure,   [] Mucous Plug,  [x] Abnormal Imaging CXR/CT scan   [x] Diffuse lung disease/ARDS    POSTOPERATIVE DIAGNOSES:  [x] Normal Endobronchial Evaluation,   [x] Normal Anatomy,    PROCEDURE PERFORMED:   [x] Fiberoptic Bronchoscopy,   [] Endobronchial Ultrasound  [x] Bronchoalveolar Lavage [BAL] 20 ml x 3 JCARLOS       SURGEON:    Tresa Walker DO, MPH, FCCP, FACP, FACOI    ASSISTANT:    Bronchoscopy Nursing, ICU Team    SEDATION:  []  General Anesthesia [] Regional Anesthesia [x] ICU Protocol Team,       ANESTHESIA:     [] Lidocaine 2% via intranasal instillation,    [] Epinephrine 1:10,000 diluted Endobronchial administration   [x] ICU Sedation Protcol, Paralytic added with gtt    ANESTHESIOLOGIST:  None    SPECIMENS:  [x] Bronchial wash sent for:   [] Cytology   [x] Acid-fast bacillus smear and culture   [x] Fungal smear & culture   [x] Gram stain, C&S   [] DFA & Culture for Legionella    Bronchoalveolar lavage sent for:   [x] Cell Count & differential X3 as labeled  [x] Galactomannan for Aspergillus      ESTIMATED BLOOD LOSS:

## 2023-10-09 NOTE — PROGRESS NOTES
10/09/23 0440   Patient Observation   Pulse (!) 109   Respirations (!) 31   SpO2 (S)  95 %   Vent Information   Vent Mode AC/VC   Ventilator Settings   FiO2  100 %   Vt (Set, mL) 510 mL   Resp Rate (Set) 16 bmp   PEEP/CPAP (cmH2O) 12   Vent Patient Data (Readings)   Vt (Measured) 546 mL   Peak Inspiratory Pressure (cmH2O) 31 cmH2O   Rate Measured 31 br/min   Minute Volume (L/min) 16.8 Liters   Mean Airway Pressure (cmH2O) 19 cmH20   Plateau Pressure (cm H2O) 28 cm H2O   Driving Pressure 16   I:E Ratio 1:1.50   Vent Alarm Settings   High Pressure (cmH2O) 50 cmH2O     Patient spo2 increased to 95% at this time

## 2023-10-09 NOTE — PROGRESS NOTES
Pharmacy Consultation Note  (Antibiotic Dosing and Monitoring)    Initial consult date: 10/9/23  Consulting physician/provider: Dr. Vasiliy Sofia  Drug: Vancomycin  Indication: Empiric    Age/  Gender Height Weight IBW  Allergy Information   53 y.o./male 6' 3\" (190.5 cm) 235 lb (106.6 kg)     Ideal body weight: 84.5 kg (186 lb 4.6 oz)  Adjusted ideal body weight: 93.3 kg (205 lb 12.4 oz)   Patient has no known allergies. Renal Function:  Recent Labs     10/09/23  0308 10/09/23  0813 10/09/23  1459   BUN 37* 39* 43*   CREATININE 2.4* 2.5* 2.9*       Intake/Output Summary (Last 24 hours) at 10/9/2023 1554  Last data filed at 10/9/2023 1400  Gross per 24 hour   Intake 2505.15 ml   Output 3425 ml   Net -919.85 ml       Vancomycin Monitoring:  Trough:  No results for input(s): \"VANCOTROUGH\" in the last 72 hours. Random:  No results for input(s): \"VANCORANDOM\" in the last 72 hours. No results for input(s): \"BLOODCULT2\" in the last 72 hours. Historical Cultures:  No results found for: \"ORG\"  No results for input(s): \"BC\" in the last 72 hours. Vancomycin Administration Times:  Recent vancomycin administrations                     vancomycin (VANCOCIN) 1,500 mg in sodium chloride 0.9 % 500 mL IVPB (mg) 1,500 mg New Bag 10/08/23 0955                    Assessment:  Patient is a 52 y.o. male who has been initiated on vancomycin  Estimated Creatinine Clearance: 38 mL/min (A) (based on SCr of 2.9 mg/dL (H)).     Plan:  Vancomycin 500 mg IV x1      Lesa Andrews, PharmD, BCPS, BCCCP 10/9/2023 3:55 PM

## 2023-10-09 NOTE — PLAN OF CARE
Problem: Discharge Planning  Goal: Discharge to home or other facility with appropriate resources  Outcome: Not Progressing     Problem: Pain  Goal: Verbalizes/displays adequate comfort level or baseline comfort level  Outcome: Not Progressing  Flowsheets (Taken 10/9/2023 0800)  Verbalizes/displays adequate comfort level or baseline comfort level:   Encourage patient to monitor pain and request assistance   Assess pain using appropriate pain scale   Administer analgesics based on type and severity of pain and evaluate response   Implement non-pharmacological measures as appropriate and evaluate response   Notify Licensed Independent Practitioner if interventions unsuccessful or patient reports new pain   Consider cultural and social influences on pain and pain management     Problem: ABCDS Injury Assessment  Goal: Absence of physical injury  Outcome: Progressing     Problem: Safety - Adult  Goal: Free from fall injury  Outcome: Progressing     Problem: Safety - Medical Restraint  Goal: Remains free of injury from restraints (Restraint for Interference with Medical Device)  Description: INTERVENTIONS:  1. Determine that other, less restrictive measures have been tried or would not be effective before applying the restraint  2. Evaluate the patient's condition at the time of restraint application  3. Inform patient/family regarding the reason for restraint  4.  Q2H: Monitor safety, psychosocial status, comfort, nutrition and hydration  Outcome: Progressing  Flowsheets (Taken 10/9/2023 1253)  Remains free of injury from restraints (restraint for interference with medical device):   Evaluate the patient's condition at the time of restraint application   Determine that other, less restrictive measures have been tried or would not be effective before applying the restraint   Inform patient/family regarding the reason for restraint   Every 2 hours: Monitor safety, psychosocial status, comfort, nutrition and hydration

## 2023-10-10 ENCOUNTER — APPOINTMENT (OUTPATIENT)
Dept: GENERAL RADIOLOGY | Age: 53
DRG: 811 | End: 2023-10-10
Payer: COMMERCIAL

## 2023-10-10 LAB
AADO2: 265.8 MMHG
AADO2: 287 MMHG
ALBUMIN SERPL-MCNC: 2.4 G/DL (ref 3.5–5.2)
ALP SERPL-CCNC: 42 U/L (ref 40–129)
ALT SERPL-CCNC: 35 U/L (ref 0–40)
ANION GAP SERPL CALCULATED.3IONS-SCNC: 14 MMOL/L (ref 7–16)
ANION GAP SERPL CALCULATED.3IONS-SCNC: 14 MMOL/L (ref 7–16)
ANION GAP SERPL CALCULATED.3IONS-SCNC: 15 MMOL/L (ref 7–16)
ANION GAP SERPL CALCULATED.3IONS-SCNC: 17 MMOL/L (ref 7–16)
APPEARANCE BRONCH: CLEAR
APPEARANCE BRONCH: CLEAR
ARM BAND NUMBER: NORMAL
AST SERPL-CCNC: 37 U/L (ref 0–39)
B.E.: -5.4 MMOL/L (ref -3–3)
B.E.: -5.4 MMOL/L (ref -3–3)
BASOPHILS # BLD: 0 %
BASOPHILS # BLD: 0 %
BASOPHILS # BLD: 0 K/UL (ref 0–0.2)
BASOPHILS NFR BLD: 0 % (ref 0–2)
BILIRUB SERPL-MCNC: 0.6 MG/DL (ref 0–1.2)
BLOOD BANK BLOOD PRODUCT EXPIRATION DATE: NORMAL
BLOOD BANK BLOOD PRODUCT EXPIRATION DATE: NORMAL
BLOOD BANK DISPENSE STATUS: NORMAL
BLOOD BANK DISPENSE STATUS: NORMAL
BLOOD BANK ISBT PRODUCT BLOOD TYPE: 1700
BLOOD BANK ISBT PRODUCT BLOOD TYPE: 2800
BLOOD BANK PRODUCT CODE: NORMAL
BLOOD BANK PRODUCT CODE: NORMAL
BLOOD BANK UNIT TYPE AND RH: NORMAL
BLOOD BANK UNIT TYPE AND RH: NORMAL
BPU ID: NORMAL
BPU ID: NORMAL
BUN SERPL-MCNC: 50 MG/DL (ref 6–20)
BUN SERPL-MCNC: 54 MG/DL (ref 6–20)
BUN SERPL-MCNC: 61 MG/DL (ref 6–20)
BUN SERPL-MCNC: 69 MG/DL (ref 6–20)
CA-I BLD-SCNC: 1.05 MMOL/L (ref 1.15–1.33)
CA-I BLD-SCNC: 1.13 MMOL/L (ref 1.15–1.33)
CALCIUM SERPL-MCNC: 7.7 MG/DL (ref 8.6–10.2)
CALCIUM SERPL-MCNC: 7.9 MG/DL (ref 8.6–10.2)
CALCIUM SERPL-MCNC: 8.2 MG/DL (ref 8.6–10.2)
CALCIUM SERPL-MCNC: 8.3 MG/DL (ref 8.6–10.2)
CHLORIDE SERPL-SCNC: 100 MMOL/L (ref 98–107)
CHLORIDE SERPL-SCNC: 96 MMOL/L (ref 98–107)
CHLORIDE SERPL-SCNC: 97 MMOL/L (ref 98–107)
CHLORIDE SERPL-SCNC: 99 MMOL/L (ref 98–107)
CLOT ANGLE.KAOLIN INDUCED BLD RES TEG: 44.6 DEG (ref 53–70)
CLOT ANGLE.KAOLIN INDUCED BLD RES TEG: 56 DEG (ref 53–70)
CLOT CHECK: NORMAL
CLOT CHECK: NORMAL
CO2 SERPL-SCNC: 16 MMOL/L (ref 22–29)
CO2 SERPL-SCNC: 19 MMOL/L (ref 22–29)
CO2 SERPL-SCNC: 20 MMOL/L (ref 22–29)
CO2 SERPL-SCNC: 21 MMOL/L (ref 22–29)
COHB: 1 % (ref 0–1.5)
COHB: 1.5 % (ref 0–1.5)
COLOR BRONCH: COLORLESS
COLOR BRONCH: COLORLESS
COLUMNAR EPIS BAL: 0 %
COLUMNAR EPIS BAL: 0 %
COMPONENT: NORMAL
COMPONENT: NORMAL
CREAT SERPL-MCNC: 3.2 MG/DL (ref 0.7–1.2)
CREAT SERPL-MCNC: 3.3 MG/DL (ref 0.7–1.2)
CREAT SERPL-MCNC: 3.9 MG/DL (ref 0.7–1.2)
CREAT SERPL-MCNC: 4.2 MG/DL (ref 0.7–1.2)
CRITICAL: ABNORMAL
CRITICAL: ABNORMAL
DATE ANALYZED: ABNORMAL
DATE ANALYZED: ABNORMAL
DATE LAST DOSE: NORMAL
DATE OF COLLECTION: ABNORMAL
DATE OF COLLECTION: ABNORMAL
EOSINOPHIL # BLD: 0.04 K/UL (ref 0.05–0.5)
EOSINOPHILS BAL: 0 %
EOSINOPHILS BAL: 0 %
EOSINOPHILS RELATIVE PERCENT: 1 % (ref 0–6)
EPL-TEG: 0 % (ref 0–15)
EPL-TEG: 0 % (ref 0–15)
ERYTHROCYTE [DISTWIDTH] IN BLOOD BY AUTOMATED COUNT: 18.1 % (ref 11.5–15)
FIBRINOGEN PPP-MCNC: 602 MG/DL (ref 200–400)
FIBRINOGEN PPP-MCNC: 657 MG/DL (ref 200–400)
FIO2: 60 %
FIO2: 60 %
G-TEG: 10.7 KDYN/CM2 (ref 4.5–11)
G-TEG: 15 KDYN/CM2 (ref 4.5–11)
GFR SERPL CREATININE-BSD FRML MDRD: 16 ML/MIN/1.73M2
GFR SERPL CREATININE-BSD FRML MDRD: 18 ML/MIN/1.73M2
GFR SERPL CREATININE-BSD FRML MDRD: 21 ML/MIN/1.73M2
GFR SERPL CREATININE-BSD FRML MDRD: 22 ML/MIN/1.73M2
GLUCOSE BLD-MCNC: 200 MG/DL (ref 74–99)
GLUCOSE BLD-MCNC: 202 MG/DL (ref 74–99)
GLUCOSE BLD-MCNC: 204 MG/DL (ref 74–99)
GLUCOSE BLD-MCNC: 206 MG/DL (ref 74–99)
GLUCOSE BLD-MCNC: 215 MG/DL (ref 74–99)
GLUCOSE BLD-MCNC: 218 MG/DL (ref 74–99)
GLUCOSE BLD-MCNC: 219 MG/DL (ref 74–99)
GLUCOSE SERPL-MCNC: 192 MG/DL (ref 74–99)
GLUCOSE SERPL-MCNC: 200 MG/DL (ref 74–99)
GLUCOSE SERPL-MCNC: 202 MG/DL (ref 74–99)
GLUCOSE SERPL-MCNC: 211 MG/DL (ref 74–99)
HCO3: 19.6 MMOL/L (ref 22–26)
HCO3: 19.7 MMOL/L (ref 22–26)
HCT VFR BLD AUTO: 21.8 % (ref 37–54)
HCT VFR BLD AUTO: 22.5 % (ref 37–54)
HCT VFR BLD AUTO: 22.5 % (ref 37–54)
HCT VFR BLD AUTO: 23.8 % (ref 37–54)
HGB BLD-MCNC: 7.1 G/DL (ref 12.5–16.5)
HGB BLD-MCNC: 7.2 G/DL (ref 12.5–16.5)
HGB BLD-MCNC: 7.3 G/DL (ref 12.5–16.5)
HGB BLD-MCNC: 7.7 G/DL (ref 12.5–16.5)
HHB: 2.5 % (ref 0–5)
HHB: 4.7 % (ref 0–5)
INR PPP: 1.2
KINETICS TEG: 2.8 MIN (ref 1–3)
KINETICS TEG: 4.8 MIN (ref 1–3)
LAB: ABNORMAL
LAB: ABNORMAL
LY30 (LYSIS) TEG: 0 % (ref 0–8)
LY30 (LYSIS) TEG: 0 % (ref 0–8)
LYMPHOCYTES NFR BLD: 0.2 K/UL (ref 1.5–4)
LYMPHOCYTES RELATIVE PERCENT: 4 % (ref 20–42)
LYMPHOCYTES, BAL: 0 %
LYMPHOCYTES, BAL: 0 %
Lab: 1049
Lab: 422
MA (MAX CLOT) TEG: 68.1 MM (ref 50–70)
MA (MAX CLOT) TEG: 75 MM (ref 50–70)
MACROPHAGES, BAL: 0 %
MACROPHAGES, BAL: 0 %
MAGNESIUM SERPL-MCNC: 2.3 MG/DL (ref 1.6–2.6)
MCH RBC QN AUTO: 26.4 PG (ref 26–35)
MCHC RBC AUTO-ENTMCNC: 32.6 G/DL (ref 32–34.5)
MCV RBC AUTO: 81 FL (ref 80–99.9)
METHB: 0.4 % (ref 0–1.5)
METHB: 0.5 % (ref 0–1.5)
MODE: AC
MODE: AC
MONOCYTES NFR BLD: 0.04 K/UL (ref 0.1–0.95)
MONOCYTES NFR BLD: 1 % (ref 2–12)
NEUTROPHILS NFR BLD: 94 % (ref 43–80)
NEUTS SEG NFR BLD: 4.23 K/UL (ref 1.8–7.3)
NUCLEATED RED BLOOD CELLS: 2 PER 100 WBC
O2 CONTENT: 11.6 ML/DL
O2 CONTENT: 12 ML/DL
O2 SATURATION: 95.2 % (ref 92–98.5)
O2 SATURATION: 97.4 % (ref 92–98.5)
O2HB: 93.9 % (ref 94–97)
O2HB: 95.5 % (ref 94–97)
OPERATOR ID: 1868
OPERATOR ID: 914
PARTIAL THROMBOPLASTIN TIME: 45.9 SEC (ref 24.5–35.1)
PATIENT TEMP: 37 C
PATIENT TEMP: 37 C
PCO2: 35.9 MMHG (ref 35–45)
PCO2: 36.7 MMHG (ref 35–45)
PEEP/CPAP: 12 CMH2O
PEEP/CPAP: 12 CMH2O
PFO2: 1.42 MMHG/%
PFO2: 1.79 MMHG/%
PH BLOOD GAS: 7.35 (ref 7.35–7.45)
PH BLOOD GAS: 7.36 (ref 7.35–7.45)
PHOSPHATE SERPL-MCNC: 6.1 MG/DL (ref 2.5–4.5)
PLATELET # BLD AUTO: 54 K/UL (ref 130–450)
PLATELET CONFIRMATION: NORMAL
PMV BLD AUTO: 11.2 FL (ref 7–12)
PO2: 107.5 MMHG (ref 75–100)
PO2: 85.4 MMHG (ref 75–100)
POTASSIUM SERPL-SCNC: 4.6 MMOL/L (ref 3.5–5)
POTASSIUM SERPL-SCNC: 4.9 MMOL/L (ref 3.5–5)
POTASSIUM SERPL-SCNC: 5 MMOL/L (ref 3.5–5)
POTASSIUM SERPL-SCNC: 5.1 MMOL/L (ref 3.5–5)
PROT SERPL-MCNC: 5.6 G/DL (ref 6.4–8.3)
PROTHROMBIN TIME: 12.8 SEC (ref 9.3–12.4)
RBC # BLD AUTO: 2.69 M/UL (ref 3.8–5.8)
RBC # BLD: ABNORMAL 10*6/UL
RBC, BAL: 7 CELLS/UL
RBC, BAL: 8 CELLS/UL
REACTION TIME TEG: 10.5 MIN (ref 5–10)
REACTION TIME TEG: 11.6 MIN (ref 5–10)
RI(T): 2.47
RI(T): 3.36
RR MECHANICAL: 22 B/MIN
RR MECHANICAL: 22 B/MIN
SEGMENTED NEUTROPHILS, BAL: 0 %
SEGMENTED NEUTROPHILS, BAL: 0 %
SODIUM SERPL-SCNC: 129 MMOL/L (ref 132–146)
SODIUM SERPL-SCNC: 130 MMOL/L (ref 132–146)
SODIUM SERPL-SCNC: 134 MMOL/L (ref 132–146)
SODIUM SERPL-SCNC: 135 MMOL/L (ref 132–146)
SOURCE, BLOOD GAS: ABNORMAL
SOURCE, BLOOD GAS: ABNORMAL
SPECIMEN TYPE: NORMAL
SPECIMEN TYPE: NORMAL
SURGICAL PATHOLOGY REPORT: NORMAL
THB: 8.5 G/DL (ref 11.5–16.5)
THB: 9 G/DL (ref 11.5–16.5)
TIME ANALYZED: 1058
TIME ANALYZED: 433
TME LAST DOSE: NORMAL H
TOTAL CELLS COUNTED BRONCH: 12 CELLS/UL
TOTAL CELLS COUNTED BRONCH: 19 CELLS/UL
TRANSFUSION STATUS: NORMAL
TRANSFUSION STATUS: NORMAL
UNIDENT CELLS NFR FLD: 0 %
UNIDENT CELLS NFR FLD: 0 %
UNIT DIVISION: 0
UNIT DIVISION: 0
UNIT ISSUE DATE/TIME: NORMAL
UNIT ISSUE DATE/TIME: NORMAL
VANCOMYCIN DOSE: NORMAL MG
VANCOMYCIN SERPL-MCNC: 11.7 UG/ML (ref 5–40)
VT MECHANICAL: 450 ML
VT MECHANICAL: 450 ML
WBC OTHER # BLD: 4.5 K/UL (ref 4.5–11.5)

## 2023-10-10 PROCEDURE — 6360000002 HC RX W HCPCS

## 2023-10-10 PROCEDURE — 80202 ASSAY OF VANCOMYCIN: CPT

## 2023-10-10 PROCEDURE — 36430 TRANSFUSION BLD/BLD COMPNT: CPT

## 2023-10-10 PROCEDURE — 85347 COAGULATION TIME ACTIVATED: CPT

## 2023-10-10 PROCEDURE — 2500000003 HC RX 250 WO HCPCS

## 2023-10-10 PROCEDURE — 85018 HEMOGLOBIN: CPT

## 2023-10-10 PROCEDURE — A4216 STERILE WATER/SALINE, 10 ML: HCPCS | Performed by: STUDENT IN AN ORGANIZED HEALTH CARE EDUCATION/TRAINING PROGRAM

## 2023-10-10 PROCEDURE — 2500000003 HC RX 250 WO HCPCS: Performed by: INTERNAL MEDICINE

## 2023-10-10 PROCEDURE — 6360000002 HC RX W HCPCS: Performed by: STUDENT IN AN ORGANIZED HEALTH CARE EDUCATION/TRAINING PROGRAM

## 2023-10-10 PROCEDURE — 84100 ASSAY OF PHOSPHORUS: CPT

## 2023-10-10 PROCEDURE — 6360000004 HC RX CONTRAST MEDICATION: Performed by: STUDENT IN AN ORGANIZED HEALTH CARE EDUCATION/TRAINING PROGRAM

## 2023-10-10 PROCEDURE — 82805 BLOOD GASES W/O2 SATURATION: CPT

## 2023-10-10 PROCEDURE — 2580000003 HC RX 258: Performed by: STUDENT IN AN ORGANIZED HEALTH CARE EDUCATION/TRAINING PROGRAM

## 2023-10-10 PROCEDURE — 94640 AIRWAY INHALATION TREATMENT: CPT

## 2023-10-10 PROCEDURE — P9017 PLASMA 1 DONOR FRZ W/IN 8 HR: HCPCS

## 2023-10-10 PROCEDURE — 6360000002 HC RX W HCPCS: Performed by: INTERNAL MEDICINE

## 2023-10-10 PROCEDURE — 85025 COMPLETE CBC W/AUTO DIFF WBC: CPT

## 2023-10-10 PROCEDURE — 94003 VENT MGMT INPAT SUBQ DAY: CPT

## 2023-10-10 PROCEDURE — 2580000003 HC RX 258

## 2023-10-10 PROCEDURE — 86927 PLASMA FRESH FROZEN: CPT

## 2023-10-10 PROCEDURE — 85390 FIBRINOLYSINS SCREEN I&R: CPT

## 2023-10-10 PROCEDURE — 2000000000 HC ICU R&B

## 2023-10-10 PROCEDURE — 85576 BLOOD PLATELET AGGREGATION: CPT

## 2023-10-10 PROCEDURE — 85014 HEMATOCRIT: CPT

## 2023-10-10 PROCEDURE — 80053 COMPREHEN METABOLIC PANEL: CPT

## 2023-10-10 PROCEDURE — 93306 TTE W/DOPPLER COMPLETE: CPT

## 2023-10-10 PROCEDURE — 71045 X-RAY EXAM CHEST 1 VIEW: CPT

## 2023-10-10 PROCEDURE — C9113 INJ PANTOPRAZOLE SODIUM, VIA: HCPCS | Performed by: STUDENT IN AN ORGANIZED HEALTH CARE EDUCATION/TRAINING PROGRAM

## 2023-10-10 PROCEDURE — 83735 ASSAY OF MAGNESIUM: CPT

## 2023-10-10 PROCEDURE — 6370000000 HC RX 637 (ALT 250 FOR IP)

## 2023-10-10 PROCEDURE — 85730 THROMBOPLASTIN TIME PARTIAL: CPT

## 2023-10-10 PROCEDURE — 80048 BASIC METABOLIC PNL TOTAL CA: CPT

## 2023-10-10 PROCEDURE — 82962 GLUCOSE BLOOD TEST: CPT

## 2023-10-10 PROCEDURE — 85610 PROTHROMBIN TIME: CPT

## 2023-10-10 PROCEDURE — P9012 CRYOPRECIPITATE EACH UNIT: HCPCS

## 2023-10-10 PROCEDURE — 85384 FIBRINOGEN ACTIVITY: CPT

## 2023-10-10 PROCEDURE — 6370000000 HC RX 637 (ALT 250 FOR IP): Performed by: STUDENT IN AN ORGANIZED HEALTH CARE EDUCATION/TRAINING PROGRAM

## 2023-10-10 PROCEDURE — 82330 ASSAY OF CALCIUM: CPT

## 2023-10-10 RX ORDER — FENTANYL CITRATE-0.9 % NACL/PF 10 MCG/ML
25-200 PLASTIC BAG, INJECTION (ML) INTRAVENOUS CONTINUOUS
Status: DISCONTINUED | OUTPATIENT
Start: 2023-10-10 | End: 2023-10-13 | Stop reason: HOSPADM

## 2023-10-10 RX ORDER — SODIUM CHLORIDE 9 MG/ML
INJECTION, SOLUTION INTRAVENOUS PRN
Status: DISCONTINUED | OUTPATIENT
Start: 2023-10-10 | End: 2023-10-11

## 2023-10-10 RX ORDER — CALCIUM GLUCONATE 10 MG/ML
1000 INJECTION, SOLUTION INTRAVENOUS ONCE
Status: COMPLETED | OUTPATIENT
Start: 2023-10-10 | End: 2023-10-10

## 2023-10-10 RX ORDER — DIMETHICONE, OXYBENZONE, AND PADIMATE O 2; 2.5; 6.6 G/100G; G/100G; G/100G
STICK TOPICAL PRN
Status: DISCONTINUED | OUTPATIENT
Start: 2023-10-10 | End: 2023-10-13 | Stop reason: HOSPADM

## 2023-10-10 RX ORDER — MIDAZOLAM HYDROCHLORIDE 1 MG/ML
1-10 INJECTION, SOLUTION INTRAVENOUS CONTINUOUS
Status: DISCONTINUED | OUTPATIENT
Start: 2023-10-10 | End: 2023-10-13 | Stop reason: HOSPADM

## 2023-10-10 RX ORDER — AMIODARONE HYDROCHLORIDE 200 MG/1
400 TABLET ORAL 2 TIMES DAILY
Status: DISCONTINUED | OUTPATIENT
Start: 2023-10-10 | End: 2023-10-13 | Stop reason: HOSPADM

## 2023-10-10 RX ADMIN — LEVETIRACETAM 500 MG: 100 INJECTION, SOLUTION INTRAVENOUS at 11:50

## 2023-10-10 RX ADMIN — BUDESONIDE 500 MCG: 0.5 INHALANT RESPIRATORY (INHALATION) at 09:07

## 2023-10-10 RX ADMIN — ARFORMOTEROL TARTRATE 15 MCG: 15 SOLUTION RESPIRATORY (INHALATION) at 20:36

## 2023-10-10 RX ADMIN — Medication 175 MCG/HR: at 06:03

## 2023-10-10 RX ADMIN — POLYVINYL ALCOHOL 1 DROP: 14 SOLUTION/ DROPS OPHTHALMIC at 14:50

## 2023-10-10 RX ADMIN — POLYVINYL ALCOHOL 1 DROP: 14 SOLUTION/ DROPS OPHTHALMIC at 15:49

## 2023-10-10 RX ADMIN — POLYVINYL ALCOHOL 1 DROP: 14 SOLUTION/ DROPS OPHTHALMIC at 04:05

## 2023-10-10 RX ADMIN — POLYVINYL ALCOHOL 1 DROP: 14 SOLUTION/ DROPS OPHTHALMIC at 10:10

## 2023-10-10 RX ADMIN — INSULIN LISPRO 1 UNITS: 100 INJECTION, SOLUTION INTRAVENOUS; SUBCUTANEOUS at 00:45

## 2023-10-10 RX ADMIN — POLYVINYL ALCOHOL 1 DROP: 14 SOLUTION/ DROPS OPHTHALMIC at 02:06

## 2023-10-10 RX ADMIN — INSULIN LISPRO 1 UNITS: 100 INJECTION, SOLUTION INTRAVENOUS; SUBCUTANEOUS at 23:35

## 2023-10-10 RX ADMIN — POLYVINYL ALCOHOL 1 DROP: 14 SOLUTION/ DROPS OPHTHALMIC at 08:02

## 2023-10-10 RX ADMIN — INSULIN LISPRO 1 UNITS: 100 INJECTION, SOLUTION INTRAVENOUS; SUBCUTANEOUS at 20:49

## 2023-10-10 RX ADMIN — METHYLPREDNISOLONE SODIUM SUCCINATE 250 MG: 125 INJECTION, POWDER, LYOPHILIZED, FOR SOLUTION INTRAMUSCULAR; INTRAVENOUS at 05:21

## 2023-10-10 RX ADMIN — Medication 10 ML: at 20:50

## 2023-10-10 RX ADMIN — POLYVINYL ALCOHOL 1 DROP: 14 SOLUTION/ DROPS OPHTHALMIC at 20:53

## 2023-10-10 RX ADMIN — PIPERACILLIN AND TAZOBACTAM 4500 MG: 4; .5 INJECTION, POWDER, LYOPHILIZED, FOR SOLUTION INTRAVENOUS at 22:07

## 2023-10-10 RX ADMIN — POLYVINYL ALCOHOL 1 DROP: 14 SOLUTION/ DROPS OPHTHALMIC at 23:36

## 2023-10-10 RX ADMIN — BUDESONIDE 500 MCG: 0.5 INHALANT RESPIRATORY (INHALATION) at 20:36

## 2023-10-10 RX ADMIN — Medication 5000 UNITS: at 08:12

## 2023-10-10 RX ADMIN — PIPERACILLIN AND TAZOBACTAM 4500 MG: 4; .5 INJECTION, POWDER, LYOPHILIZED, FOR SOLUTION INTRAVENOUS at 06:32

## 2023-10-10 RX ADMIN — Medication 9 MG/HR: at 15:46

## 2023-10-10 RX ADMIN — CALCIUM GLUCONATE 1000 MG: 10 INJECTION, SOLUTION INTRAVENOUS at 05:20

## 2023-10-10 RX ADMIN — CHLORHEXIDINE GLUCONATE 15 ML: 1.2 RINSE ORAL at 08:02

## 2023-10-10 RX ADMIN — POLYVINYL ALCOHOL 1 DROP: 14 SOLUTION/ DROPS OPHTHALMIC at 12:19

## 2023-10-10 RX ADMIN — Medication 10 ML: at 08:03

## 2023-10-10 RX ADMIN — Medication 150 MCG/HR: at 12:57

## 2023-10-10 RX ADMIN — PANTOPRAZOLE SODIUM 40 MG: 40 INJECTION, POWDER, FOR SOLUTION INTRAVENOUS at 08:12

## 2023-10-10 RX ADMIN — POLYVINYL ALCOHOL 1 DROP: 14 SOLUTION/ DROPS OPHTHALMIC at 05:20

## 2023-10-10 RX ADMIN — PIPERACILLIN AND TAZOBACTAM 4500 MG: 4; .5 INJECTION, POWDER, LYOPHILIZED, FOR SOLUTION INTRAVENOUS at 14:49

## 2023-10-10 RX ADMIN — INSULIN LISPRO 1 UNITS: 100 INJECTION, SOLUTION INTRAVENOUS; SUBCUTANEOUS at 04:05

## 2023-10-10 RX ADMIN — INSULIN LISPRO 1 UNITS: 100 INJECTION, SOLUTION INTRAVENOUS; SUBCUTANEOUS at 16:01

## 2023-10-10 RX ADMIN — Medication 175 MCG/HR: at 18:48

## 2023-10-10 RX ADMIN — LEVETIRACETAM 500 MG: 100 INJECTION, SOLUTION INTRAVENOUS at 23:35

## 2023-10-10 RX ADMIN — CHLORHEXIDINE GLUCONATE 15 ML: 1.2 RINSE ORAL at 20:49

## 2023-10-10 RX ADMIN — INSULIN LISPRO 1 UNITS: 100 INJECTION, SOLUTION INTRAVENOUS; SUBCUTANEOUS at 11:49

## 2023-10-10 RX ADMIN — AMIODARONE HYDROCHLORIDE 400 MG: 200 TABLET ORAL at 20:49

## 2023-10-10 RX ADMIN — POLYVINYL ALCOHOL 1 DROP: 14 SOLUTION/ DROPS OPHTHALMIC at 22:02

## 2023-10-10 RX ADMIN — Medication 200 MCG/HR: at 00:22

## 2023-10-10 RX ADMIN — ARFORMOTEROL TARTRATE 15 MCG: 15 SOLUTION RESPIRATORY (INHALATION) at 09:07

## 2023-10-10 RX ADMIN — Medication 10 MG/HR: at 02:04

## 2023-10-10 RX ADMIN — PERFLUTREN 1.5 ML: 6.52 INJECTION, SUSPENSION INTRAVENOUS at 15:55

## 2023-10-10 RX ADMIN — INSULIN LISPRO 1 UNITS: 100 INJECTION, SOLUTION INTRAVENOUS; SUBCUTANEOUS at 08:13

## 2023-10-10 ASSESSMENT — PULMONARY FUNCTION TESTS
PIF_VALUE: 32
PIF_VALUE: 29
PIF_VALUE: 28
PIF_VALUE: 28
PIF_VALUE: 31
PIF_VALUE: 28
PIF_VALUE: 31
PIF_VALUE: 29
PIF_VALUE: 29
PIF_VALUE: 28
PIF_VALUE: 30
PIF_VALUE: 31
PIF_VALUE: 29
PIF_VALUE: 28
PIF_VALUE: 28
PIF_VALUE: 31
PIF_VALUE: 28
PIF_VALUE: 28
PIF_VALUE: 29
PIF_VALUE: 30
PIF_VALUE: 29
PIF_VALUE: 25
PIF_VALUE: 29
PIF_VALUE: 28
PIF_VALUE: 29
PIF_VALUE: 28

## 2023-10-10 ASSESSMENT — PAIN SCALES - GENERAL
PAINLEVEL_OUTOF10: 0
PAINLEVEL_OUTOF10: 0
PAINLEVEL_OUTOF10: 2

## 2023-10-10 NOTE — PROGRESS NOTES
DAILY VENTILATOR WEANING ASSESSMENT PERFORMED    P/FIO2 Ratio =   179       (<100= do not Wean)                  Cs = 37                        (<32= Instability)  Plat. Pressure = 25  MV = 10.2  RSBI =    Instabilities:       Cardiovascular = 1       CNS = 2       Respiratory = 1       Metabolic = 2    Parameters    no    Wean per protocol  no    Ask Physician for a weaning plan yes    Additional Comments:     Performed by Braxton Brewer RCP RRT      Reference Table:    Cardiovascular     CNS      1. Mean BP less than or equal to 75   1. Neuromuscular blockade  2. Heart Rate greater than 130   2. RASS of -3, -4, -5  3. Myocardial Ischemia    3. RASS of +3, +4  4. Mechanical Assist Device    4. ICP greater than 15 or             Intracranial Hypertension         Respiratory      Metabolic  1. PEEP equal to or greater than 10cm/H20  1. Temp. (8hrs) less than 95 or > 103  2. Respiratory Rate greater than 35   2. WBC < 5000 or > 78599  3. Minute Volume greater than 15L  4. pH less than 7.30  5.  Deteriorating chest X-ray

## 2023-10-10 NOTE — PROGRESS NOTES
Per RN, doctor would like CT scans done tomorrow,10/11/23. Please call [phone] if anything changes.

## 2023-10-10 NOTE — PROGRESS NOTES
Pharmacy Consultation Note  (Antibiotic Dosing and Monitoring)    Initial consult date: 10/9/23  Consulting physician/provider: Dr. Charisma Duong  Drug: Vancomycin  Indication: Empiric    Vancomycin has been discontinued. Clinical pharmacy will sign off, please reconsult if further assistance is needed.        Christian Kelly PharmD, BCPS, BCCCP 10/10/2023 11:01 AM

## 2023-10-10 NOTE — PLAN OF CARE
Problem: Discharge Planning  Goal: Discharge to home or other facility with appropriate resources  10/10/2023 0607 by Becki Rivera RN  Outcome: Progressing  10/9/2023 1831 by Maria E Arreola RN  Outcome: Not Progressing     Problem: Pain  Goal: Verbalizes/displays adequate comfort level or baseline comfort level  10/10/2023 0607 by Becki Rivera RN  Outcome: Progressing  Flowsheets (Taken 10/10/2023 0400)  Verbalizes/displays adequate comfort level or baseline comfort level: Encourage patient to monitor pain and request assistance  10/9/2023 1831 by Maria E Arreola RN  Outcome: Not Progressing  Flowsheets (Taken 10/9/2023 0800)  Verbalizes/displays adequate comfort level or baseline comfort level:   Encourage patient to monitor pain and request assistance   Assess pain using appropriate pain scale   Administer analgesics based on type and severity of pain and evaluate response   Implement non-pharmacological measures as appropriate and evaluate response   Notify Licensed Independent Practitioner if interventions unsuccessful or patient reports new pain   Consider cultural and social influences on pain and pain management     Problem: ABCDS Injury Assessment  Goal: Absence of physical injury  10/10/2023 0607 by Becki Rivera RN  Outcome: Progressing  10/9/2023 1831 by Maria E Arreola RN  Outcome: Progressing     Problem: Safety - Adult  Goal: Free from fall injury  10/10/2023 0607 by Becki Rivera RN  Outcome: Progressing  10/9/2023 1831 by Maria E Arreola RN  Outcome: Progressing     Problem: Safety - Medical Restraint  Goal: Remains free of injury from restraints (Restraint for Interference with Medical Device)  Description: INTERVENTIONS:  1. Determine that other, less restrictive measures have been tried or would not be effective before applying the restraint  2. Evaluate the patient's condition at the time of restraint application  3. Inform patient/family regarding the reason for restraint  4.  Q2H: Monitor safety, psychosocial status, comfort, nutrition and hydration  10/10/2023 0607 by Melvina Pozo RN  Outcome: Progressing  10/9/2023 1831 by Nii Bee RN  Outcome: Progressing  Flowsheets (Taken 10/9/2023 1253)  Remains free of injury from restraints (restraint for interference with medical device):   Evaluate the patient's condition at the time of restraint application   Determine that other, less restrictive measures have been tried or would not be effective before applying the restraint   Inform patient/family regarding the reason for restraint   Every 2 hours: Monitor safety, psychosocial status, comfort, nutrition and hydration

## 2023-10-10 NOTE — PLAN OF CARE
Problem: Safety - Adult  Goal: Free from fall injury  10/10/2023 1832 by Cara Stephens, RN  Outcome: Progressing  Problem: Safety - Medical Restraint  Goal: Remains free of injury from restraints (Restraint for Interference with Medical Device)  Description: INTERVENTIONS:  1. Determine that other, less restrictive measures have been tried or would not be effective before applying the restraint  2. Evaluate the patient's condition at the time of restraint application  3. Inform patient/family regarding the reason for restraint  4. Q2H: Monitor safety, psychosocial status, comfort, nutrition and hydration  10/10/2023 1832 by Cara Stephens, RN  Outcome: Progressing     Problem: Skin/Tissue Integrity  Goal: Absence of new skin breakdown  Description: 1. Monitor for areas of redness and/or skin breakdown  2. Assess vascular access sites hourly  3. Every 4-6 hours minimum:  Change oxygen saturation probe site  4. Every 4-6 hours:  If on nasal continuous positive airway pressure, respiratory therapy assess nares and determine need for appliance change or resting period.   Outcome: Progressing

## 2023-10-10 NOTE — PROGRESS NOTES
10/10/2023 4:16 PM  Veronique Ferrell  [id number]    Subjective:    Remains in the ICU  Intubated  Wife at bedside   Had PRBC last night     Review of Systems  Unable to obtain due to intubated       Scheduled Meds:   amiodarone  400 mg Oral BID    chlorhexidine  15 mL Mouth/Throat BID    fentanNYL  100 mcg IntraVENous Once    piperacillin-tazobactam  4,500 mg IntraVENous Q8H    pantoprazole (PROTONIX) 40 mg in sodium chloride (PF) 0.9 % 10 mL injection  40 mg IntraVENous Daily    levETIRAcetam  500 mg IntraVENous Q12H    polyvinyl alcohol  1 drop Both Eyes Q2H    arformoterol tartrate  15 mcg Nebulization BID RT    budesonide  0.5 mg Nebulization BID RT    insulin lispro  0-4 Units SubCUTAneous Q4H    [START ON 10/11/2023] dexamethasone  20 mg IntraVENous Q24H    Followed by    Nakia Arroyo ON 10/15/2023] dexamethasone  10 mg IntraVENous Q24H    acetaminophen  650 mg Rectal Once    [Held by provider] metoprolol succinate  100 mg Oral Daily    [Held by provider] amLODIPine  5 mg Oral Daily    [Held by provider] labetalol  10 mg IntraVENous Q6H    [Held by provider] Brivaracetam  50 mg Oral BID    Vitamin D  5,000 Units Oral Every Other Day    sodium chloride flush  5-40 mL IntraVENous 2 times per day       Objective:  Vitals:    10/10/23 1600   BP:    Pulse: (!) 113   Resp: 22   Temp:    SpO2: 97%         Allergies: Patient has no known allergies.     General Appearance: eyes closed, intubated   Skin: no rash or erythema  Head: normocephalic and atraumatic, ETT present   Pulmonary/Chest: normal air movement, assisted on ventilator   Abdomen: soft, non-tender, non-distended  Genitourinary: alvarado with renzo urine   Extremities: no cyanosis, clubbing or edema         Labs:     Recent Labs     10/10/23  0419   *   K 5.0   CL 97*   CO2 16*   BUN 54*   CREATININE 3.3*   GLUCOSE 192*   CALCIUM 7.7*       Lab Results   Component Value Date/Time    HGB 7.7 10/10/2023 10:41 AM    HCT 23.8 10/10/2023 10:41 AM       No results found for: \"PSA\"      Assessment/Plan:  Right Perirenal hematoma following renal biopsy    Continue the ICU care  Continue to watch the hemoglobin   Had IR embolization 2 days ago   Wife present and discussed urology role   Continue to watch the creatinine   Nephrology following   US of abdomen pending   CT being planned for tomorrow   Will follow       TERRIE Pro - CNP   [facility]  Urology

## 2023-10-10 NOTE — CARE COORDINATION
Care Coordination LOS 2 day. Remains Intubated, sbt per respiratory. Fentanyl, Versed, Zosyn. Met with wife at bedside to discuss transition of care. Plan is home at discharge. pt independent pta. Pt follows at [place], [place] and with Dr Daisy Ambrose. [facility] at [facility]. She will transport home at discharge. Does not anticipate any home going needs.     Electronically signed by Yordy Lockett RN on 10/10/2023 at 10:32 AM

## 2023-10-11 ENCOUNTER — APPOINTMENT (OUTPATIENT)
Dept: GENERAL RADIOLOGY | Age: 53
DRG: 811 | End: 2023-10-11
Payer: COMMERCIAL

## 2023-10-11 ENCOUNTER — APPOINTMENT (OUTPATIENT)
Dept: ULTRASOUND IMAGING | Age: 53
DRG: 811 | End: 2023-10-11
Payer: COMMERCIAL

## 2023-10-11 ENCOUNTER — APPOINTMENT (OUTPATIENT)
Dept: CT IMAGING | Age: 53
DRG: 811 | End: 2023-10-11
Payer: COMMERCIAL

## 2023-10-11 LAB
AADO2: 211.3 MMHG
ANION GAP SERPL CALCULATED.3IONS-SCNC: 15 MMOL/L (ref 7–16)
ANION GAP SERPL CALCULATED.3IONS-SCNC: 16 MMOL/L (ref 7–16)
ARM BAND NUMBER: NORMAL
B.E.: -3.6 MMOL/L (ref -3–3)
BASOPHILS # BLD: 0 K/UL (ref 0–0.2)
BASOPHILS NFR BLD: 0 % (ref 0–2)
BLOOD BANK BLOOD PRODUCT EXPIRATION DATE: NORMAL
BLOOD BANK DISPENSE STATUS: NORMAL
BLOOD BANK ISBT PRODUCT BLOOD TYPE: 2800
BLOOD BANK ISBT PRODUCT BLOOD TYPE: 2800
BLOOD BANK ISBT PRODUCT BLOOD TYPE: 7300
BLOOD BANK ISBT PRODUCT BLOOD TYPE: 7300
BLOOD BANK PRODUCT CODE: NORMAL
BLOOD BANK UNIT TYPE AND RH: NORMAL
BPU ID: NORMAL
BUN SERPL-MCNC: 72 MG/DL (ref 6–20)
BUN SERPL-MCNC: 81 MG/DL (ref 6–20)
CA-I BLD-SCNC: 1.13 MMOL/L (ref 1.15–1.33)
CALCIUM SERPL-MCNC: 8.1 MG/DL (ref 8.6–10.2)
CALCIUM SERPL-MCNC: 8.1 MG/DL (ref 8.6–10.2)
CHLORIDE SERPL-SCNC: 98 MMOL/L (ref 98–107)
CHLORIDE SERPL-SCNC: 99 MMOL/L (ref 98–107)
CO2 SERPL-SCNC: 18 MMOL/L (ref 22–29)
CO2 SERPL-SCNC: 20 MMOL/L (ref 22–29)
COHB: 1.5 % (ref 0–1.5)
COMPONENT: NORMAL
CREAT SERPL-MCNC: 4.3 MG/DL (ref 0.7–1.2)
CREAT SERPL-MCNC: 4.5 MG/DL (ref 0.7–1.2)
CRITICAL: ABNORMAL
DATE ANALYZED: ABNORMAL
DATE OF COLLECTION: ABNORMAL
EOSINOPHIL # BLD: 0 K/UL (ref 0.05–0.5)
EOSINOPHILS RELATIVE PERCENT: 0 % (ref 0–6)
ERYTHROCYTE [DISTWIDTH] IN BLOOD BY AUTOMATED COUNT: 18.3 % (ref 11.5–15)
FIO2: 50 %
GFR SERPL CREATININE-BSD FRML MDRD: 15 ML/MIN/1.73M2
GFR SERPL CREATININE-BSD FRML MDRD: 16 ML/MIN/1.73M2
GLUCOSE BLD-MCNC: 197 MG/DL (ref 74–99)
GLUCOSE BLD-MCNC: 205 MG/DL (ref 74–99)
GLUCOSE BLD-MCNC: 209 MG/DL (ref 74–99)
GLUCOSE BLD-MCNC: 210 MG/DL (ref 74–99)
GLUCOSE BLD-MCNC: 211 MG/DL (ref 74–99)
GLUCOSE SERPL-MCNC: 189 MG/DL (ref 74–99)
GLUCOSE SERPL-MCNC: 199 MG/DL (ref 74–99)
HCO3: 20.5 MMOL/L (ref 22–26)
HCT VFR BLD AUTO: 22.3 % (ref 37–54)
HCT VFR BLD AUTO: 22.9 % (ref 37–54)
HCT VFR BLD AUTO: 23.8 % (ref 37–54)
HGB BLD-MCNC: 7.2 G/DL (ref 12.5–16.5)
HGB BLD-MCNC: 7.6 G/DL (ref 12.5–16.5)
HGB BLD-MCNC: 7.6 G/DL (ref 12.5–16.5)
HHB: 3.3 % (ref 0–5)
LAB: ABNORMAL
LYMPHOCYTES NFR BLD: 0.29 K/UL (ref 1.5–4)
LYMPHOCYTES RELATIVE PERCENT: 4 % (ref 20–42)
Lab: 428
MAGNESIUM SERPL-MCNC: 2.6 MG/DL (ref 1.6–2.6)
MCH RBC QN AUTO: 26 PG (ref 26–35)
MCHC RBC AUTO-ENTMCNC: 32.3 G/DL (ref 32–34.5)
MCV RBC AUTO: 80.5 FL (ref 80–99.9)
METHB: 0.3 % (ref 0–1.5)
MODE: AC
MONOCYTES NFR BLD: 0.29 K/UL (ref 0.1–0.95)
MONOCYTES NFR BLD: 4 % (ref 2–12)
NEUTROPHILS NFR BLD: 93 % (ref 43–80)
NEUTS SEG NFR BLD: 7.63 K/UL (ref 1.8–7.3)
NUCLEATED RED BLOOD CELLS: 7 PER 100 WBC
O2 CONTENT: 11.8 ML/DL
O2 SATURATION: 96.6 % (ref 92–98.5)
O2HB: 94.9 % (ref 94–97)
OPERATOR ID: ABNORMAL
PARTIAL THROMBOPLASTIN TIME: 34.3 SEC (ref 24.5–35.1)
PARTIAL THROMBOPLASTIN TIME: 43.8 SEC (ref 24.5–35.1)
PATIENT TEMP: 37 C
PCO2: 32.9 MMHG (ref 35–45)
PEEP/CPAP: 12 CMH2O
PFO2: 1.91 MMHG/%
PH BLOOD GAS: 7.41 (ref 7.35–7.45)
PHOSPHATE SERPL-MCNC: 6 MG/DL (ref 2.5–4.5)
PLATELET # BLD AUTO: 60 K/UL (ref 130–450)
PLATELET CONFIRMATION: NORMAL
PMV BLD AUTO: 11.3 FL (ref 7–12)
PO2: 95.7 MMHG (ref 75–100)
POTASSIUM SERPL-SCNC: 4.7 MMOL/L (ref 3.5–5)
POTASSIUM SERPL-SCNC: 5.1 MMOL/L (ref 3.5–5)
RBC # BLD AUTO: 2.77 M/UL (ref 3.8–5.8)
RBC # BLD: ABNORMAL 10*6/UL
RI(T): 2.21
RR MECHANICAL: 22 B/MIN
SODIUM SERPL-SCNC: 132 MMOL/L (ref 132–146)
SODIUM SERPL-SCNC: 134 MMOL/L (ref 132–146)
SOURCE, BLOOD GAS: ABNORMAL
THB: 8.7 G/DL (ref 11.5–16.5)
TIME ANALYZED: 432
TRANSFUSION STATUS: NORMAL
UNIT DIVISION: 0
UNIT ISSUE DATE/TIME: NORMAL
VT MECHANICAL: 450 ML
WBC OTHER # BLD: 8.2 K/UL (ref 4.5–11.5)

## 2023-10-11 PROCEDURE — 6360000002 HC RX W HCPCS

## 2023-10-11 PROCEDURE — 94003 VENT MGMT INPAT SUBQ DAY: CPT

## 2023-10-11 PROCEDURE — 71045 X-RAY EXAM CHEST 1 VIEW: CPT

## 2023-10-11 PROCEDURE — 80048 BASIC METABOLIC PNL TOTAL CA: CPT

## 2023-10-11 PROCEDURE — 2580000003 HC RX 258: Performed by: STUDENT IN AN ORGANIZED HEALTH CARE EDUCATION/TRAINING PROGRAM

## 2023-10-11 PROCEDURE — 94640 AIRWAY INHALATION TREATMENT: CPT

## 2023-10-11 PROCEDURE — 99291 CRITICAL CARE FIRST HOUR: CPT | Performed by: INTERNAL MEDICINE

## 2023-10-11 PROCEDURE — 83735 ASSAY OF MAGNESIUM: CPT

## 2023-10-11 PROCEDURE — 74176 CT ABD & PELVIS W/O CONTRAST: CPT

## 2023-10-11 PROCEDURE — 2500000003 HC RX 250 WO HCPCS: Performed by: STUDENT IN AN ORGANIZED HEALTH CARE EDUCATION/TRAINING PROGRAM

## 2023-10-11 PROCEDURE — 6360000002 HC RX W HCPCS: Performed by: INTERNAL MEDICINE

## 2023-10-11 PROCEDURE — C9113 INJ PANTOPRAZOLE SODIUM, VIA: HCPCS | Performed by: STUDENT IN AN ORGANIZED HEALTH CARE EDUCATION/TRAINING PROGRAM

## 2023-10-11 PROCEDURE — 74018 RADEX ABDOMEN 1 VIEW: CPT

## 2023-10-11 PROCEDURE — 71250 CT THORAX DX C-: CPT

## 2023-10-11 PROCEDURE — 76775 US EXAM ABDO BACK WALL LIM: CPT

## 2023-10-11 PROCEDURE — 85730 THROMBOPLASTIN TIME PARTIAL: CPT

## 2023-10-11 PROCEDURE — 85018 HEMOGLOBIN: CPT

## 2023-10-11 PROCEDURE — 6360000002 HC RX W HCPCS: Performed by: STUDENT IN AN ORGANIZED HEALTH CARE EDUCATION/TRAINING PROGRAM

## 2023-10-11 PROCEDURE — 84100 ASSAY OF PHOSPHORUS: CPT

## 2023-10-11 PROCEDURE — 82330 ASSAY OF CALCIUM: CPT

## 2023-10-11 PROCEDURE — 85014 HEMATOCRIT: CPT

## 2023-10-11 PROCEDURE — A4216 STERILE WATER/SALINE, 10 ML: HCPCS | Performed by: STUDENT IN AN ORGANIZED HEALTH CARE EDUCATION/TRAINING PROGRAM

## 2023-10-11 PROCEDURE — 2580000003 HC RX 258

## 2023-10-11 PROCEDURE — 99223 1ST HOSP IP/OBS HIGH 75: CPT | Performed by: INTERNAL MEDICINE

## 2023-10-11 PROCEDURE — 2500000003 HC RX 250 WO HCPCS: Performed by: INTERNAL MEDICINE

## 2023-10-11 PROCEDURE — 6370000000 HC RX 637 (ALT 250 FOR IP)

## 2023-10-11 PROCEDURE — 85025 COMPLETE CBC W/AUTO DIFF WBC: CPT

## 2023-10-11 PROCEDURE — 82962 GLUCOSE BLOOD TEST: CPT

## 2023-10-11 PROCEDURE — 6370000000 HC RX 637 (ALT 250 FOR IP): Performed by: STUDENT IN AN ORGANIZED HEALTH CARE EDUCATION/TRAINING PROGRAM

## 2023-10-11 PROCEDURE — APPSS60 APP SPLIT SHARED TIME 46-60 MINUTES: Performed by: PHYSICIAN ASSISTANT

## 2023-10-11 PROCEDURE — 82805 BLOOD GASES W/O2 SATURATION: CPT

## 2023-10-11 PROCEDURE — 2000000000 HC ICU R&B

## 2023-10-11 RX ORDER — HEPARIN SODIUM 10000 [USP'U]/100ML
5-30 INJECTION, SOLUTION INTRAVENOUS CONTINUOUS
Status: DISCONTINUED | OUTPATIENT
Start: 2023-10-11 | End: 2023-10-13 | Stop reason: HOSPADM

## 2023-10-11 RX ORDER — METOPROLOL TARTRATE 5 MG/5ML
5 INJECTION INTRAVENOUS ONCE
Status: COMPLETED | OUTPATIENT
Start: 2023-10-11 | End: 2023-10-11

## 2023-10-11 RX ADMIN — LEVETIRACETAM 500 MG: 100 INJECTION, SOLUTION INTRAVENOUS at 12:18

## 2023-10-11 RX ADMIN — BUDESONIDE 500 MCG: 0.5 INHALANT RESPIRATORY (INHALATION) at 10:11

## 2023-10-11 RX ADMIN — POLYVINYL ALCOHOL 1 DROP: 14 SOLUTION/ DROPS OPHTHALMIC at 15:34

## 2023-10-11 RX ADMIN — METOPROLOL TARTRATE 5 MG: 5 INJECTION INTRAVENOUS at 09:23

## 2023-10-11 RX ADMIN — INSULIN LISPRO 1 UNITS: 100 INJECTION, SOLUTION INTRAVENOUS; SUBCUTANEOUS at 04:36

## 2023-10-11 RX ADMIN — POLYVINYL ALCOHOL 1 DROP: 14 SOLUTION/ DROPS OPHTHALMIC at 21:21

## 2023-10-11 RX ADMIN — POLYVINYL ALCOHOL 1 DROP: 14 SOLUTION/ DROPS OPHTHALMIC at 18:17

## 2023-10-11 RX ADMIN — POLYVINYL ALCOHOL 1 DROP: 14 SOLUTION/ DROPS OPHTHALMIC at 04:37

## 2023-10-11 RX ADMIN — Medication 10 ML: at 20:37

## 2023-10-11 RX ADMIN — HEPARIN SODIUM 8 UNITS/KG/HR: 10000 INJECTION, SOLUTION INTRAVENOUS at 12:18

## 2023-10-11 RX ADMIN — Medication 10 ML: at 08:31

## 2023-10-11 RX ADMIN — POLYVINYL ALCOHOL 1 DROP: 14 SOLUTION/ DROPS OPHTHALMIC at 01:59

## 2023-10-11 RX ADMIN — Medication 125 MCG/HR: at 15:35

## 2023-10-11 RX ADMIN — PIPERACILLIN AND TAZOBACTAM 4500 MG: 4; .5 INJECTION, POWDER, LYOPHILIZED, FOR SOLUTION INTRAVENOUS at 14:17

## 2023-10-11 RX ADMIN — INSULIN LISPRO 1 UNITS: 100 INJECTION, SOLUTION INTRAVENOUS; SUBCUTANEOUS at 16:26

## 2023-10-11 RX ADMIN — POLYVINYL ALCOHOL 1 DROP: 14 SOLUTION/ DROPS OPHTHALMIC at 14:14

## 2023-10-11 RX ADMIN — POLYVINYL ALCOHOL 1 DROP: 14 SOLUTION/ DROPS OPHTHALMIC at 08:31

## 2023-10-11 RX ADMIN — POLYVINYL ALCOHOL 1 DROP: 14 SOLUTION/ DROPS OPHTHALMIC at 06:14

## 2023-10-11 RX ADMIN — CHLORHEXIDINE GLUCONATE 15 ML: 1.2 RINSE ORAL at 20:37

## 2023-10-11 RX ADMIN — Medication 175 MCG/HR: at 07:28

## 2023-10-11 RX ADMIN — METOPROLOL TARTRATE 12.5 MG: 25 TABLET, FILM COATED ORAL at 14:14

## 2023-10-11 RX ADMIN — Medication 9 MG/HR: at 06:14

## 2023-10-11 RX ADMIN — DEXAMETHASONE SODIUM PHOSPHATE 20 MG: 4 INJECTION, SOLUTION INTRAMUSCULAR; INTRAVENOUS at 10:39

## 2023-10-11 RX ADMIN — ARFORMOTEROL TARTRATE 15 MCG: 15 SOLUTION RESPIRATORY (INHALATION) at 21:23

## 2023-10-11 RX ADMIN — BUDESONIDE 500 MCG: 0.5 INHALANT RESPIRATORY (INHALATION) at 21:23

## 2023-10-11 RX ADMIN — INSULIN LISPRO 2 UNITS: 100 INJECTION, SOLUTION INTRAVENOUS; SUBCUTANEOUS at 20:36

## 2023-10-11 RX ADMIN — PANTOPRAZOLE SODIUM 40 MG: 40 INJECTION, POWDER, FOR SOLUTION INTRAVENOUS at 08:30

## 2023-10-11 RX ADMIN — AMIODARONE HYDROCHLORIDE 400 MG: 200 TABLET ORAL at 20:36

## 2023-10-11 RX ADMIN — AMIODARONE HYDROCHLORIDE 400 MG: 200 TABLET ORAL at 08:29

## 2023-10-11 RX ADMIN — PIPERACILLIN AND TAZOBACTAM 4500 MG: 4; .5 INJECTION, POWDER, LYOPHILIZED, FOR SOLUTION INTRAVENOUS at 06:37

## 2023-10-11 RX ADMIN — ARFORMOTEROL TARTRATE 15 MCG: 15 SOLUTION RESPIRATORY (INHALATION) at 10:11

## 2023-10-11 RX ADMIN — PIPERACILLIN AND TAZOBACTAM 4500 MG: 4; .5 INJECTION, POWDER, LYOPHILIZED, FOR SOLUTION INTRAVENOUS at 21:19

## 2023-10-11 RX ADMIN — INSULIN LISPRO 2 UNITS: 100 INJECTION, SOLUTION INTRAVENOUS; SUBCUTANEOUS at 12:21

## 2023-10-11 RX ADMIN — POLYVINYL ALCOHOL 1 DROP: 14 SOLUTION/ DROPS OPHTHALMIC at 20:37

## 2023-10-11 RX ADMIN — POLYVINYL ALCOHOL 1 DROP: 14 SOLUTION/ DROPS OPHTHALMIC at 12:19

## 2023-10-11 RX ADMIN — METOPROLOL TARTRATE 12.5 MG: 25 TABLET, FILM COATED ORAL at 20:36

## 2023-10-11 RX ADMIN — CHLORHEXIDINE GLUCONATE 15 ML: 1.2 RINSE ORAL at 08:31

## 2023-10-11 RX ADMIN — INSULIN LISPRO 1 UNITS: 100 INJECTION, SOLUTION INTRAVENOUS; SUBCUTANEOUS at 08:30

## 2023-10-11 RX ADMIN — POLYVINYL ALCOHOL 1 DROP: 14 SOLUTION/ DROPS OPHTHALMIC at 10:41

## 2023-10-11 RX ADMIN — Medication 175 MCG/HR: at 01:05

## 2023-10-11 ASSESSMENT — PULMONARY FUNCTION TESTS
PIF_VALUE: 30
PIF_VALUE: 32
PIF_VALUE: 30
PIF_VALUE: 30
PIF_VALUE: 29
PIF_VALUE: 30
PIF_VALUE: 37
PIF_VALUE: 31
PIF_VALUE: 40
PIF_VALUE: 33
PIF_VALUE: 33
PIF_VALUE: 30
PIF_VALUE: 34
PIF_VALUE: 33
PIF_VALUE: 30
PIF_VALUE: 29
PIF_VALUE: 37
PIF_VALUE: 41
PIF_VALUE: 31
PIF_VALUE: 35
PIF_VALUE: 29
PIF_VALUE: 34
PIF_VALUE: 32
PIF_VALUE: 36
PIF_VALUE: 30
PIF_VALUE: 32
PIF_VALUE: 0
PIF_VALUE: 36
PIF_VALUE: 29
PIF_VALUE: 31

## 2023-10-11 ASSESSMENT — PAIN SCALES - GENERAL
PAINLEVEL_OUTOF10: 0

## 2023-10-11 NOTE — PLAN OF CARE
Problem: Discharge Planning  Goal: Discharge to home or other facility with appropriate resources  10/11/2023 1023 by Raven Chowdhury RN  Outcome: Progressing  Flowsheets (Taken 10/11/2023 0800)  Discharge to home or other facility with appropriate resources: Identify barriers to discharge with patient and caregiver     Problem: Pain  Goal: Verbalizes/displays adequate comfort level or baseline comfort level  10/11/2023 1023 by Raven Chowdhury RN  Outcome: Progressing  Flowsheets (Taken 10/11/2023 0800)  Verbalizes/displays adequate comfort level or baseline comfort level: Encourage patient to monitor pain and request assistance     Problem: ABCDS Injury Assessment  Goal: Absence of physical injury  10/11/2023 1023 by Rvaen Chowdhury RN  Outcome: Progressing  Flowsheets (Taken 10/11/2023 0800)  Absence of Physical Injury: Implement safety measures based on patient assessment     Problem: Safety - Adult  Goal: Free from fall injury  10/11/2023 1023 by Raven Chowdhury RN  Outcome: Progressing  Flowsheets (Taken 10/11/2023 0800)  Free From Fall Injury: Instruct family/caregiver on patient safety     Problem: Safety - Medical Restraint  Goal: Remains free of injury from restraints (Restraint for Interference with Medical Device)  Description: INTERVENTIONS:  1. Determine that other, less restrictive measures have been tried or would not be effective before applying the restraint  2. Evaluate the patient's condition at the time of restraint application  3. Inform patient/family regarding the reason for restraint  4.  Q2H: Monitor safety, psychosocial status, comfort, nutrition and hydration  10/11/2023 1023 by Raven Chowdhury RN  Outcome: Progressing  Flowsheets (Taken 10/11/2023 0730)  Remains free of injury from restraints (restraint for interference with medical device): Every 2 hours: Monitor safety, psychosocial status, comfort, nutrition and hydration     Problem: Skin/Tissue

## 2023-10-11 NOTE — CARE COORDINATION
Care Coordination:  LOS 3 day, remains intubated, Fentanyl, Versed, Zosyn. Nephrology following for R perinephric hemorrhage and hematoma s/p Right IR kidney bx 10/5/23 s/p IR embolization 10/9/23. Plan per wife remains home at discharge. Would benefit from therapy when medically appropriate.     Electronically signed by Yadira Velazquez RN on 10/11/2023 at 1:46 PM

## 2023-10-11 NOTE — PROGRESS NOTES
Physician Progress Note      Cortez Ortiz  CSN #:                  [id number]  :                       1970  ADMIT DATE:       10/8/2023 6:00 AM  DISCH DATE:  RESPONDING  PROVIDER #:        Samson Smith          QUERY TEXT:    Pt admitted with ABLA, pneumonia, ARDS and has Diastolic CHF documented   without acuity. If possible, please document in progress notes and discharge   summary further specificity regarding the acuity of CHF:    The medical record reflects the following:  Risk Factors: Known prior history of Diastolic heart failure  Clinical Indicators: Pt noted to have Acute hypoxic res failure on arrival in   IM notes reference to known HFpEF, BNP 37213, CT chest: \". .. Slight increase   seen in the bilateral pleural effusions as well as the airspace disease seen   within the central aspect of the lung fields bilaterally. Heide Muff Heide Muff \"  Treatment: IV Lasix    Thank you,  Shelia VERA, RN, CRCR  Clinical Documentation Improvement  [email or web address]  Options provided:  -- Acute on Chronic Diastolic CHF/HFpEF  -- Chronic Diastolic CHF/HFpEF  -- Other - I will add my own diagnosis  -- Disagree - Not applicable / Not valid  -- Disagree - Clinically unable to determine / Unknown  -- Refer to Clinical Documentation Reviewer    PROVIDER RESPONSE TEXT:    This patient is in acute on chronic diastolic CHF/HFpEF.     Query created by: More Smith on 10/10/2023 1:52 PM      Electronically signed by:  Samson Smith 10/10/2023 10:06 PM

## 2023-10-11 NOTE — PLAN OF CARE
Transfer to [facility] initiated via transfer center at family request. Awaiting callback.    Prashanth Shook DO

## 2023-10-11 NOTE — PROGRESS NOTES
Comprehensive Nutrition Assessment    Type and Reason for Visit:  Initial, Consult (+new TF)    Nutrition Recommendations/Plan:     Continue NPO. TF on hold d/t high GRV of 650cc yesterday. If resumed trial Peptide Based formula for optimal GI tolerance starting @ 10-20 ml/hr trickle rate       Malnutrition Assessment:  Malnutrition Status: At risk for malnutrition  (10/11/23 1454)    Context:  Acute Illness     Findings of the 6 clinical characteristics of malnutrition:  Energy Intake:  Mild decrease in energy intake (UTO PTA intakes)  Weight Loss:  No significant weight loss (per EMR review)     Body Fat Loss:  No significant body fat loss     Muscle Mass Loss:  No significant muscle mass loss    Fluid Accumulation:  No significant fluid accumulation     Strength:  Not Performed    Nutrition Assessment:    Pt admit w/ SOB after recent R kidney bx 10/5 for evaluation of possible lupus nephritis now found to have hemorrhage/perirenal hematoma s/p IR embo 10/8. Pt intubated +PNA +ARDS s/p bronch. Noted DEVAN. PMHx DVT, Seizure Disorder, CKD, & Clotting disorder. TF held d/t GRV 650cc yesterday. Will provide updated recs if/when needed & monitor.     Nutrition Related Findings:    Pt intubated/ sedated, MAP WNL, +I/O's 2L, +1 pitting edema, active BS, abd distention, OGT (TF held), hyperglycemia, no hx DM     Wound Type: None       Current Nutrition Intake & Therapies:    Average Meal Intake: NPO     Current Tube Feeding (TF) Orders:  Feeding Route: Orogastric  Formula: Standard with Fiber  Schedule: Continuous  Feeding Regimen: 60 ml/hr, not running  Water Flushes: 30 ml q 4 hr = 180 ml water  Goal TF & Flush Orders Provides: 1440 ml tv, 2160 kcals, 92 gm pro, 1094 ml free water, 1274 ml total water w/ flushes    Anthropometric Measures:  Height: 6' 3\" (190.5 cm)  Ideal Body Weight (IBW): 196 lbs (89 kg)    Admission Body Weight: 238 lb 3.2 oz (108 kg) (10/10 first measured)  Current Body Weight: 238 lb 3.2 oz

## 2023-10-11 NOTE — PROGRESS NOTES
Spoke with Dr. Miriam Calderon. F unable to conference about this patient until tomorrow. Wife informed.

## 2023-10-11 NOTE — PROGRESS NOTES
Patient continues to reach for lines and tubes when unrestrained despite verbal attempts to deter. Restraints continued for safety purposes.

## 2023-10-11 NOTE — PLAN OF CARE
Problem: Discharge Planning  Goal: Discharge to home or other facility with appropriate resources  Outcome: Progressing     Problem: Pain  Goal: Verbalizes/displays adequate comfort level or baseline comfort level  Outcome: Progressing     Problem: ABCDS Injury Assessment  Goal: Absence of physical injury  10/10/2023 2122 by Florencio Newton RN  Outcome: Progressing     Problem: Safety - Adult  Goal: Free from fall injury  10/10/2023 2122 by Florencio Newton RN  Outcome: Progressing     Problem: Safety - Medical Restraint  Goal: Remains free of injury from restraints (Restraint for Interference with Medical Device)  Description: INTERVENTIONS:  1. Determine that other, less restrictive measures have been tried or would not be effective before applying the restraint  2. Evaluate the patient's condition at the time of restraint application  3. Inform patient/family regarding the reason for restraint  4. Q2H: Monitor safety, psychosocial status, comfort, nutrition and hydration  10/10/2023 2122 by Florencio Newton RN  Outcome: Progressing  Flowsheets (Taken 10/10/2023 2000)  Remains free of injury from restraints (restraint for interference with medical device): Every 2 hours: Monitor safety, psychosocial status, comfort, nutrition and hydration     Problem: Skin/Tissue Integrity  Goal: Absence of new skin breakdown  Description: 1. Monitor for areas of redness and/or skin breakdown  2. Assess vascular access sites hourly  3. Every 4-6 hours minimum:  Change oxygen saturation probe site  4. Every 4-6 hours:  If on nasal continuous positive airway pressure, respiratory therapy assess nares and determine need for appliance change or resting period.   10/10/2023 2122 by Florencio Newton RN  Outcome: Progressing     Problem: Respiratory - Adult  Goal: Achieves optimal ventilation and oxygenation  Outcome: Progressing

## 2023-10-11 NOTE — PLAN OF CARE
Problem: Discharge Planning  Goal: Discharge to home or other facility with appropriate resources  10/11/2023 1618 by Anabel Webber RN  Outcome: Progressing  Flowsheets (Taken 10/11/2023 1600)  Discharge to home or other facility with appropriate resources: Identify barriers to discharge with patient and caregiver     Problem: Pain  Goal: Verbalizes/displays adequate comfort level or baseline comfort level  10/11/2023 1618 by Anabel Webber RN  Outcome: Progressing  Flowsheets (Taken 10/11/2023 1600)  Verbalizes/displays adequate comfort level or baseline comfort level: Encourage patient to monitor pain and request assistance     Problem: ABCDS Injury Assessment  Goal: Absence of physical injury  10/11/2023 1618 by Anabel Webber RN  Outcome: Progressing  Flowsheets (Taken 10/11/2023 1600)  Absence of Physical Injury: Implement safety measures based on patient assessment     Problem: Safety - Adult  Goal: Free from fall injury  10/11/2023 1618 by Anabel Webber RN  Outcome: Progressing  Flowsheets (Taken 10/11/2023 1600)  Free From Fall Injury: Instruct family/caregiver on patient safety     Problem: Safety - Medical Restraint  Goal: Remains free of injury from restraints (Restraint for Interference with Medical Device)  Description: INTERVENTIONS:  1. Determine that other, less restrictive measures have been tried or would not be effective before applying the restraint  2. Evaluate the patient's condition at the time of restraint application  3. Inform patient/family regarding the reason for restraint  4.  Q2H: Monitor safety, psychosocial status, comfort, nutrition and hydration  10/11/2023 1618 by Anabel Webber RN  Outcome: Progressing  Flowsheets (Taken 10/11/2023 1600)  Remains free of injury from restraints (restraint for interference with medical device): Every 2 hours: Monitor safety, psychosocial status, comfort, nutrition and hydration     Problem: Skin/Tissue Integrity  Goal: Absence of new skin breakdown  Description: 1. Monitor for areas of redness and/or skin breakdown  2. Assess vascular access sites hourly  3. Every 4-6 hours minimum:  Change oxygen saturation probe site  4. Every 4-6 hours:  If on nasal continuous positive airway pressure, respiratory therapy assess nares and determine need for appliance change or resting period.   10/11/2023 1618 by Alton Magdaleno RN  Outcome: Progressing     Problem: Respiratory - Adult  Goal: Achieves optimal ventilation and oxygenation  10/11/2023 1618 by Alton Magdaleno RN  Outcome: Progressing     Problem: Nutrition Deficit:  Goal: Optimize nutritional status  Outcome: Progressing

## 2023-10-12 ENCOUNTER — APPOINTMENT (OUTPATIENT)
Dept: GENERAL RADIOLOGY | Age: 53
DRG: 811 | End: 2023-10-12
Payer: COMMERCIAL

## 2023-10-12 LAB
AADO2: 201 MMHG
ABO/RH: NORMAL
ANION GAP SERPL CALCULATED.3IONS-SCNC: 15 MMOL/L (ref 7–16)
ANION GAP SERPL CALCULATED.3IONS-SCNC: 17 MMOL/L (ref 7–16)
ANION GAP SERPL CALCULATED.3IONS-SCNC: 19 MMOL/L (ref 7–16)
ANTIBODY SCREEN: NEGATIVE
ARM BAND NUMBER: NORMAL
B.E.: -3 MMOL/L (ref -3–3)
BASOPHILS # BLD: 0 K/UL (ref 0–0.2)
BASOPHILS NFR BLD: 0 % (ref 0–2)
BLOOD BANK BLOOD PRODUCT EXPIRATION DATE: NORMAL
BLOOD BANK DISPENSE STATUS: NORMAL
BLOOD BANK ISBT PRODUCT BLOOD TYPE: 1700
BLOOD BANK ISBT PRODUCT BLOOD TYPE: 9500
BLOOD BANK PRODUCT CODE: NORMAL
BLOOD BANK SAMPLE EXPIRATION: NORMAL
BLOOD BANK UNIT TYPE AND RH: NORMAL
BPU ID: NORMAL
BUN SERPL-MCNC: 100 MG/DL (ref 6–20)
BUN SERPL-MCNC: 91 MG/DL (ref 6–20)
BUN SERPL-MCNC: 93 MG/DL (ref 6–20)
CALCIUM SERPL-MCNC: 8.2 MG/DL (ref 8.6–10.2)
CALCIUM SERPL-MCNC: 8.3 MG/DL (ref 8.6–10.2)
CALCIUM SERPL-MCNC: 8.3 MG/DL (ref 8.6–10.2)
CHLORIDE SERPL-SCNC: 100 MMOL/L (ref 98–107)
CHLORIDE SERPL-SCNC: 107 MMOL/L (ref 98–107)
CHLORIDE SERPL-SCNC: 99 MMOL/L (ref 98–107)
CO2 SERPL-SCNC: 16 MMOL/L (ref 22–29)
CO2 SERPL-SCNC: 18 MMOL/L (ref 22–29)
CO2 SERPL-SCNC: 19 MMOL/L (ref 22–29)
COHB: 0.3 % (ref 0–1.5)
COMPONENT: NORMAL
CREAT SERPL-MCNC: 4.6 MG/DL (ref 0.7–1.2)
CREAT SERPL-MCNC: 4.7 MG/DL (ref 0.7–1.2)
CREAT SERPL-MCNC: 4.9 MG/DL (ref 0.7–1.2)
CRITICAL: ABNORMAL
CROSSMATCH RESULT: NORMAL
DATE ANALYZED: ABNORMAL
DATE OF COLLECTION: ABNORMAL
EOSINOPHIL # BLD: 0 K/UL (ref 0.05–0.5)
EOSINOPHILS RELATIVE PERCENT: 0 % (ref 0–6)
ERYTHROCYTE [DISTWIDTH] IN BLOOD BY AUTOMATED COUNT: 18.8 % (ref 11.5–15)
FIO2: 45 %
GFR SERPL CREATININE-BSD FRML MDRD: 13 ML/MIN/1.73M2
GFR SERPL CREATININE-BSD FRML MDRD: 14 ML/MIN/1.73M2
GFR SERPL CREATININE-BSD FRML MDRD: 14 ML/MIN/1.73M2
GLUCOSE BLD-MCNC: 182 MG/DL (ref 74–99)
GLUCOSE BLD-MCNC: 194 MG/DL (ref 74–99)
GLUCOSE BLD-MCNC: 196 MG/DL (ref 74–99)
GLUCOSE BLD-MCNC: 204 MG/DL (ref 74–99)
GLUCOSE BLD-MCNC: 205 MG/DL (ref 74–99)
GLUCOSE BLD-MCNC: 206 MG/DL (ref 74–99)
GLUCOSE BLD-MCNC: 225 MG/DL (ref 74–99)
GLUCOSE BLD-MCNC: 234 MG/DL (ref 74–99)
GLUCOSE SERPL-MCNC: 183 MG/DL (ref 74–99)
GLUCOSE SERPL-MCNC: 186 MG/DL (ref 74–99)
GLUCOSE SERPL-MCNC: 188 MG/DL (ref 74–99)
HCO3: 20.8 MMOL/L (ref 22–26)
HCT VFR BLD AUTO: 23.4 % (ref 37–54)
HGB BLD-MCNC: 7.8 G/DL (ref 12.5–16.5)
HHB: 7.4 % (ref 0–5)
LAB: ABNORMAL
LYMPHOCYTES NFR BLD: 0.46 K/UL (ref 1.5–4)
LYMPHOCYTES RELATIVE PERCENT: 4 % (ref 20–42)
Lab: 435
MAGNESIUM SERPL-MCNC: 2.9 MG/DL (ref 1.6–2.6)
MCH RBC QN AUTO: 26.7 PG (ref 26–35)
MCHC RBC AUTO-ENTMCNC: 33.3 G/DL (ref 32–34.5)
MCV RBC AUTO: 80.1 FL (ref 80–99.9)
METHB: 0.9 % (ref 0–1.5)
MICROORGANISM SPEC CULT: ABNORMAL
MICROORGANISM SPEC CULT: NO GROWTH
MICROORGANISM/AGENT SPEC: ABNORMAL
MODE: AC
MONOCYTES NFR BLD: 0.55 K/UL (ref 0.1–0.95)
MONOCYTES NFR BLD: 5 % (ref 2–12)
NEUTROPHILS NFR BLD: 90 % (ref 43–80)
NEUTS SEG NFR BLD: 9.5 K/UL (ref 1.8–7.3)
NUCLEATED RED BLOOD CELLS: 5 PER 100 WBC
O2 CONTENT: 11.5 ML/DL
O2 SATURATION: 92.5 % (ref 92–98.5)
O2HB: 91.4 % (ref 94–97)
OPERATOR ID: 2593
PARTIAL THROMBOPLASTIN TIME: 54.9 SEC (ref 24.5–35.1)
PARTIAL THROMBOPLASTIN TIME: 55.8 SEC (ref 24.5–35.1)
PATIENT TEMP: 37 C
PCO2: 32.5 MMHG (ref 35–45)
PEEP/CPAP: 12 CMH2O
PFO2: 1.59 MMHG/%
PH BLOOD GAS: 7.42 (ref 7.35–7.45)
PHOSPHATE SERPL-MCNC: 6.6 MG/DL (ref 2.5–4.5)
PLATELET # BLD AUTO: 71 K/UL (ref 130–450)
PLATELET CONFIRMATION: NORMAL
PMV BLD AUTO: 11.4 FL (ref 7–12)
PO2: 71.6 MMHG (ref 75–100)
POTASSIUM SERPL-SCNC: 4.8 MMOL/L (ref 3.5–5)
POTASSIUM SERPL-SCNC: 5 MMOL/L (ref 3.5–5)
POTASSIUM SERPL-SCNC: 5.4 MMOL/L (ref 3.5–5)
RBC # BLD AUTO: 2.92 M/UL (ref 3.8–5.8)
RBC # BLD: ABNORMAL 10*6/UL
RI(T): 2.81
RR MECHANICAL: 22 B/MIN
SODIUM SERPL-SCNC: 133 MMOL/L (ref 132–146)
SODIUM SERPL-SCNC: 135 MMOL/L (ref 132–146)
SODIUM SERPL-SCNC: 142 MMOL/L (ref 132–146)
SOURCE, BLOOD GAS: ABNORMAL
SPECIMEN DESCRIPTION: ABNORMAL
THB: 8.9 G/DL (ref 11.5–16.5)
TIME ANALYZED: 445
TRANSFUSION STATUS: NORMAL
UNIT DIVISION: 0
UNIT ISSUE DATE/TIME: NORMAL
VT MECHANICAL: 450 ML
WBC OTHER # BLD: 10.5 K/UL (ref 4.5–11.5)

## 2023-10-12 PROCEDURE — 2000000000 HC ICU R&B

## 2023-10-12 PROCEDURE — 2580000003 HC RX 258

## 2023-10-12 PROCEDURE — 6360000002 HC RX W HCPCS

## 2023-10-12 PROCEDURE — C9113 INJ PANTOPRAZOLE SODIUM, VIA: HCPCS | Performed by: STUDENT IN AN ORGANIZED HEALTH CARE EDUCATION/TRAINING PROGRAM

## 2023-10-12 PROCEDURE — 6360000002 HC RX W HCPCS: Performed by: STUDENT IN AN ORGANIZED HEALTH CARE EDUCATION/TRAINING PROGRAM

## 2023-10-12 PROCEDURE — 99291 CRITICAL CARE FIRST HOUR: CPT | Performed by: INTERNAL MEDICINE

## 2023-10-12 PROCEDURE — 2580000003 HC RX 258: Performed by: STUDENT IN AN ORGANIZED HEALTH CARE EDUCATION/TRAINING PROGRAM

## 2023-10-12 PROCEDURE — 6360000002 HC RX W HCPCS: Performed by: INTERNAL MEDICINE

## 2023-10-12 PROCEDURE — 84100 ASSAY OF PHOSPHORUS: CPT

## 2023-10-12 PROCEDURE — 2500000003 HC RX 250 WO HCPCS: Performed by: INTERNAL MEDICINE

## 2023-10-12 PROCEDURE — 94003 VENT MGMT INPAT SUBQ DAY: CPT

## 2023-10-12 PROCEDURE — A4216 STERILE WATER/SALINE, 10 ML: HCPCS | Performed by: STUDENT IN AN ORGANIZED HEALTH CARE EDUCATION/TRAINING PROGRAM

## 2023-10-12 PROCEDURE — 85730 THROMBOPLASTIN TIME PARTIAL: CPT

## 2023-10-12 PROCEDURE — 71045 X-RAY EXAM CHEST 1 VIEW: CPT

## 2023-10-12 PROCEDURE — 80048 BASIC METABOLIC PNL TOTAL CA: CPT

## 2023-10-12 PROCEDURE — 82805 BLOOD GASES W/O2 SATURATION: CPT

## 2023-10-12 PROCEDURE — 6370000000 HC RX 637 (ALT 250 FOR IP)

## 2023-10-12 PROCEDURE — 94640 AIRWAY INHALATION TREATMENT: CPT

## 2023-10-12 PROCEDURE — 82962 GLUCOSE BLOOD TEST: CPT

## 2023-10-12 PROCEDURE — 6370000000 HC RX 637 (ALT 250 FOR IP): Performed by: STUDENT IN AN ORGANIZED HEALTH CARE EDUCATION/TRAINING PROGRAM

## 2023-10-12 PROCEDURE — 99232 SBSQ HOSP IP/OBS MODERATE 35: CPT | Performed by: INTERNAL MEDICINE

## 2023-10-12 PROCEDURE — 85025 COMPLETE CBC W/AUTO DIFF WBC: CPT

## 2023-10-12 PROCEDURE — 83735 ASSAY OF MAGNESIUM: CPT

## 2023-10-12 RX ORDER — FUROSEMIDE 10 MG/ML
40 INJECTION INTRAMUSCULAR; INTRAVENOUS ONCE
Status: COMPLETED | OUTPATIENT
Start: 2023-10-12 | End: 2023-10-12

## 2023-10-12 RX ORDER — POLYETHYLENE GLYCOL 3350 17 G/17G
17 POWDER, FOR SOLUTION ORAL 2 TIMES DAILY
Status: DISCONTINUED | OUTPATIENT
Start: 2023-10-12 | End: 2023-10-13 | Stop reason: HOSPADM

## 2023-10-12 RX ORDER — DEXAMETHASONE SODIUM PHOSPHATE 10 MG/ML
10 INJECTION, SOLUTION INTRAMUSCULAR; INTRAVENOUS EVERY 24 HOURS
Status: DISCONTINUED | OUTPATIENT
Start: 2023-10-15 | End: 2023-10-13 | Stop reason: HOSPADM

## 2023-10-12 RX ORDER — CALCIUM GLUCONATE 10 MG/ML
1000 INJECTION, SOLUTION INTRAVENOUS ONCE
Status: COMPLETED | OUTPATIENT
Start: 2023-10-12 | End: 2023-10-12

## 2023-10-12 RX ORDER — SENNA AND DOCUSATE SODIUM 50; 8.6 MG/1; MG/1
2 TABLET, FILM COATED ORAL 2 TIMES DAILY
Status: DISCONTINUED | OUTPATIENT
Start: 2023-10-12 | End: 2023-10-13 | Stop reason: HOSPADM

## 2023-10-12 RX ORDER — DEXTROSE MONOHYDRATE 100 MG/ML
INJECTION, SOLUTION INTRAVENOUS CONTINUOUS PRN
Status: DISCONTINUED | OUTPATIENT
Start: 2023-10-12 | End: 2023-10-12

## 2023-10-12 RX ADMIN — Medication 5000 UNITS: at 09:49

## 2023-10-12 RX ADMIN — POLYVINYL ALCOHOL 1 DROP: 14 SOLUTION/ DROPS OPHTHALMIC at 17:01

## 2023-10-12 RX ADMIN — CHLORHEXIDINE GLUCONATE 15 ML: 1.2 RINSE ORAL at 20:10

## 2023-10-12 RX ADMIN — METOPROLOL TARTRATE 25 MG: 25 TABLET, FILM COATED ORAL at 20:11

## 2023-10-12 RX ADMIN — INSULIN LISPRO 2 UNITS: 100 INJECTION, SOLUTION INTRAVENOUS; SUBCUTANEOUS at 17:00

## 2023-10-12 RX ADMIN — Medication 175 MCG/HR: at 13:22

## 2023-10-12 RX ADMIN — INSULIN LISPRO 1 UNITS: 100 INJECTION, SOLUTION INTRAVENOUS; SUBCUTANEOUS at 20:08

## 2023-10-12 RX ADMIN — POLYETHYLENE GLYCOL 3350 17 G: 17 POWDER, FOR SOLUTION ORAL at 20:11

## 2023-10-12 RX ADMIN — POLYETHYLENE GLYCOL 3350 17 G: 17 POWDER, FOR SOLUTION ORAL at 13:47

## 2023-10-12 RX ADMIN — DEXAMETHASONE SODIUM PHOSPHATE 20 MG: 4 INJECTION, SOLUTION INTRAMUSCULAR; INTRAVENOUS at 09:58

## 2023-10-12 RX ADMIN — POLYVINYL ALCOHOL 1 DROP: 14 SOLUTION/ DROPS OPHTHALMIC at 01:49

## 2023-10-12 RX ADMIN — POLYVINYL ALCOHOL 1 DROP: 14 SOLUTION/ DROPS OPHTHALMIC at 09:53

## 2023-10-12 RX ADMIN — CHLORHEXIDINE GLUCONATE 15 ML: 1.2 RINSE ORAL at 09:50

## 2023-10-12 RX ADMIN — PANTOPRAZOLE SODIUM 40 MG: 40 INJECTION, POWDER, FOR SOLUTION INTRAVENOUS at 09:49

## 2023-10-12 RX ADMIN — HEPARIN SODIUM 10 UNITS/KG/HR: 10000 INJECTION, SOLUTION INTRAVENOUS at 11:37

## 2023-10-12 RX ADMIN — FUROSEMIDE 40 MG: 10 INJECTION, SOLUTION INTRAMUSCULAR; INTRAVENOUS at 17:01

## 2023-10-12 RX ADMIN — POLYVINYL ALCOHOL 1 DROP: 14 SOLUTION/ DROPS OPHTHALMIC at 12:00

## 2023-10-12 RX ADMIN — Medication 175 MCG/HR: at 19:11

## 2023-10-12 RX ADMIN — SENNOSIDES AND DOCUSATE SODIUM 2 TABLET: 50; 8.6 TABLET ORAL at 13:47

## 2023-10-12 RX ADMIN — POLYVINYL ALCOHOL 1 DROP: 14 SOLUTION/ DROPS OPHTHALMIC at 18:05

## 2023-10-12 RX ADMIN — Medication 8 MG/HR: at 01:42

## 2023-10-12 RX ADMIN — LEVETIRACETAM 500 MG: 100 INJECTION, SOLUTION INTRAVENOUS at 00:18

## 2023-10-12 RX ADMIN — INSULIN HUMAN 10 UNITS: 100 INJECTION, SOLUTION PARENTERAL at 06:31

## 2023-10-12 RX ADMIN — INSULIN LISPRO 1 UNITS: 100 INJECTION, SOLUTION INTRAVENOUS; SUBCUTANEOUS at 04:13

## 2023-10-12 RX ADMIN — POLYVINYL ALCOHOL 1 DROP: 14 SOLUTION/ DROPS OPHTHALMIC at 05:31

## 2023-10-12 RX ADMIN — POLYVINYL ALCOHOL 1 DROP: 14 SOLUTION/ DROPS OPHTHALMIC at 03:44

## 2023-10-12 RX ADMIN — ARFORMOTEROL TARTRATE 15 MCG: 15 SOLUTION RESPIRATORY (INHALATION) at 19:47

## 2023-10-12 RX ADMIN — Medication 150 MCG/HR: at 00:43

## 2023-10-12 RX ADMIN — POLYVINYL ALCOHOL 1 DROP: 14 SOLUTION/ DROPS OPHTHALMIC at 00:19

## 2023-10-12 RX ADMIN — Medication 150 MCG/HR: at 06:27

## 2023-10-12 RX ADMIN — Medication 9 MG/HR: at 11:35

## 2023-10-12 RX ADMIN — POLYVINYL ALCOHOL 1 DROP: 14 SOLUTION/ DROPS OPHTHALMIC at 23:08

## 2023-10-12 RX ADMIN — POLYVINYL ALCOHOL 1 DROP: 14 SOLUTION/ DROPS OPHTHALMIC at 22:01

## 2023-10-12 RX ADMIN — LEVETIRACETAM 500 MG: 100 INJECTION, SOLUTION INTRAVENOUS at 23:08

## 2023-10-12 RX ADMIN — SENNOSIDES AND DOCUSATE SODIUM 2 TABLET: 50; 8.6 TABLET ORAL at 20:11

## 2023-10-12 RX ADMIN — CALCIUM GLUCONATE 1000 MG: 10 INJECTION, SOLUTION INTRAVENOUS at 11:47

## 2023-10-12 RX ADMIN — AMIODARONE HYDROCHLORIDE 400 MG: 200 TABLET ORAL at 20:11

## 2023-10-12 RX ADMIN — Medication 10 ML: at 09:49

## 2023-10-12 RX ADMIN — BUDESONIDE 500 MCG: 0.5 INHALANT RESPIRATORY (INHALATION) at 19:47

## 2023-10-12 RX ADMIN — INSULIN LISPRO 2 UNITS: 100 INJECTION, SOLUTION INTRAVENOUS; SUBCUTANEOUS at 00:18

## 2023-10-12 RX ADMIN — INSULIN LISPRO 2 UNITS: 100 INJECTION, SOLUTION INTRAVENOUS; SUBCUTANEOUS at 09:49

## 2023-10-12 RX ADMIN — DEXTROSE MONOHYDRATE 250 ML: 100 INJECTION, SOLUTION INTRAVENOUS at 06:31

## 2023-10-12 RX ADMIN — INSULIN LISPRO 2 UNITS: 100 INJECTION, SOLUTION INTRAVENOUS; SUBCUTANEOUS at 23:41

## 2023-10-12 RX ADMIN — LEVETIRACETAM 500 MG: 100 INJECTION, SOLUTION INTRAVENOUS at 11:49

## 2023-10-12 RX ADMIN — METOPROLOL TARTRATE 12.5 MG: 25 TABLET, FILM COATED ORAL at 09:51

## 2023-10-12 RX ADMIN — PIPERACILLIN AND TAZOBACTAM 4500 MG: 4; .5 INJECTION, POWDER, LYOPHILIZED, FOR SOLUTION INTRAVENOUS at 05:28

## 2023-10-12 RX ADMIN — Medication 10 ML: at 20:11

## 2023-10-12 RX ADMIN — PIPERACILLIN AND TAZOBACTAM 4500 MG: 4; .5 INJECTION, POWDER, LYOPHILIZED, FOR SOLUTION INTRAVENOUS at 22:03

## 2023-10-12 RX ADMIN — POLYVINYL ALCOHOL 1 DROP: 14 SOLUTION/ DROPS OPHTHALMIC at 20:11

## 2023-10-12 RX ADMIN — PIPERACILLIN AND TAZOBACTAM 4500 MG: 4; .5 INJECTION, POWDER, LYOPHILIZED, FOR SOLUTION INTRAVENOUS at 14:47

## 2023-10-12 RX ADMIN — Medication: at 01:49

## 2023-10-12 RX ADMIN — POLYVINYL ALCOHOL 1 DROP: 14 SOLUTION/ DROPS OPHTHALMIC at 13:43

## 2023-10-12 RX ADMIN — ARFORMOTEROL TARTRATE 15 MCG: 15 SOLUTION RESPIRATORY (INHALATION) at 07:44

## 2023-10-12 RX ADMIN — AMIODARONE HYDROCHLORIDE 400 MG: 200 TABLET ORAL at 09:49

## 2023-10-12 RX ADMIN — BUDESONIDE 500 MCG: 0.5 INHALANT RESPIRATORY (INHALATION) at 07:44

## 2023-10-12 ASSESSMENT — PULMONARY FUNCTION TESTS
PIF_VALUE: 34
PIF_VALUE: 34
PIF_VALUE: 38
PIF_VALUE: 35
PIF_VALUE: 36
PIF_VALUE: 34
PIF_VALUE: 32
PIF_VALUE: 35
PIF_VALUE: 34
PIF_VALUE: 34
PIF_VALUE: 37
PIF_VALUE: 32
PIF_VALUE: 34
PIF_VALUE: 32
PIF_VALUE: 34

## 2023-10-12 ASSESSMENT — PAIN SCALES - GENERAL
PAINLEVEL_OUTOF10: 2
PAINLEVEL_OUTOF10: 0

## 2023-10-12 NOTE — PROGRESS NOTES
Pt turned and repositioned, during so was urinating around alvarado all over the bed pad. Alvarado was irrigated with 20 cc and 150 cc of urine dumped afterwards.

## 2023-10-12 NOTE — PLAN OF CARE
Problem: Pain  Goal: Verbalizes/displays adequate comfort level or baseline comfort level  10/11/2023 2153 by Sanford Orozco RN  Outcome: Progressing  Flowsheets (Taken 10/11/2023 2000)  Verbalizes/displays adequate comfort level or baseline comfort level:   Assess pain using appropriate pain scale   Administer analgesics based on type and severity of pain and evaluate response  10/11/2023 1618 by Rip Monaslve RN  Outcome: Progressing  Flowsheets (Taken 10/11/2023 1600)  Verbalizes/displays adequate comfort level or baseline comfort level: Encourage patient to monitor pain and request assistance  10/11/2023 1023 by Rip Monsalve RN  Outcome: Progressing  Flowsheets (Taken 10/11/2023 0800)  Verbalizes/displays adequate comfort level or baseline comfort level: Encourage patient to monitor pain and request assistance     Problem: ABCDS Injury Assessment  Goal: Absence of physical injury  10/11/2023 2153 by Sanford Orozco RN  Outcome: Progressing  Flowsheets (Taken 10/11/2023 2000)  Absence of Physical Injury: Implement safety measures based on patient assessment  10/11/2023 1618 by Rip Monsalve RN  Outcome: Progressing  Flowsheets (Taken 10/11/2023 1600)  Absence of Physical Injury: Implement safety measures based on patient assessment  10/11/2023 1023 by Rip Monsalve RN  Outcome: Progressing  Flowsheets (Taken 10/11/2023 0800)  Absence of Physical Injury: Implement safety measures based on patient assessment     Problem: Safety - Adult  Goal: Free from fall injury  10/11/2023 2153 by Sanford Orozco RN  Outcome: Progressing  Flowsheets (Taken 10/11/2023 2000)  Free From Fall Injury:   Instruct family/caregiver on patient safety   Based on caregiver fall risk screen, instruct family/caregiver to ask for assistance with transferring infant if caregiver noted to have fall risk factors  10/11/2023 1618 by Rip Monsalve RN  Outcome: Progressing  Flowsheets (Taken 10/11/2023 Every 4-6 hours:  If on nasal continuous positive airway pressure, respiratory therapy assess nares and determine need for appliance change or resting period.   10/11/2023 2153 by Tiffany Velazquez RN  Outcome: Progressing  10/11/2023 1618 by Day Fajardo RN  Outcome: Progressing  10/11/2023 1023 by Day Fajardo RN  Outcome: Progressing     Problem: Respiratory - Adult  Goal: Achieves optimal ventilation and oxygenation  10/11/2023 2153 by Tiffany Velazquez RN  Outcome: Progressing  Flowsheets (Taken 10/11/2023 2000)  Achieves optimal ventilation and oxygenation:   Assess for changes in respiratory status   Assess for changes in mentation and behavior   Position to facilitate oxygenation and minimize respiratory effort  10/11/2023 1618 by Day Fajardo RN  Outcome: Progressing  10/11/2023 1023 by Day Fajardo RN  Outcome: Progressing  Flowsheets (Taken 10/11/2023 0800)  Achieves optimal ventilation and oxygenation: Assess for changes in respiratory status     Problem: Discharge Planning  Goal: Discharge to home or other facility with appropriate resources  10/11/2023 2153 by Tiffany Velazquez RN  Outcome: Not Progressing  Flowsheets (Taken 10/11/2023 2000)  Discharge to home or other facility with appropriate resources:   Identify barriers to discharge with patient and caregiver   Arrange for needed discharge resources and transportation as appropriate   Identify discharge learning needs (meds, wound care, etc)  10/11/2023 1618 by Day Fajardo RN  Outcome: Progressing  Flowsheets (Taken 10/11/2023 1600)  Discharge to home or other facility with appropriate resources: Identify barriers to discharge with patient and caregiver  10/11/2023 1023 by Day Fajardo RN  Outcome: Progressing  Flowsheets (Taken 10/11/2023 0800)  Discharge to home or other facility with appropriate resources: Identify barriers to discharge with patient and caregiver     Problem: Nutrition Deficit:  Goal: Optimize nutritional status  10/11/2023 2153 by Eduardo Trotter RN  Outcome: Not Progressing  10/11/2023 1618 by Ricardo Meyer RN  Outcome: Progressing

## 2023-10-12 NOTE — PLAN OF CARE
Problem: Discharge Planning  Goal: Discharge to home or other facility with appropriate resources  Outcome: Progressing  Flowsheets (Taken 10/12/2023 0800)  Discharge to home or other facility with appropriate resources: Identify barriers to discharge with patient and caregiver     Problem: Pain  Goal: Verbalizes/displays adequate comfort level or baseline comfort level  Outcome: Progressing     Problem: ABCDS Injury Assessment  Goal: Absence of physical injury  Outcome: Progressing  Flowsheets (Taken 10/12/2023 1400)  Absence of Physical Injury: Implement safety measures based on patient assessment     Problem: Safety - Adult  Goal: Free from fall injury  Outcome: Progressing  Flowsheets (Taken 10/12/2023 1400)  Free From Fall Injury: Instruct family/caregiver on patient safety     Problem: Safety - Medical Restraint  Goal: Remains free of injury from restraints (Restraint for Interference with Medical Device)  Description: INTERVENTIONS:  1. Determine that other, less restrictive measures have been tried or would not be effective before applying the restraint  2. Evaluate the patient's condition at the time of restraint application  3. Inform patient/family regarding the reason for restraint  4.  Q2H: Monitor safety, psychosocial status, comfort, nutrition and hydration  Outcome: Progressing  Flowsheets  Taken 10/12/2023 0800 by Nolan Oropeza, [address] [place] [address] free of injury from restraints (restraint for interference with medical device): Determine that other, less restrictive measures have been tried or would not be effective before applying the restraint  Taken 10/12/2023 0600 by Estrada French RN  Remains free of injury from restraints (restraint for interference with medical device):   Every 2 hours: Monitor safety, psychosocial status, comfort, nutrition and hydration   Determine that other, less restrictive measures have been tried or would not be effective before applying the restraint  Taken 10/12/2023 0400 by Gearlean Schwab, RN  Remains free of injury from restraints (restraint for interference with medical device):   Every 2 hours: Monitor safety, psychosocial status, comfort, nutrition and hydration   Determine that other, less restrictive measures have been tried or would not be effective before applying the restraint  Taken 10/12/2023 0200 by Gearlean Schwab, RN  Remains free of injury from restraints (restraint for interference with medical device):   Every 2 hours: Monitor safety, psychosocial status, comfort, nutrition and hydration   Determine that other, less restrictive measures have been tried or would not be effective before applying the restraint     Problem: Skin/Tissue Integrity  Goal: Absence of new skin breakdown  Description: 1. Monitor for areas of redness and/or skin breakdown  2. Assess vascular access sites hourly  3. Every 4-6 hours minimum:  Change oxygen saturation probe site  4. Every 4-6 hours:  If on nasal continuous positive airway pressure, respiratory therapy assess nares and determine need for appliance change or resting period.   Outcome: Progressing     Problem: Respiratory - Adult  Goal: Achieves optimal ventilation and oxygenation  Outcome: Progressing  Flowsheets (Taken 10/12/2023 0800)  Achieves optimal ventilation and oxygenation: Assess for changes in respiratory status     Problem: Nutrition Deficit:  Goal: Optimize nutritional status  Outcome: Progressing

## 2023-10-12 NOTE — PROGRESS NOTES
Hospitalist Progress Note      PCP: Leopoldo Perkins DO    Date of Admission: 10/8/2023        Hospital Course:   PT RECENTLY, HAD A  kidney biopsy and evaluation for lupus. Patient states after the biopsy he became short of breath. This is worse with exertion as well as lying flat. Patient does currently utilize 2 L O2 at night when needed. Patient was found to be hypoxic 75% on room air.    FOUND TO HAVE A RIGHT RETROPERITONEAL HEMATOMA,  HAD IR   PRODECURE** SEDATED AND INTUBATED        Subjective:  WIFE AT BEDSIDE, AWAITING [facility] TRANSFER           Medications:  Reviewed    Infusion Medications    dexmedetomidine (PRECEDEX) 1,000 mcg in sodium chloride 0.9 % 250 mL infusion Stopped (10/11/23 1422)    heparin (PORCINE) Infusion 10 Units/kg/hr (10/12/23 1137)    midazolam 9 mg/hr (10/12/23 1135)    fentaNYL 150 mcg/hr (10/12/23 0620)    dextrose       Scheduled Medications    dexamethasone  20 mg IntraVENous Q24H    Followed by    Bryant Mcfarland ON 10/15/2023] dexamethasone  10 mg IntraVENous Q24H    metoprolol tartrate  25 mg Oral BID    polyethylene glycol  17 g Oral BID    sennosides-docusate sodium  2 tablet Oral BID    amiodarone  400 mg Oral BID    chlorhexidine  15 mL Mouth/Throat BID    fentanNYL  100 mcg IntraVENous Once    piperacillin-tazobactam  4,500 mg IntraVENous Q8H    pantoprazole (PROTONIX) 40 mg in sodium chloride (PF) 0.9 % 10 mL injection  40 mg IntraVENous Daily    levETIRAcetam  500 mg IntraVENous Q12H    polyvinyl alcohol  1 drop Both Eyes Q2H    arformoterol tartrate  15 mcg Nebulization BID RT    budesonide  0.5 mg Nebulization BID RT    insulin lispro  0-4 Units SubCUTAneous Q4H    [Held by provider] metoprolol succinate  100 mg Oral Daily    [Held by provider] amLODIPine  5 mg Oral Daily    [Held by provider] Brivaracetam  50 mg Oral BID    vitamin D3  5,000 Units Oral Every Other Day    sodium chloride flush  5-40 mL IntraVENous 2 times per day     PRN Meds: medicated lip balm,

## 2023-10-13 ENCOUNTER — APPOINTMENT (OUTPATIENT)
Dept: GENERAL RADIOLOGY | Age: 53
DRG: 811 | End: 2023-10-13
Payer: COMMERCIAL

## 2023-10-13 VITALS
HEART RATE: 106 BPM | BODY MASS INDEX: 32.58 KG/M2 | SYSTOLIC BLOOD PRESSURE: 120 MMHG | OXYGEN SATURATION: 92 % | WEIGHT: 262 LBS | TEMPERATURE: 99.3 F | RESPIRATION RATE: 23 BRPM | DIASTOLIC BLOOD PRESSURE: 92 MMHG | HEIGHT: 75 IN

## 2023-10-13 LAB
AADO2: 220.3 MMHG
ANION GAP SERPL CALCULATED.3IONS-SCNC: 14 MMOL/L (ref 7–16)
ANION GAP SERPL CALCULATED.3IONS-SCNC: 17 MMOL/L (ref 7–16)
B.E.: -4.7 MMOL/L (ref -3–3)
BASOPHILS # BLD: 0 K/UL (ref 0–0.2)
BASOPHILS NFR BLD: 0 % (ref 0–2)
BUN SERPL-MCNC: 106 MG/DL (ref 6–20)
BUN SERPL-MCNC: 111 MG/DL (ref 6–20)
CALCIUM SERPL-MCNC: 8.1 MG/DL (ref 8.6–10.2)
CALCIUM SERPL-MCNC: 8.5 MG/DL (ref 8.6–10.2)
CHLORIDE SERPL-SCNC: 103 MMOL/L (ref 98–107)
CHLORIDE SERPL-SCNC: 107 MMOL/L (ref 98–107)
CO2 SERPL-SCNC: 18 MMOL/L (ref 22–29)
CO2 SERPL-SCNC: 19 MMOL/L (ref 22–29)
COHB: 0.7 % (ref 0–1.5)
CREAT SERPL-MCNC: 4.8 MG/DL (ref 0.7–1.2)
CREAT SERPL-MCNC: 5 MG/DL (ref 0.7–1.2)
CRITICAL: ABNORMAL
DATE ANALYZED: ABNORMAL
DATE OF COLLECTION: ABNORMAL
EOSINOPHIL # BLD: 0 K/UL (ref 0.05–0.5)
EOSINOPHILS RELATIVE PERCENT: 0 % (ref 0–6)
ERYTHROCYTE [DISTWIDTH] IN BLOOD BY AUTOMATED COUNT: 18.7 % (ref 11.5–15)
FIO2: 50 %
GALACTOMANNAN AG BAL QL: NEGATIVE
GALACTOMANNAN AG SPEC IA-ACNC: 0.15
GFR SERPL CREATININE-BSD FRML MDRD: 13 ML/MIN/1.73M2
GFR SERPL CREATININE-BSD FRML MDRD: 14 ML/MIN/1.73M2
GLUCOSE BLD-MCNC: 183 MG/DL (ref 74–99)
GLUCOSE BLD-MCNC: 190 MG/DL (ref 74–99)
GLUCOSE BLD-MCNC: 219 MG/DL (ref 74–99)
GLUCOSE BLD-MCNC: 221 MG/DL (ref 74–99)
GLUCOSE SERPL-MCNC: 202 MG/DL (ref 74–99)
GLUCOSE SERPL-MCNC: 213 MG/DL (ref 74–99)
HCO3: 19.8 MMOL/L (ref 22–26)
HCT VFR BLD AUTO: 25.9 % (ref 37–54)
HGB BLD-MCNC: 8.4 G/DL (ref 12.5–16.5)
HHB: 5.4 % (ref 0–5)
INR PPP: 1.4
LAB: ABNORMAL
LYMPHOCYTES NFR BLD: 0.75 K/UL (ref 1.5–4)
LYMPHOCYTES RELATIVE PERCENT: 6 % (ref 20–42)
Lab: 422
MAGNESIUM SERPL-MCNC: 2.9 MG/DL (ref 1.6–2.6)
MAGNESIUM SERPL-MCNC: 3 MG/DL (ref 1.6–2.6)
MCH RBC QN AUTO: 26.3 PG (ref 26–35)
MCHC RBC AUTO-ENTMCNC: 32.4 G/DL (ref 32–34.5)
MCV RBC AUTO: 81.2 FL (ref 80–99.9)
METAMYELOCYTES ABSOLUTE COUNT: 0.11 K/UL (ref 0–0.12)
METAMYELOCYTES: 1 % (ref 0–1)
METHB: 0.1 % (ref 0–1.5)
MICROORGANISM SPEC CULT: NORMAL
MODE: AC
MONOCYTES NFR BLD: 0.32 K/UL (ref 0.1–0.95)
MONOCYTES NFR BLD: 3 % (ref 2–12)
NEUTROPHILS NFR BLD: 90 % (ref 43–80)
NEUTS SEG NFR BLD: 11.12 K/UL (ref 1.8–7.3)
NUCLEATED RED BLOOD CELLS: 4 PER 100 WBC
O2 CONTENT: 12.4 ML/DL
O2 SATURATION: 94.6 % (ref 92–98.5)
O2HB: 93.8 % (ref 94–97)
OPERATOR ID: 2593
PARTIAL THROMBOPLASTIN TIME: 59.7 SEC (ref 24.5–35.1)
PATIENT TEMP: 37 C
PCO2: 34.2 MMHG (ref 35–45)
PEEP/CPAP: 10 CMH2O
PFO2: 1.7 MMHG/%
PH BLOOD GAS: 7.38 (ref 7.35–7.45)
PHOSPHATE SERPL-MCNC: 6.9 MG/DL (ref 2.5–4.5)
PHOSPHATE SERPL-MCNC: 7 MG/DL (ref 2.5–4.5)
PLATELET # BLD AUTO: 117 K/UL (ref 130–450)
PMV BLD AUTO: 11.2 FL (ref 7–12)
PO2: 85.2 MMHG (ref 75–100)
POTASSIUM SERPL-SCNC: 4.9 MMOL/L (ref 3.5–5)
POTASSIUM SERPL-SCNC: 5 MMOL/L (ref 3.5–5)
PROTHROMBIN TIME: 15.3 SEC (ref 9.3–12.4)
RBC # BLD AUTO: 3.19 M/UL (ref 3.8–5.8)
RBC # BLD: ABNORMAL 10*6/UL
RI(T): 2.59
RR MECHANICAL: 22 B/MIN
SERVICE CMNT-IMP: NORMAL
SERVICE CMNT-IMP: NORMAL
SODIUM SERPL-SCNC: 138 MMOL/L (ref 132–146)
SODIUM SERPL-SCNC: 140 MMOL/L (ref 132–146)
SOURCE, BLOOD GAS: ABNORMAL
SPECIMEN DESCRIPTION: NORMAL
THB: 9.3 G/DL (ref 11.5–16.5)
TIME ANALYZED: 424
VT MECHANICAL: 450 ML
WBC OTHER # BLD: 12.3 K/UL (ref 4.5–11.5)

## 2023-10-13 PROCEDURE — 80048 BASIC METABOLIC PNL TOTAL CA: CPT

## 2023-10-13 PROCEDURE — 94640 AIRWAY INHALATION TREATMENT: CPT

## 2023-10-13 PROCEDURE — 6360000002 HC RX W HCPCS

## 2023-10-13 PROCEDURE — 85025 COMPLETE CBC W/AUTO DIFF WBC: CPT

## 2023-10-13 PROCEDURE — 2500000003 HC RX 250 WO HCPCS: Performed by: STUDENT IN AN ORGANIZED HEALTH CARE EDUCATION/TRAINING PROGRAM

## 2023-10-13 PROCEDURE — 6360000002 HC RX W HCPCS: Performed by: STUDENT IN AN ORGANIZED HEALTH CARE EDUCATION/TRAINING PROGRAM

## 2023-10-13 PROCEDURE — 2580000003 HC RX 258

## 2023-10-13 PROCEDURE — 2500000003 HC RX 250 WO HCPCS: Performed by: INTERNAL MEDICINE

## 2023-10-13 PROCEDURE — 85610 PROTHROMBIN TIME: CPT

## 2023-10-13 PROCEDURE — 6370000000 HC RX 637 (ALT 250 FOR IP): Performed by: STUDENT IN AN ORGANIZED HEALTH CARE EDUCATION/TRAINING PROGRAM

## 2023-10-13 PROCEDURE — 94003 VENT MGMT INPAT SUBQ DAY: CPT

## 2023-10-13 PROCEDURE — 82805 BLOOD GASES W/O2 SATURATION: CPT

## 2023-10-13 PROCEDURE — 83735 ASSAY OF MAGNESIUM: CPT

## 2023-10-13 PROCEDURE — 84100 ASSAY OF PHOSPHORUS: CPT

## 2023-10-13 PROCEDURE — 82962 GLUCOSE BLOOD TEST: CPT

## 2023-10-13 PROCEDURE — 85730 THROMBOPLASTIN TIME PARTIAL: CPT

## 2023-10-13 PROCEDURE — 2580000003 HC RX 258: Performed by: STUDENT IN AN ORGANIZED HEALTH CARE EDUCATION/TRAINING PROGRAM

## 2023-10-13 PROCEDURE — 73060 X-RAY EXAM OF HUMERUS: CPT

## 2023-10-13 PROCEDURE — 71045 X-RAY EXAM CHEST 1 VIEW: CPT

## 2023-10-13 PROCEDURE — 6370000000 HC RX 637 (ALT 250 FOR IP)

## 2023-10-13 PROCEDURE — 51702 INSERT TEMP BLADDER CATH: CPT

## 2023-10-13 PROCEDURE — C9113 INJ PANTOPRAZOLE SODIUM, VIA: HCPCS | Performed by: STUDENT IN AN ORGANIZED HEALTH CARE EDUCATION/TRAINING PROGRAM

## 2023-10-13 PROCEDURE — A4216 STERILE WATER/SALINE, 10 ML: HCPCS | Performed by: STUDENT IN AN ORGANIZED HEALTH CARE EDUCATION/TRAINING PROGRAM

## 2023-10-13 PROCEDURE — 80074 ACUTE HEPATITIS PANEL: CPT

## 2023-10-13 PROCEDURE — 99291 CRITICAL CARE FIRST HOUR: CPT | Performed by: INTERNAL MEDICINE

## 2023-10-13 PROCEDURE — 6360000002 HC RX W HCPCS: Performed by: INTERNAL MEDICINE

## 2023-10-13 RX ORDER — MIDAZOLAM HYDROCHLORIDE 1 MG/ML
1-10 INJECTION, SOLUTION INTRAVENOUS CONTINUOUS
Status: CANCELLED | OUTPATIENT
Start: 2023-10-13

## 2023-10-13 RX ORDER — METOPROLOL TARTRATE 5 MG/5ML
5 INJECTION INTRAVENOUS ONCE
Status: COMPLETED | OUTPATIENT
Start: 2023-10-13 | End: 2023-10-13

## 2023-10-13 RX ADMIN — ARFORMOTEROL TARTRATE 15 MCG: 15 SOLUTION RESPIRATORY (INHALATION) at 08:47

## 2023-10-13 RX ADMIN — POLYVINYL ALCOHOL 1 DROP: 14 SOLUTION/ DROPS OPHTHALMIC at 05:15

## 2023-10-13 RX ADMIN — Medication 200 MCG/HR: at 08:18

## 2023-10-13 RX ADMIN — Medication 6 MG/HR: at 04:06

## 2023-10-13 RX ADMIN — INSULIN LISPRO 1 UNITS: 100 INJECTION, SOLUTION INTRAVENOUS; SUBCUTANEOUS at 05:09

## 2023-10-13 RX ADMIN — INSULIN LISPRO 1 UNITS: 100 INJECTION, SOLUTION INTRAVENOUS; SUBCUTANEOUS at 13:44

## 2023-10-13 RX ADMIN — PIPERACILLIN AND TAZOBACTAM 4500 MG: 4; .5 INJECTION, POWDER, LYOPHILIZED, FOR SOLUTION INTRAVENOUS at 15:10

## 2023-10-13 RX ADMIN — POLYVINYL ALCOHOL 1 DROP: 14 SOLUTION/ DROPS OPHTHALMIC at 16:47

## 2023-10-13 RX ADMIN — BUDESONIDE 500 MCG: 0.5 INHALANT RESPIRATORY (INHALATION) at 19:32

## 2023-10-13 RX ADMIN — ARFORMOTEROL TARTRATE 15 MCG: 15 SOLUTION RESPIRATORY (INHALATION) at 19:31

## 2023-10-13 RX ADMIN — SENNOSIDES AND DOCUSATE SODIUM 2 TABLET: 50; 8.6 TABLET ORAL at 08:26

## 2023-10-13 RX ADMIN — INSULIN LISPRO 2 UNITS: 100 INJECTION, SOLUTION INTRAVENOUS; SUBCUTANEOUS at 16:40

## 2023-10-13 RX ADMIN — CHLORHEXIDINE GLUCONATE 15 ML: 1.2 RINSE ORAL at 08:26

## 2023-10-13 RX ADMIN — POLYVINYL ALCOHOL 1 DROP: 14 SOLUTION/ DROPS OPHTHALMIC at 13:07

## 2023-10-13 RX ADMIN — POLYVINYL ALCOHOL 1 DROP: 14 SOLUTION/ DROPS OPHTHALMIC at 15:10

## 2023-10-13 RX ADMIN — DEXMEDETOMIDINE 0.2 MCG/KG/HR: 100 INJECTION, SOLUTION INTRAVENOUS at 10:25

## 2023-10-13 RX ADMIN — LEVETIRACETAM 500 MG: 100 INJECTION, SOLUTION INTRAVENOUS at 11:43

## 2023-10-13 RX ADMIN — METOPROLOL TARTRATE 5 MG: 5 INJECTION INTRAVENOUS at 13:07

## 2023-10-13 RX ADMIN — PIPERACILLIN AND TAZOBACTAM 4500 MG: 4; .5 INJECTION, POWDER, LYOPHILIZED, FOR SOLUTION INTRAVENOUS at 06:01

## 2023-10-13 RX ADMIN — DEXAMETHASONE SODIUM PHOSPHATE 20 MG: 4 INJECTION, SOLUTION INTRAMUSCULAR; INTRAVENOUS at 10:27

## 2023-10-13 RX ADMIN — AMIODARONE HYDROCHLORIDE 400 MG: 200 TABLET ORAL at 08:25

## 2023-10-13 RX ADMIN — POLYVINYL ALCOHOL 1 DROP: 14 SOLUTION/ DROPS OPHTHALMIC at 02:25

## 2023-10-13 RX ADMIN — POLYETHYLENE GLYCOL 3350 17 G: 17 POWDER, FOR SOLUTION ORAL at 08:26

## 2023-10-13 RX ADMIN — POLYVINYL ALCOHOL 1 DROP: 14 SOLUTION/ DROPS OPHTHALMIC at 08:19

## 2023-10-13 RX ADMIN — METOPROLOL TARTRATE 25 MG: 25 TABLET, FILM COATED ORAL at 08:26

## 2023-10-13 RX ADMIN — POLYVINYL ALCOHOL 1 DROP: 14 SOLUTION/ DROPS OPHTHALMIC at 18:12

## 2023-10-13 RX ADMIN — BUDESONIDE 500 MCG: 0.5 INHALANT RESPIRATORY (INHALATION) at 08:47

## 2023-10-13 RX ADMIN — Medication 175 MCG/HR: at 01:59

## 2023-10-13 RX ADMIN — POLYVINYL ALCOHOL 1 DROP: 14 SOLUTION/ DROPS OPHTHALMIC at 10:31

## 2023-10-13 RX ADMIN — Medication 10 ML: at 09:32

## 2023-10-13 RX ADMIN — Medication 150 MCG/HR: at 16:38

## 2023-10-13 RX ADMIN — POLYVINYL ALCOHOL 1 DROP: 14 SOLUTION/ DROPS OPHTHALMIC at 02:28

## 2023-10-13 RX ADMIN — PANTOPRAZOLE SODIUM 40 MG: 40 INJECTION, POWDER, FOR SOLUTION INTRAVENOUS at 08:26

## 2023-10-13 RX ADMIN — INSULIN LISPRO 2 UNITS: 100 INJECTION, SOLUTION INTRAVENOUS; SUBCUTANEOUS at 08:36

## 2023-10-13 ASSESSMENT — PULMONARY FUNCTION TESTS
PIF_VALUE: 33
PIF_VALUE: 26
PIF_VALUE: 39
PIF_VALUE: 43
PIF_VALUE: 33
PIF_VALUE: 33
PIF_VALUE: 45
PIF_VALUE: 27
PIF_VALUE: 36
PIF_VALUE: 28
PIF_VALUE: 33
PIF_VALUE: 37
PIF_VALUE: 26
PIF_VALUE: 25

## 2023-10-13 ASSESSMENT — PAIN SCALES - GENERAL
PAINLEVEL_OUTOF10: 0
PAINLEVEL_OUTOF10: 0

## 2023-10-13 ASSESSMENT — PAIN SCALES - WONG BAKER: WONGBAKER_NUMERICALRESPONSE: 0

## 2023-10-13 NOTE — CARE COORDINATION
Care Coordination:  LOS 5 day. Remains intubated, Versed, Heparin gtt, NGT/TF. Spoke to [address] at [facility], confirmed transfer request in process. Pt has been accepted, awaiting bed, they will check again with [facility] at 3 pm for updates.  Ambulance transport form completed    Electronically signed by Wellington Caballero RN on 10/13/2023 at 12:53 PM

## 2023-10-13 NOTE — PROGRESS NOTES
Sedation holiday started at 0905, continuous drips on hold.  Pt not yet following commands, will continue to monitor and report changes

## 2023-10-13 NOTE — PROGRESS NOTES
[facility] called and updated on current status, still awaiting bed assignment.

## 2023-10-13 NOTE — PROGRESS NOTES
DAILY VENTILATOR WEANING ASSESSMENT PERFORMED    P/FIO2 Ratio = 170         (<100= do not Wean)                  Cs =39                          (<32= Instability)  Plat. Pressure = 23  MV =10.3  RSBI =    Instabilities:       Cardiovascular =       CNS =       Respiratory =Peep = 10       Metabolic =    Parameters    no    Wean per protocol  no    Ask Physician for a weaning plan yes    Additional Comments:     Performed by Katharina [place] RCP RRT      Reference Table:    Cardiovascular     CNS      1. Mean BP less than or equal to 75   1. Neuromuscular blockade  2. Heart Rate greater than 130   2. RASS of -3, -4, -5  3. Myocardial Ischemia    3. RASS of +3, +4  4. Mechanical Assist Device    4. ICP greater than 15 or             Intracranial Hypertension         Respiratory      Metabolic  1. PEEP equal to or greater than 10cm/H20  1. Temp. (8hrs) less than 95 or > 103  2. Respiratory Rate greater than 35   2. WBC < 5000 or > 30688  3. Minute Volume greater than 15L  4. pH less than 7.30  5.  Deteriorating chest X-ray

## 2023-10-13 NOTE — PLAN OF CARE
Problem: Discharge Planning  Goal: Discharge to home or other facility with appropriate resources  Outcome: Not Progressing  Flowsheets (Taken 10/13/2023 0800)  Discharge to home or other facility with appropriate resources: Identify barriers to discharge with patient and caregiver     Problem: Pain  Goal: Verbalizes/displays adequate comfort level or baseline comfort level  Outcome: Not Progressing     Problem: ABCDS Injury Assessment  Goal: Absence of physical injury  Outcome: Not Progressing  Flowsheets (Taken 10/13/2023 1450)  Absence of Physical Injury: Implement safety measures based on patient assessment     Problem: Safety - Adult  Goal: Free from fall injury  Outcome: Not Progressing  Flowsheets (Taken 10/13/2023 1450)  Free From Fall Injury: Instruct family/caregiver on patient safety     Problem: Safety - Medical Restraint  Goal: Remains free of injury from restraints (Restraint for Interference with Medical Device)  Description: INTERVENTIONS:  1. Determine that other, less restrictive measures have been tried or would not be effective before applying the restraint  2. Evaluate the patient's condition at the time of restraint application  3. Inform patient/family regarding the reason for restraint  4.  Q2H: Monitor safety, psychosocial status, comfort, nutrition and hydration  Outcome: Not Progressing  Flowsheets  Taken 10/13/2023 1400 by Nolan Oropeza, [address] [place] [address] free of injury from restraints (restraint for interference with medical device):   Determine that other, less restrictive measures have been tried or would not be effective before applying the restraint   Evaluate the patient's condition at the time of restraint application  Taken [id number] 0600 by Estrada French RN  Remains free of injury from restraints (restraint for interference with medical device):   Every 2 hours: Monitor safety, psychosocial status, comfort, nutrition and hydration   Determine that other, less restrictive

## 2023-10-13 NOTE — PLAN OF CARE
Problem: Discharge Planning  Goal: Discharge to home or other facility with appropriate resources  10/12/2023 2038 by Porsche Foreman RN  Outcome: Progressing  Flowsheets (Taken 10/12/2023 2000)  Discharge to home or other facility with appropriate resources:   Identify barriers to discharge with patient and caregiver   Arrange for needed discharge resources and transportation as appropriate   Identify discharge learning needs (meds, wound care, etc)   Arrange for interpreters to assist at discharge as needed  10/12/2023 1414 by Kim Oliver RN  Outcome: Progressing  Flowsheets (Taken 10/12/2023 0800)  Discharge to home or other facility with appropriate resources: Identify barriers to discharge with patient and caregiver     Problem: Pain  Goal: Verbalizes/displays adequate comfort level or baseline comfort level  10/12/2023 2038 by Porsche Foreman RN  Outcome: Progressing  Flowsheets (Taken 10/12/2023 2000)  Verbalizes/displays adequate comfort level or baseline comfort level:   Assess pain using appropriate pain scale   Administer analgesics based on type and severity of pain and evaluate response  10/12/2023 1414 by Kim Oliver RN  Outcome: Progressing     Problem: ABCDS Injury Assessment  Goal: Absence of physical injury  10/12/2023 2038 by Porsche Foreman RN  Outcome: Progressing  Flowsheets (Taken 10/12/2023 2000)  Absence of Physical Injury: Implement safety measures based on patient assessment  10/12/2023 1414 by Kim Oliver RN  Outcome: Progressing  Flowsheets (Taken 10/12/2023 1400)  Absence of Physical Injury: Implement safety measures based on patient assessment     Problem: Safety - Adult  Goal: Free from fall injury  10/12/2023 2038 by Porsche Foreman RN  Outcome: Progressing  Flowsheets (Taken 10/12/2023 2000)  Free From Fall Injury:   Instruct family/caregiver on patient safety   Based on caregiver fall risk screen, instruct family/caregiver to ask for assistance with transferring infant if caregiver noted to have fall risk factors  10/12/2023 1414 by Xena Solomon RN  Outcome: Progressing  Flowsheets (Taken 10/12/2023 1400)  Free From Fall Injury: Instruct family/caregiver on patient safety     Problem: Safety - Medical Restraint  Goal: Remains free of injury from restraints (Restraint for Interference with Medical Device)  Description: INTERVENTIONS:  1. Determine that other, less restrictive measures have been tried or would not be effective before applying the restraint  2. Evaluate the patient's condition at the time of restraint application  3. Inform patient/family regarding the reason for restraint  4.  Q2H: Monitor safety, psychosocial status, comfort, nutrition and hydration  10/12/2023 2038 by Lianet Stephenson RN  Outcome: Progressing  Flowsheets (Taken 10/12/2023 2000)  Remains free of injury from restraints (restraint for interference with medical device):   Every 2 hours: Monitor safety, psychosocial status, comfort, nutrition and hydration   Determine that other, less restrictive measures have been tried or would not be effective before applying the restraint  10/12/2023 1414 by Xena Solomon RN  Outcome: Progressing  Flowsheets  Taken 10/12/2023 0800 by Xena Solomon, [address] [facility] [place] free of injury from restraints (restraint for interference with medical device): Determine that other, less restrictive measures have been tried or would not be effective before applying the restraint  Taken 10/12/2023 0600 by Lianet Stephenson RN  Remains free of injury from restraints (restraint for interference with medical device):   Every 2 hours: Monitor safety, psychosocial status, comfort, nutrition and hydration   Determine that other, less restrictive measures have been tried or would not be effective before applying the restraint  Taken 10/12/2023 0400 by Lianet Stephenson RN  Remains free of injury from restraints (restraint for interference with medical device):   Every 2 hours:

## 2023-10-14 LAB
MICROORGANISM SPEC CULT: NORMAL
MICROORGANISM/AGENT SPEC: NORMAL
SPECIMEN DESCRIPTION: NORMAL

## 2023-10-16 LAB
HAV IGM SERPL QL IA: NONREACTIVE
HBV CORE IGM SERPL QL IA: NONREACTIVE
HBV SURFACE AG SERPL QL IA: NONREACTIVE
HCV AB SERPL QL IA: NONREACTIVE
MICROORGANISM SPEC CULT: NORMAL
SPECIMEN DESCRIPTION: NORMAL

## 2023-10-22 LAB
MICROORGANISM SPEC CULT: NORMAL
MICROORGANISM/AGENT SPEC: NORMAL
SPECIMEN DESCRIPTION: NORMAL

## 2023-10-29 LAB
MICROORGANISM SPEC CULT: NORMAL
MICROORGANISM/AGENT SPEC: NORMAL
SPECIMEN DESCRIPTION: NORMAL

## 2023-11-05 LAB
MICROORGANISM SPEC CULT: NORMAL
MICROORGANISM/AGENT SPEC: NORMAL
SPECIMEN DESCRIPTION: NORMAL

## 2023-11-09 LAB
MICROORGANISM SPEC CULT: NORMAL
MICROORGANISM/AGENT SPEC: NORMAL
SPECIMEN DESCRIPTION: NORMAL

## 2023-11-13 ENCOUNTER — TELEPHONE (OUTPATIENT)
Dept: HEMATOLOGY | Age: 53
End: 2023-11-13

## 2023-11-13 NOTE — TELEPHONE ENCOUNTER
MRI Abd  Approved through [address] ID [id number]  Valid 11/2/23 to 12/1/23    Kameron Zacarias is scheduled [facility] 12/5/23, arrive 8 am, Scan 830 am, NPO 4-6 hours. Called and LVM for patient to return call to clinic regarding his scheduled scan.

## 2023-11-16 NOTE — TELEPHONE ENCOUNTER
Parveenrebecca Siobhan called office and reported that he is currently inpatient at another facility and does not wish to complete the MRI at this time. He stated he will call office once he has been discharged.

## 2024-12-10 ENCOUNTER — APPOINTMENT (OUTPATIENT)
Dept: CT IMAGING | Age: 54
End: 2024-12-10
Payer: COMMERCIAL

## 2024-12-10 ENCOUNTER — HOSPITAL ENCOUNTER (EMERGENCY)
Age: 54
Discharge: HOME | End: 2024-12-11
Attending: EMERGENCY MEDICINE
Payer: COMMERCIAL

## 2024-12-10 ENCOUNTER — APPOINTMENT (OUTPATIENT)
Dept: GENERAL RADIOLOGY | Age: 54
End: 2024-12-10
Payer: COMMERCIAL

## 2024-12-10 VITALS
TEMPERATURE: 97.4 F | OXYGEN SATURATION: 99 % | DIASTOLIC BLOOD PRESSURE: 80 MMHG | RESPIRATION RATE: 18 BRPM | SYSTOLIC BLOOD PRESSURE: 133 MMHG | HEART RATE: 74 BPM

## 2024-12-10 DIAGNOSIS — R10.30 LOWER ABDOMINAL PAIN: Primary | ICD-10-CM

## 2024-12-10 DIAGNOSIS — N30.00 ACUTE CYSTITIS WITHOUT HEMATURIA: ICD-10-CM

## 2024-12-10 LAB
ALBUMIN SERPL-MCNC: 3.6 G/DL (ref 3.5–5.2)
ALP SERPL-CCNC: 66 U/L (ref 40–129)
ALT SERPL-CCNC: 7 U/L (ref 0–40)
AMYLASE SERPL-CCNC: 98 U/L (ref 20–100)
ANION GAP SERPL CALCULATED.3IONS-SCNC: 16 MMOL/L (ref 7–16)
AST SERPL-CCNC: 9 U/L (ref 0–39)
BACTERIA URNS QL MICRO: ABNORMAL
BILIRUB DIRECT SERPL-MCNC: <0.2 MG/DL (ref 0–0.3)
BILIRUB INDIRECT SERPL-MCNC: ABNORMAL MG/DL (ref 0–1)
BILIRUB SERPL-MCNC: 0.2 MG/DL (ref 0–1.2)
BILIRUB UR QL STRIP: NEGATIVE
BNP SERPL-MCNC: 326 PG/ML (ref 0–125)
BUN SERPL-MCNC: 15 MG/DL (ref 6–20)
CALCIUM SERPL-MCNC: 9.2 MG/DL (ref 8.6–10.2)
CHLORIDE SERPL-SCNC: 101 MMOL/L (ref 98–107)
CK SERPL-CCNC: 35 U/L (ref 20–200)
CLARITY UR: CLEAR
CO2 SERPL-SCNC: 18 MMOL/L (ref 22–29)
COLOR UR: YELLOW
CREAT SERPL-MCNC: 1.2 MG/DL (ref 0.7–1.2)
D-DIMER QUANTITATIVE: <200 NG/ML DDU (ref 0–230)
ERYTHROCYTE [DISTWIDTH] IN BLOOD BY AUTOMATED COUNT: 15.8 % (ref 11.5–15)
GFR, ESTIMATED: 75 ML/MIN/1.73M2
GLUCOSE SERPL-MCNC: 107 MG/DL (ref 74–99)
GLUCOSE UR STRIP-MCNC: NEGATIVE MG/DL
HCT VFR BLD AUTO: 30.9 % (ref 37–54)
HGB BLD-MCNC: 10.2 G/DL (ref 12.5–16.5)
HGB UR QL STRIP.AUTO: ABNORMAL
INR PPP: 2
KETONES UR STRIP-MCNC: NEGATIVE MG/DL
LACTATE BLDV-SCNC: 1.4 MMOL/L (ref 0.5–2.2)
LEUKOCYTE ESTERASE UR QL STRIP: ABNORMAL
LIPASE SERPL-CCNC: 41 U/L (ref 13–60)
MAGNESIUM SERPL-MCNC: 1.6 MG/DL (ref 1.6–2.6)
MCH RBC QN AUTO: 26.2 PG (ref 26–35)
MCHC RBC AUTO-ENTMCNC: 33 G/DL (ref 32–34.5)
MCV RBC AUTO: 79.2 FL (ref 80–99.9)
NITRITE UR QL STRIP: POSITIVE
PH UR STRIP: 5.5 [PH] (ref 5–9)
PLATELET # BLD AUTO: 236 K/UL (ref 130–450)
PMV BLD AUTO: 9.7 FL (ref 7–12)
POTASSIUM SERPL-SCNC: 3.8 MMOL/L (ref 3.5–5)
PROT SERPL-MCNC: 5.8 G/DL (ref 6.4–8.3)
PROT UR STRIP-MCNC: NEGATIVE MG/DL
PROTHROMBIN TIME: 22.1 SEC (ref 9.3–12.4)
RBC # BLD AUTO: 3.9 M/UL (ref 3.8–5.8)
RBC #/AREA URNS HPF: ABNORMAL /HPF
SODIUM SERPL-SCNC: 135 MMOL/L (ref 132–146)
SP GR UR STRIP: <1.005 (ref 1–1.03)
TROPONIN I SERPL HS-MCNC: 32 NG/L (ref 0–11)
UROBILINOGEN UR STRIP-ACNC: 0.2 EU/DL (ref 0–1)
WBC #/AREA URNS HPF: ABNORMAL /HPF
WBC OTHER # BLD: 7.9 K/UL (ref 4.5–11.5)

## 2024-12-10 PROCEDURE — 87086 URINE CULTURE/COLONY COUNT: CPT

## 2024-12-10 PROCEDURE — 6360000002 HC RX W HCPCS: Performed by: EMERGENCY MEDICINE

## 2024-12-10 PROCEDURE — 74176 CT ABD & PELVIS W/O CONTRAST: CPT

## 2024-12-10 PROCEDURE — 83735 ASSAY OF MAGNESIUM: CPT

## 2024-12-10 PROCEDURE — 71045 X-RAY EXAM CHEST 1 VIEW: CPT

## 2024-12-10 PROCEDURE — 2500000003 HC RX 250 WO HCPCS: Performed by: EMERGENCY MEDICINE

## 2024-12-10 PROCEDURE — 96361 HYDRATE IV INFUSION ADD-ON: CPT

## 2024-12-10 PROCEDURE — 93005 ELECTROCARDIOGRAM TRACING: CPT | Performed by: EMERGENCY MEDICINE

## 2024-12-10 PROCEDURE — 87077 CULTURE AEROBIC IDENTIFY: CPT

## 2024-12-10 PROCEDURE — 85379 FIBRIN DEGRADATION QUANT: CPT

## 2024-12-10 PROCEDURE — 2580000003 HC RX 258: Performed by: EMERGENCY MEDICINE

## 2024-12-10 PROCEDURE — 82150 ASSAY OF AMYLASE: CPT

## 2024-12-10 PROCEDURE — 96374 THER/PROPH/DIAG INJ IV PUSH: CPT

## 2024-12-10 PROCEDURE — 82248 BILIRUBIN DIRECT: CPT

## 2024-12-10 PROCEDURE — 99285 EMERGENCY DEPT VISIT HI MDM: CPT

## 2024-12-10 PROCEDURE — 81001 URINALYSIS AUTO W/SCOPE: CPT

## 2024-12-10 PROCEDURE — 82550 ASSAY OF CK (CPK): CPT

## 2024-12-10 PROCEDURE — 83880 ASSAY OF NATRIURETIC PEPTIDE: CPT

## 2024-12-10 PROCEDURE — 85610 PROTHROMBIN TIME: CPT

## 2024-12-10 PROCEDURE — 83605 ASSAY OF LACTIC ACID: CPT

## 2024-12-10 PROCEDURE — 83690 ASSAY OF LIPASE: CPT

## 2024-12-10 PROCEDURE — 84484 ASSAY OF TROPONIN QUANT: CPT

## 2024-12-10 PROCEDURE — 85027 COMPLETE CBC AUTOMATED: CPT

## 2024-12-10 PROCEDURE — 96375 TX/PRO/DX INJ NEW DRUG ADDON: CPT

## 2024-12-10 PROCEDURE — 80053 COMPREHEN METABOLIC PANEL: CPT

## 2024-12-10 RX ORDER — ASPIRIN 81 MG/1
81 TABLET ORAL DAILY
COMMUNITY

## 2024-12-10 RX ORDER — TRAMADOL HYDROCHLORIDE 50 MG/1
50 TABLET ORAL EVERY 6 HOURS PRN
Qty: 12 TABLET | Refills: 0 | Status: SHIPPED | OUTPATIENT
Start: 2024-12-10 | End: 2024-12-13

## 2024-12-10 RX ORDER — CEFDINIR 300 MG/1
300 CAPSULE ORAL 2 TIMES DAILY
Qty: 20 CAPSULE | Refills: 0 | Status: SHIPPED | OUTPATIENT
Start: 2024-12-10 | End: 2024-12-10 | Stop reason: SINTOL

## 2024-12-10 RX ORDER — ONDANSETRON 2 MG/ML
4 INJECTION INTRAMUSCULAR; INTRAVENOUS ONCE
Status: COMPLETED | OUTPATIENT
Start: 2024-12-10 | End: 2024-12-10

## 2024-12-10 RX ORDER — SERTRALINE HYDROCHLORIDE 25 MG/1
25 TABLET, FILM COATED ORAL DAILY
COMMUNITY

## 2024-12-10 RX ORDER — CARVEDILOL 6.25 MG/1
6.25 TABLET ORAL 2 TIMES DAILY WITH MEALS
COMMUNITY

## 2024-12-10 RX ORDER — TERAZOSIN 5 MG/1
5 CAPSULE ORAL NIGHTLY
COMMUNITY

## 2024-12-10 RX ORDER — HYDROXYCHLOROQUINE SULFATE 200 MG/1
200 TABLET, FILM COATED ORAL 2 TIMES DAILY
COMMUNITY

## 2024-12-10 RX ORDER — ONDANSETRON 4 MG/1
4 TABLET, FILM COATED ORAL EVERY 8 HOURS PRN
Qty: 12 TABLET | Refills: 0 | Status: SHIPPED | OUTPATIENT
Start: 2024-12-10

## 2024-12-10 RX ORDER — 0.9 % SODIUM CHLORIDE 0.9 %
1000 INTRAVENOUS SOLUTION INTRAVENOUS ONCE
Status: COMPLETED | OUTPATIENT
Start: 2024-12-10 | End: 2024-12-10

## 2024-12-10 RX ORDER — CEPHALEXIN 500 MG/1
500 CAPSULE ORAL 3 TIMES DAILY
Qty: 21 CAPSULE | Refills: 0 | Status: SHIPPED | OUTPATIENT
Start: 2024-12-10 | End: 2024-12-17

## 2024-12-10 RX ORDER — MULTIVITAMIN WITH IRON
1 TABLET ORAL DAILY
COMMUNITY

## 2024-12-10 RX ORDER — GABAPENTIN 100 MG/1
100 CAPSULE ORAL DAILY
COMMUNITY

## 2024-12-10 RX ORDER — FAMOTIDINE 20 MG/1
20 TABLET, FILM COATED ORAL 2 TIMES DAILY
Qty: 60 TABLET | Refills: 3 | Status: SHIPPED | OUTPATIENT
Start: 2024-12-10

## 2024-12-10 RX ORDER — SOLIFENACIN SUCCINATE 5 MG/1
5 TABLET, FILM COATED ORAL DAILY
COMMUNITY

## 2024-12-10 RX ORDER — MYCOPHENOLATE MOFETIL 250 MG/1
1000 CAPSULE ORAL 2 TIMES DAILY
COMMUNITY

## 2024-12-10 RX ADMIN — FAMOTIDINE 20 MG: 10 INJECTION, SOLUTION INTRAVENOUS at 21:46

## 2024-12-10 RX ADMIN — SODIUM CHLORIDE 1000 ML: 9 INJECTION, SOLUTION INTRAVENOUS at 21:47

## 2024-12-10 RX ADMIN — ONDANSETRON 4 MG: 2 INJECTION INTRAMUSCULAR; INTRAVENOUS at 21:46

## 2024-12-10 RX ADMIN — WATER 2000 MG: 1 INJECTION INTRAMUSCULAR; INTRAVENOUS; SUBCUTANEOUS at 23:58

## 2024-12-10 ASSESSMENT — LIFESTYLE VARIABLES
HOW OFTEN DO YOU HAVE A DRINK CONTAINING ALCOHOL: NEVER
HOW MANY STANDARD DRINKS CONTAINING ALCOHOL DO YOU HAVE ON A TYPICAL DAY: PATIENT DOES NOT DRINK

## 2024-12-11 LAB
EKG ATRIAL RATE: 70 BPM
EKG P AXIS: 69 DEGREES
EKG P-R INTERVAL: 160 MS
EKG Q-T INTERVAL: 438 MS
EKG QRS DURATION: 118 MS
EKG QTC CALCULATION (BAZETT): 473 MS
EKG R AXIS: 3 DEGREES
EKG T AXIS: 48 DEGREES
EKG VENTRICULAR RATE: 70 BPM

## 2024-12-11 PROCEDURE — 93010 ELECTROCARDIOGRAM REPORT: CPT | Performed by: INTERNAL MEDICINE

## 2024-12-11 PROCEDURE — 6370000000 HC RX 637 (ALT 250 FOR IP): Performed by: EMERGENCY MEDICINE

## 2024-12-11 RX ORDER — ONDANSETRON 4 MG/1
4 TABLET, ORALLY DISINTEGRATING ORAL ONCE
Status: COMPLETED | OUTPATIENT
Start: 2024-12-11 | End: 2024-12-11

## 2024-12-11 RX ADMIN — ONDANSETRON 4 MG: 4 TABLET, ORALLY DISINTEGRATING ORAL at 00:10

## 2024-12-11 NOTE — ED PROVIDER NOTES
This patient's ED course included: a personal history and physicial examination, re-evaluation prior to disposition, multiple bedside re-evaluations, IV medications, cardiac monitoring, continuous pulse oximetry, complex medical decision making and emergency management, and a personal history and physicial eaxmination    This patient has remained hemodynamically stable during their ED course.    Counseling:   The emergency provider has spoken with the patient and spouse/SO and discussed today’s results, in addition to providing specific details for the plan of care and counseling regarding the diagnosis and prognosis.  Questions are answered at this time and they are agreeable with the plan.       --------------------------------- IMPRESSION AND DISPOSITION ---------------------------------    IMPRESSION  1. Lower abdominal pain    2. Acute cystitis without hematuria        DISPOSITION  Disposition: Discharge to home  Patient condition is stable        NOTE: This report was transcribed using voice recognition software. Every effort was made to ensure accuracy; however, inadvertent computerized transcription errors may be present          Ilya Pressley MD  12/10/24 0780       Ilya Pressley MD  12/10/24 3413

## 2024-12-11 NOTE — DISCHARGE INSTRUCTIONS
Return if fevers vomiting flank pain increased abdominal pain blood in the stools or blood in your urine or high fevers or any chest pain or shortness of breath

## 2024-12-14 LAB
MICROORGANISM SPEC CULT: ABNORMAL
SPECIMEN DESCRIPTION: ABNORMAL

## (undated) DEVICE — FORCEPS BX L240CM JAW DIA2.4MM ORNG L CAP W/ NDL DISP RAD

## (undated) DEVICE — SNARE ENDOSCP L240CM LOOP W13MM SHTH DIA2.4MM SM OVL FLX

## (undated) DEVICE — SNARE ENDOSCP M L240CM W27MM SHTH DIA2.4MM CHN 2.8MM HEX

## (undated) DEVICE — NET RETRV STD FOREIGN BODY ROTH PED

## (undated) DEVICE — TRAP POLYP ETRAP

## (undated) DEVICE — SPONGE GZ W4XL4IN RAYON POLY FILL CVR W/ NONWOVEN FAB STERILE

## (undated) DEVICE — GRADUATE TRIANG MEASURE 1000ML BLK PRNT